# Patient Record
Sex: MALE | Race: WHITE | NOT HISPANIC OR LATINO | Employment: FULL TIME | URBAN - METROPOLITAN AREA
[De-identification: names, ages, dates, MRNs, and addresses within clinical notes are randomized per-mention and may not be internally consistent; named-entity substitution may affect disease eponyms.]

---

## 2017-01-10 ENCOUNTER — OFFICE VISIT (OUTPATIENT)
Dept: PSYCHOLOGY | Facility: CLINIC | Age: 47
End: 2017-01-10
Payer: MEDICAID

## 2017-01-10 DIAGNOSIS — F41.1 GENERALIZED ANXIETY DISORDER: Primary | ICD-10-CM

## 2017-01-10 PROCEDURE — 90834 PSYTX W PT 45 MINUTES: CPT | Performed by: MARRIAGE & FAMILY THERAPIST

## 2017-01-10 ASSESSMENT — ANXIETY QUESTIONNAIRES
6. BECOMING EASILY ANNOYED OR IRRITABLE: NOT AT ALL
5. BEING SO RESTLESS THAT IT IS HARD TO SIT STILL: NOT AT ALL
7. FEELING AFRAID AS IF SOMETHING AWFUL MIGHT HAPPEN: NOT AT ALL
GAD7 TOTAL SCORE: 3
1. FEELING NERVOUS, ANXIOUS, OR ON EDGE: SEVERAL DAYS
2. NOT BEING ABLE TO STOP OR CONTROL WORRYING: SEVERAL DAYS
IF YOU CHECKED OFF ANY PROBLEMS ON THIS QUESTIONNAIRE, HOW DIFFICULT HAVE THESE PROBLEMS MADE IT FOR YOU TO DO YOUR WORK, TAKE CARE OF THINGS AT HOME, OR GET ALONG WITH OTHER PEOPLE: SOMEWHAT DIFFICULT
3. WORRYING TOO MUCH ABOUT DIFFERENT THINGS: SEVERAL DAYS

## 2017-01-10 ASSESSMENT — PATIENT HEALTH QUESTIONNAIRE - PHQ9: 5. POOR APPETITE OR OVEREATING: NOT AT ALL

## 2017-01-10 NOTE — PROGRESS NOTES
Progress Note    Client Name: Antonio Ford  Date: 1/10/2017       Service Type: Individual      Session Start Time: 1pm Session End Time: 1:45pm   Session Length: 45 - 50     Session #: 68     Attendees: Client attended alone      PHQ-9 / FRANCISCO JAVIER-7 Due Date: completed today     DATA    Treatment Objective(s) Addressed in This Session:  Will identify and challenge negative self talk contributing to anxiety sx  ID current stressors contributing to anxiety      Progress on / Status of Treatment Objective(s) / Homework:  Satisfactory progress - ACTION (Actively working towards change); Intervened by reinforcing change plan / affirming steps taken continues to work on refuting negative thoughts,considering other employment.   Intervention:  Solution focused, CBT   Client reports he has been questioning whether he would like to remain at present job, as finances have not increased and he is unsure if he likes the work he presently does. Discussed other areas of work he might be qualified for or interested and encouraged him to start job seeking as well as looking into HR certifications he may need to pursue jobs in potential interest area. Client reports continued frustrations with girlfriend having monthly mood shifts, and disagreements around intimacy. Encouraged client to refute anxious thoughts when they occur and distract, allowing girlfriend some space when she is feeling irritable. Discussed girlfriend's growing up years with a father who was distant, and explosive at times, as well as hx of her dating relationships and how this effects their dating relationship- needing to build trust, working to resolve conflict.    90-day Treatment Plan review completed: yes    Current Stressors / Issues:  family, financial, dating/social, medical, occupational    Medication Review:  No changes to current psychiatric medication(s)   Current Outpatient Prescriptions   Medication      DULoxetine (CYMBALTA) 30 MG EC capsule     benzonatate (TESSALON) 200 MG capsule     amoxicillin-clavulanate (AUGMENTIN) 875-125 MG per tablet     albuterol (PROAIR HFA, PROVENTIL HFA, VENTOLIN HFA) 108 (90 BASE) MCG/ACT inhaler     fexofenadine (ALLEGRA) 180 MG tablet     guaiFENesin-codeine (ROBITUSSIN AC) 100-10 MG/5ML SOLN     sildenafil (VIAGRA) 100 MG tablet     gabapentin (NEURONTIN) 300 MG capsule     amitriptyline (ELAVIL) 50 MG tablet     No current facility-administered medications for this visit.         Medication Compliance:  Yes    Changes in Health Issues:   None reported    Chemical Use Review:   Substance Use: Chemical use reviewed, no active concerns identified      Tobacco Use: No current tobacco use.        ASSESSMENT: Current Emotional / Mental Status (status of significant symptoms):    Risk status (Self / Other harm or suicidal ideation)  Client denies current fears or concerns for personal safety.  Client denies current or recent suicidal ideation or behaviors.  Client denies current or recent homicidal ideation or behaviors.  Client denies current or recent self injurious behavior or ideation.  Client denies other safety concerns.  A safety and risk management plan has not been developed at this time, however client was given the after-hours number should there be a change in any of these risk factors.    Appearance:   Appropriate   Eye Contact:   Good   Psychomotor Behavior: Normal   Attitude:   Cooperative   Orientation:   All  Speech   Rate / Production: Normal    Volume:  Normal   Mood:    Anxious   Affect:    Appropriate   Thought Content:  Clear   Thought Form:  Coherent  Logical   Insight:    Fair     Collateral Reports Completed:  Not Applicable    PLAN: (Homework, other)  Encouraged client to continue to use thought refuting and confinement, and challenge negative thoughts.  Use cool down periods and allow girlfriend some space when in disagreements or irritable. Continue to  encourage client to utilize positive self affirmations and prayer when feeling anxious or doubting to improve the moment.  Client will continue to accept anxiety as a normal part of the process when considering changes and marriage.            Debbie Schreiber                                                     ________________________________________________________________________    Treatment Plan    Client's Name: Antonio Ford  YOB: 1970    Date: 8/20/2014    DSM-IV Diagnoses    AXIS I: 300.02 FRANCISCO JAVIER  Referral / Collaboration:  Collaboration was initiated with PCP MD.    Anticipated number of session or this episode of care: 45-50      MeasurableTreatment Goal(s) related to diagnosis / functional impairment(s)  Goal 1:Depression and anxiety: Client will decrease depressed mood and anxiety as evidenced by PHQ9 and GAD7 scores 4 and Under in the next 6 months. scoring 9/1/2015:  GAD7: 14. 1/10/2017: PHQ9:4, GAD7:3     I will know I've met my goal when I'm feeling less depressed and anxious, and happier in my relationship/my marriage is improving.    Objective #A (Client Action)  Status: Continued - Date(s):1/10/2017  Client will identify 1-2 initial signs or symptoms of anxiety and utilize anxiety reduction techniques to decrease anxiety. Client will ID triggers for depression and anxiety and work towards decreasing reactivity to or eliminating stressor if possible. client will develop more effective coping skills to manage depression and anxiety symptoms, will develop healthy cognitive patterns and beliefs, will increase ability to function adaptively and will continue to take medications as prescribed / participate in supportive activities. Client will improve communication and assertiveness skills and learn to set boundaries with others.    Intervention(s)   Therapist will teach client how to ID body cues for anxiety, anxiety reduction techniques, how to ID triggers for depression and anxiety-  decrease reactivity/eliminate, lifestyle changes to reduce depression and anxiety, communication skills, explore cognitive beliefs and help client to develop healthy cognitive patterns and beliefs. Grief counseling.       Client has reviewed and agreed to the above plan.      Debbie Schreiber

## 2017-01-11 ASSESSMENT — ANXIETY QUESTIONNAIRES: GAD7 TOTAL SCORE: 3

## 2017-01-11 ASSESSMENT — PATIENT HEALTH QUESTIONNAIRE - PHQ9: SUM OF ALL RESPONSES TO PHQ QUESTIONS 1-9: 4

## 2017-01-24 ENCOUNTER — OFFICE VISIT (OUTPATIENT)
Dept: PSYCHOLOGY | Facility: CLINIC | Age: 47
End: 2017-01-24
Payer: MEDICAID

## 2017-01-24 DIAGNOSIS — F41.1 GENERALIZED ANXIETY DISORDER: Primary | ICD-10-CM

## 2017-01-24 PROCEDURE — 90834 PSYTX W PT 45 MINUTES: CPT | Performed by: MARRIAGE & FAMILY THERAPIST

## 2017-01-24 NOTE — PROGRESS NOTES
Progress Note    Client Name: Antonio Ford  Date: 1/24/2017       Service Type: Individual      Session Start Time: 2pm Session End Time: 2:45pm   Session Length: 45 - 50     Session #: 69     Attendees: Client attended alone      PHQ-9 / FRANCISCO JAVIER-7 Due Date: completed last session   DATA    Treatment Objective(s) Addressed in This Session:  Will identify and challenge negative self talk contributing to anxiety sx  ID current stressors contributing to anxiety      Progress on / Status of Treatment Objective(s) / Homework:  Satisfactory progress - ACTION (Actively working towards change); Intervened by reinforcing change plan / affirming steps taken continues to work on reducing anxiety,considering other employment/spoke with boss.   Intervention:   CBT   Client reports this morning was rather emotional and tearful. He had a converstation with his friend whom he works for about his job, feeling as thought he is not making enough progress, complaints he has had about lack of training. Friend talked with him about training and that he has had enough he struggles to believe in himself and put the training into action. Encouraged client to notice black and white thinking and refute negative thoughts when they occur. Discussed praying against negative thoughts that have taken hold and encouraged use of positive self talk and self affirmation statements. Encouraged client by discuss progress client has made in counseling to date. Client reports relationship with girlfriend and relationships with children continue to go well.     90-day Treatment Plan review completed: yes    Current Stressors / Issues:  family, financial, dating/social, medical, occupational    Medication Review:  No changes to current psychiatric medication(s)   Current Outpatient Prescriptions   Medication     DULoxetine (CYMBALTA) 30 MG EC capsule     benzonatate (TESSALON) 200 MG capsule      amoxicillin-clavulanate (AUGMENTIN) 875-125 MG per tablet     albuterol (PROAIR HFA, PROVENTIL HFA, VENTOLIN HFA) 108 (90 BASE) MCG/ACT inhaler     fexofenadine (ALLEGRA) 180 MG tablet     guaiFENesin-codeine (ROBITUSSIN AC) 100-10 MG/5ML SOLN     sildenafil (VIAGRA) 100 MG tablet     gabapentin (NEURONTIN) 300 MG capsule     amitriptyline (ELAVIL) 50 MG tablet     No current facility-administered medications for this visit.         Medication Compliance:  Yes    Changes in Health Issues:   None reported    Chemical Use Review:   Substance Use: Chemical use reviewed, no active concerns identified      Tobacco Use: No current tobacco use.        ASSESSMENT: Current Emotional / Mental Status (status of significant symptoms):    Risk status (Self / Other harm or suicidal ideation)  Client denies current fears or concerns for personal safety.  Client denies current or recent suicidal ideation or behaviors.  Client denies current or recent homicidal ideation or behaviors.  Client denies current or recent self injurious behavior or ideation.  Client denies other safety concerns.  A safety and risk management plan has not been developed at this time, however client was given the after-hours number should there be a change in any of these risk factors.    Appearance:   Appropriate   Eye Contact:   Good   Psychomotor Behavior: Normal   Attitude:   Cooperative   Orientation:   All  Speech   Rate / Production: Normal    Volume:  Normal   Mood:    Anxious   Affect:    Appropriate   Thought Content:  Clear   Thought Form:  Coherent  Logical   Insight:    Fair     Collateral Reports Completed:  Not Applicable    PLAN: (Homework, other)  Encouraged client to continue to use thought refuting and confinement, and challenge negative thoughts.  Continue to encourage client to utilize positive self affirmations and prayer when feeling anxious or doubting to improve the moment.           Debbie Schreiber                                                      ________________________________________________________________________    Treatment Plan    Client's Name: Antonio Ford  YOB: 1970    Date: 8/20/2014    DSM-IV Diagnoses    AXIS I: 300.02 FRANCISCO JAVIER  Referral / Collaboration:  Collaboration was initiated with PCP MD.    Anticipated number of session or this episode of care: 45-50      MeasurableTreatment Goal(s) related to diagnosis / functional impairment(s)  Goal 1:Depression and anxiety: Client will decrease depressed mood and anxiety as evidenced by PHQ9 and GAD7 scores 4 and Under in the next 6 months. scoring 9/1/2015:  GAD7: 14. 1/10/2017: PHQ9:4, GAD7:3     I will know I've met my goal when I'm feeling less depressed and anxious, and happier in my relationship/my marriage is improving.    Objective #A (Client Action)  Status: Continued - Date(s):1/24/2017  Client will identify 1-2 initial signs or symptoms of anxiety and utilize anxiety reduction techniques to decrease anxiety. Client will ID triggers for depression and anxiety and work towards decreasing reactivity to or eliminating stressor if possible. client will develop more effective coping skills to manage depression and anxiety symptoms, will develop healthy cognitive patterns and beliefs, will increase ability to function adaptively and will continue to take medications as prescribed / participate in supportive activities. Client will improve communication and assertiveness skills and learn to set boundaries with others.    Intervention(s)   Therapist will teach client how to ID body cues for anxiety, anxiety reduction techniques, how to ID triggers for depression and anxiety- decrease reactivity/eliminate, lifestyle changes to reduce depression and anxiety, communication skills, explore cognitive beliefs and help client to develop healthy cognitive patterns and beliefs. Grief counseling.       Client has reviewed and agreed to the above plan.      Debbie WILCOX  Schreiber

## 2017-02-07 ENCOUNTER — OFFICE VISIT (OUTPATIENT)
Dept: PSYCHOLOGY | Facility: CLINIC | Age: 47
End: 2017-02-07
Payer: COMMERCIAL

## 2017-02-07 DIAGNOSIS — F41.1 GENERALIZED ANXIETY DISORDER: Primary | ICD-10-CM

## 2017-02-07 DIAGNOSIS — F40.10 SOCIAL ANXIETY DISORDER: ICD-10-CM

## 2017-02-07 PROCEDURE — 90834 PSYTX W PT 45 MINUTES: CPT | Performed by: MARRIAGE & FAMILY THERAPIST

## 2017-02-07 NOTE — PROGRESS NOTES
Progress Note    Client Name: Antonio Ford  Date: 2/7/2017         Service Type: Individual      Session Start Time: 3pm Session End Time: 3:45pm   Session Length: 45 - 50     Session #: 70     Attendees: Client attended alone      PHQ-9 / FRANCISCO JAVIER-7 Due Date: completed 1/4/17, declined  DATA    Treatment Objective(s) Addressed in This Session:  Will identify and challenge negative self talk contributing to anxiety sx  ID current stressors contributing to anxiety      Progress on / Status of Treatment Objective(s) / Homework:  Satisfactory progress - ACTION (Actively working towards change); Intervened by reinforcing change plan / affirming steps taken continues to work on reducing anxiety,considering other employment/spoke with boss.   Intervention:   CBT   Client reports last week he had a disagreement with girlfriend during an emotional week for her and she broke up with client. Client reports since this time feeling very panicky and anxious as he thought she was the one he would . Client reports they have not had contact since this time, however he would like to have contact with her, and is hoping she will change her mind. Discussed anxiety related to being alone and not partnered. It is his desire to remarry rather than be alone. Discussed that client needs work through anxiety about being alone, and learn to love and accept himself for who he is, and build up his self esteem. Encouraged client to focus his time and energy on building himself up, structuring day with hobbies and interests, continuing to read [employer] and remember the positive aspects of his present life. Client reports ex wife is very upset with him about the amount of child support he is paying and sent him an email about her thoughts. 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 Encouraged him to delay  contacting ex girlfriend and ex wife until after the interview has past, and instead focus time and energy om preparing for the interview, self care, and children. Discussed possible use of a worry time/thought confinement, thought refuting, and distraction if needed.       90-day Treatment Plan review completed: yes    Current Stressors / Issues:  family, financial, dating/social, medical, occupational    Medication Review:  No changes to current psychiatric medication(s)   Current Outpatient Prescriptions   Medication     DULoxetine (CYMBALTA) 30 MG EC capsule     benzonatate (TESSALON) 200 MG capsule     amoxicillin-clavulanate (AUGMENTIN) 875-125 MG per tablet     albuterol (PROAIR HFA, PROVENTIL HFA, VENTOLIN HFA) 108 (90 BASE) MCG/ACT inhaler     fexofenadine (ALLEGRA) 180 MG tablet     guaiFENesin-codeine (ROBITUSSIN AC) 100-10 MG/5ML SOLN     sildenafil (VIAGRA) 100 MG tablet     gabapentin (NEURONTIN) 300 MG capsule     amitriptyline (ELAVIL) 50 MG tablet     No current facility-administered medications for this visit.         Medication Compliance:  Yes    Changes in Health Issues:   None reported    Chemical Use Review:   Substance Use: Chemical use reviewed, no active concerns identified      Tobacco Use: No current tobacco use.        ASSESSMENT: Current Emotional / Mental Status (status of significant symptoms):    Risk status (Self / Other harm or suicidal ideation)  Client denies current fears or concerns for personal safety.  Client denies current or recent suicidal ideation or behaviors.  Client denies current or recent homicidal ideation or behaviors.  Client denies current or recent self injurious behavior or ideation.  Client denies other safety concerns.  A safety and risk management plan has not been developed at this time, however client was given the after-hours number should there be a change in any of these risk factors.    Appearance:   Appropriate   Eye Contact:   Good   Psychomotor  Behavior: Normal   Attitude:   Cooperative   Orientation:   All  Speech   Rate / Production: Normal    Volume:  Normal   Mood:    Anxious  Sad   Affect:    Appropriate   Thought Content:  Clear   Thought Form:  Coherent  Logical   Insight:    Fair     Collateral Reports Completed:  Not Applicable    PLAN: (Homework, other)  Encouraged client to continue to use thought refuting and confinement/worry time, distraction, and challenge negative thoughts.  Continue to encourage client to utilize positive self affirmations and prayer when feeling anxious or doubting to improve the moment. Client will focus on self care, and preparing for interview.           Debbie Schreiber                                                     ________________________________________________________________________    Treatment Plan    Client's Name: Antonio Ford  YOB: 1970    Date: 8/20/2014    DSM-IV Diagnoses    AXIS I: 300.02 FRANCISCO JAVIER, 300.23 Social Anxiety Disorder  Referral / Collaboration:  Collaboration was initiated with PCP MD.    Anticipated number of session or this episode of care: 75-80      MeasurableTreatment Goal(s) related to diagnosis / functional impairment(s)  Goal 1:Depression and anxiety: Client will decrease depressed mood and anxiety as evidenced by PHQ9 and GAD7 scores 4 and Under in the next 6 months. scoring 9/1/2015:  GAD7: 14. 1/10/2017: PHQ9:4, GAD7:3     I will know I've met my goal when I'm feeling less depressed and anxious, and happier in my relationship/my marriage is improving.    Objective #A (Client Action)  Status: Continued - Date(s):2/7/2017  Client will identify 1-2 initial signs or symptoms of anxiety and utilize anxiety reduction techniques to decrease anxiety. Client will ID triggers for depression and anxiety and work towards decreasing reactivity to or eliminating stressor if possible. client will develop more effective coping skills to manage depression and anxiety symptoms, will  develop healthy cognitive patterns and beliefs, will increase ability to function adaptively and will continue to take medications as prescribed / participate in supportive activities. Client will improve communication and assertiveness skills and learn to set boundaries with others.    Intervention(s)   Therapist will teach client how to ID body cues for anxiety, anxiety reduction techniques, how to ID triggers for depression and anxiety- decrease reactivity/eliminate, lifestyle changes to reduce depression and anxiety, communication skills, explore cognitive beliefs and help client to develop healthy cognitive patterns and beliefs. Grief counseling.       Client has reviewed and agreed to the above plan.      Debbie Schreiber

## 2017-02-14 ENCOUNTER — OFFICE VISIT (OUTPATIENT)
Dept: PSYCHOLOGY | Facility: CLINIC | Age: 47
End: 2017-02-14
Payer: COMMERCIAL

## 2017-02-14 DIAGNOSIS — F41.1 GENERALIZED ANXIETY DISORDER: Primary | ICD-10-CM

## 2017-02-14 DIAGNOSIS — F40.10 SOCIAL ANXIETY DISORDER: ICD-10-CM

## 2017-02-14 PROCEDURE — 90834 PSYTX W PT 45 MINUTES: CPT | Performed by: MARRIAGE & FAMILY THERAPIST

## 2017-02-14 NOTE — MR AVS SNAPSHOT
MRN:[id number]                      After Visit Summary   2/14/2017    Antonio Ford    MRN: [id number]           Visit Information        Provider Department      2/14/2017 3:00 PM Debbie Bro [facility]      Your next 10 appointments already scheduled     Feb 21, 2017  8:00 AM CST   Return Visit with Debbie Hubbardlure   [facility] ([facility]    [address]  [facility] [address]   [phone]            Mar 01, 2017  9:00 AM CST   Return Visit with Debbie Bro   [facility] ([facility])    [address]  [facility] [address]   [phone]              MyChart Information     [facility] gives you secure access to your electronic health record. If you see a primary care provider, you can also send messages to your care team and make appointments. If you have questions, please call your primary care clinic.  If you do not have a primary care provider, please call [phone] and they will assist you.        Care EveryWhere ID     This is your Care EveryWhere ID. This could be used by other organizations to access your [place] [facility] medical records  [id number]

## 2017-02-14 NOTE — PROGRESS NOTES
Progress Note    Client Name: Antonio Ford  Date: 2/14/2017       Service Type: Individual      Session Start Time: 3pm Session End Time: 3:45pm   Session Length: 45 - 50     Session #: 71     Attendees: Client attended alone      PHQ-9 / FRANCISCO JAVIER-7 Due Date:  declined  DATA    Treatment Objective(s) Addressed in This Session:  Will identify and challenge negative self talk contributing to anxiety sx  ID current stressors contributing to anxiety      Progress on / Status of Treatment Objective(s) / Homework:  Satisfactory progress - ACTION (Actively working towards change); Intervened by reinforcing change plan / affirming steps taken continues to work on reducing anxiety,considering other employment/spoke with boss.   Intervention:   CBT   Client reports remains increasingly anxious, as he and ex girlfriend remain broken up.Reports he was able to refrain from contacting ex girlfriend for about 2 days after last met, and then did initiated contact with her. Reports they have had brief texts which have gone ok. Reports Sunday morning he had a dream in which he felt compelled to email ex girlfriend an emailed he compiled shortly after she broke off their relationship. Client reports ex girlfriend acknowledged she received the email but has asked for several days to compile her thoughts before she responds, and states she is concerned he is anxiously awaiting a response. Client feels that ex girlfriend is the woman for him. Discussed provider concerns that ex girlfriend has some similar traits: anxiety, control, wanting client to make significant changes in his life, but not working on her own issues that are share features with ex wife. Discussed whether relationship moving forward would be healthy for client and what changes might need to be made by ex girlfriend in the future for relationship to be more healthy and functional. Continue to encourage use of a worry time/thought  confinement, thought refuting, building up hobbies/interests, increased socialization, use of exercise and distractions.  Client had a job interview last week with [employer] which he states was very positive, and has a second interview this week. He is very excited about this potential position, and if offered will greatly improve his financial picture and reduce tension with ex wife.         90-day Treatment Plan review completed: yes    Current Stressors / Issues:  family, financial, dating/social, medical, occupational    Medication Review:  No changes to current psychiatric medication(s)   Current Outpatient Prescriptions   Medication     DULoxetine (CYMBALTA) 30 MG EC capsule     benzonatate (TESSALON) 200 MG capsule     amoxicillin-clavulanate (AUGMENTIN) 875-125 MG per tablet     albuterol (PROAIR HFA, PROVENTIL HFA, VENTOLIN HFA) 108 (90 BASE) MCG/ACT inhaler     fexofenadine (ALLEGRA) 180 MG tablet     guaiFENesin-codeine (ROBITUSSIN AC) 100-10 MG/5ML SOLN     sildenafil (VIAGRA) 100 MG tablet     gabapentin (NEURONTIN) 300 MG capsule     amitriptyline (ELAVIL) 50 MG tablet     No current facility-administered medications for this visit.          Medication Compliance:  Yes    Changes in Health Issues:   None reported    Chemical Use Review:   Substance Use: Chemical use reviewed, no active concerns identified      Tobacco Use: No current tobacco use.        ASSESSMENT: Current Emotional / Mental Status (status of significant symptoms):    Risk status (Self / Other harm or suicidal ideation)  Client denies current fears or concerns for personal safety.  Client denies current or recent suicidal ideation or behaviors.  Client denies current or recent homicidal ideation or behaviors.  Client denies current or recent self injurious behavior or ideation.  Client denies other safety concerns.  A safety and risk management plan has not been developed at this time, however client was given the after-hours number  should there be a change in any of these risk factors.    Appearance:   Appropriate   Eye Contact:   Good   Psychomotor Behavior: Normal   Attitude:   Cooperative   Orientation:   All  Speech   Rate / Production: Normal    Volume:  Normal   Mood:    Anxious  Sad   Affect:    Appropriate   Thought Content:  Clear   Thought Form:  Coherent  Logical   Insight:    Fair     Collateral Reports Completed:  Not Applicable    PLAN: (Homework, other)  Continue to encourage use of a worry time/thought confinement, thought refuting, building up hobbies/interests, increased socialization, use of exercise and distractions.  Continue to encourage client to utilize positive self affirmations and prayer when feeling anxious or doubting to improve the moment. Client will focus on self care, and preparing for interview. Client will consider whether pursuing relationship with ex girlfriend is healthy for him.           Debbie Schreiber                                                     ________________________________________________________________________    Treatment Plan    Client's Name: Antonio Ford  YOB: 1970    Date: 8/20/2014    DSM-IV Diagnoses    AXIS I: 300.02 FRANCISCO JAVIER, 300.23 Social Anxiety Disorder  Referral / Collaboration:  Collaboration was initiated with PCP MD.    Anticipated number of session or this episode of care: 75-80      MeasurableTreatment Goal(s) related to diagnosis / functional impairment(s)  Goal 1:Depression and anxiety: Client will decrease depressed mood and anxiety as evidenced by PHQ9 and GAD7 scores 4 and Under in the next 6 months. scoring 9/1/2015:  GAD7: 14. 1/10/2017: PHQ9:4, GAD7:3     I will know I've met my goal when I'm feeling less depressed and anxious, and happier in my relationship/my marriage is improving.    Objective #A (Client Action)  Status: Continued - Date(s):2/14/2017  Client will identify 1-2 initial signs or symptoms of anxiety and utilize anxiety reduction  techniques to decrease anxiety. Client will ID triggers for depression and anxiety and work towards decreasing reactivity to or eliminating stressor if possible. client will develop more effective coping skills to manage depression and anxiety symptoms, will develop healthy cognitive patterns and beliefs, will increase ability to function adaptively and will continue to take medications as prescribed / participate in supportive activities. Client will improve communication and assertiveness skills and learn to set boundaries with others.    Intervention(s)   Therapist will teach client how to ID body cues for anxiety, anxiety reduction techniques, how to ID triggers for depression and anxiety- decrease reactivity/eliminate, lifestyle changes to reduce depression and anxiety, communication skills, explore cognitive beliefs and help client to develop healthy cognitive patterns and beliefs. Grief counseling.       Client has reviewed and agreed to the above plan.      Debbie Schreiber

## 2017-02-21 ENCOUNTER — OFFICE VISIT (OUTPATIENT)
Dept: PSYCHOLOGY | Facility: CLINIC | Age: 47
End: 2017-02-21
Payer: COMMERCIAL

## 2017-02-21 DIAGNOSIS — F40.10 SOCIAL ANXIETY DISORDER: ICD-10-CM

## 2017-02-21 DIAGNOSIS — F41.1 GENERALIZED ANXIETY DISORDER: Primary | ICD-10-CM

## 2017-02-21 PROCEDURE — 90834 PSYTX W PT 45 MINUTES: CPT | Performed by: MARRIAGE & FAMILY THERAPIST

## 2017-02-21 NOTE — PROGRESS NOTES
Progress Note    Client Name: Antonio Ford  Date: 2/21/2017       Service Type: Individual      Session Start Time: 8am Session End Time: 8:45am   Session Length: 45 - 50     Session #: 72     Attendees: Client attended alone      PHQ-9 / FRANCISCO JAVIER-7 Due Date:  declined  DATA    Treatment Objective(s) Addressed in This Session:  Will identify and challenge negative self talk contributing to anxiety sx  ID current stressors contributing to anxiety      Progress on / Status of Treatment Objective(s) / Homework:  Satisfactory progress - ACTION (Actively working towards change); Intervened by reinforcing change plan / affirming steps taken continues to work on reducing anxiety,considering other employment/spoke with boss.   Intervention:   CBT   Client reports he and girlfriend were able to reconcile their relationship and are back together. Reports they have been talking about the importance of putting intimacy on hold until marriage and discussed some changes client may make to improve ability to do so. Encouraged client and girlfriend to consider reading the resolution for men and women books together, and have also discussed working to talk about conflict when it happens rather than girlfriend stuffing it and getting upset. Discussed some ongoing issues with ex wife wanting him to pay child support and threatening to take him to court despite current calculation stating she owes him money. Encouraged him to stand his ground about not giving money at the detriment of his own household. Client reports good friend has offered to pay client's legal fees should they end up in court. Reports son is doing poorly in school, so they have grounded him to the house to complete homework, and can only go to school and work. Client reports son is working hard to get caught up, but likely will not graduate this year. Continue to encourage client to use natural and logical consequences for  son, and allow him to take ownership of his problems. Reports job interview went well, and should hear back from [employer] next week. Encouraged client to brainstorm some questions that might be asked at next interview and practice answering the questions to reduce overall anxiety.     90-day Treatment Plan review completed: yes    Current Stressors / Issues:  family, financial, dating/social, medical, occupational    Medication Review:  No changes to current psychiatric medication(s)   Current Outpatient Prescriptions   Medication     DULoxetine (CYMBALTA) 30 MG EC capsule     benzonatate (TESSALON) 200 MG capsule     amoxicillin-clavulanate (AUGMENTIN) 875-125 MG per tablet     albuterol (PROAIR HFA, PROVENTIL HFA, VENTOLIN HFA) 108 (90 BASE) MCG/ACT inhaler     fexofenadine (ALLEGRA) 180 MG tablet     guaiFENesin-codeine (ROBITUSSIN AC) 100-10 MG/5ML SOLN     sildenafil (VIAGRA) 100 MG tablet     gabapentin (NEURONTIN) 300 MG capsule     amitriptyline (ELAVIL) 50 MG tablet     No current facility-administered medications for this visit.          Medication Compliance:  Yes    Changes in Health Issues:   None reported    Chemical Use Review:   Substance Use: Chemical use reviewed, no active concerns identified      Tobacco Use: No current tobacco use.        ASSESSMENT: Current Emotional / Mental Status (status of significant symptoms):    Risk status (Self / Other harm or suicidal ideation)  Client denies current fears or concerns for personal safety.  Client denies current or recent suicidal ideation or behaviors.  Client denies current or recent homicidal ideation or behaviors.  Client denies current or recent self injurious behavior or ideation.  Client denies other safety concerns.  A safety and risk management plan has not been developed at this time, however client was given the after-hours number should there be a change in any of these risk factors.    Appearance:   Appropriate   Eye Contact:   Good    Psychomotor Behavior: Normal   Attitude:   Cooperative   Orientation:   All  Speech   Rate / Production: Normal    Volume:  Normal   Mood:    Anxious   Affect:    Appropriate   Thought Content:  Clear   Thought Form:  Coherent  Logical   Insight:    Fair     Collateral Reports Completed:  Not Applicable    PLAN: (Homework, other)  Continue to encourage use of a worry time/thought confinement, thought refuting, building up hobbies/interests, increased socialization, use of exercise and distractions.  Continue to encourage client to utilize positive self affirmations and prayer when feeling anxious or doubting to improve the moment. Client will focus on self care, and preparing for next interview.         Debbie Schreiber                                                     ________________________________________________________________________    Treatment Plan    Client's Name: Antonio Ford  YOB: 1970    Date: 8/20/2014    DSM-IV Diagnoses    AXIS I: 300.02 FRANCISCO JAVIER, 300.23 Social Anxiety Disorder  Referral / Collaboration:  Collaboration was initiated with PCP MD.    Anticipated number of session or this episode of care: 75-80      MeasurableTreatment Goal(s) related to diagnosis / functional impairment(s)  Goal 1:Depression and anxiety: Client will decrease depressed mood and anxiety as evidenced by PHQ9 and GAD7 scores 4 and Under in the next 6 months. scoring 9/1/2015:  GAD7: 14. 1/10/2017: PHQ9:4, GAD7:3     I will know I've met my goal when I'm feeling less depressed and anxious, and happier in my relationship/my marriage is improving.    Objective #A (Client Action)  Status: Continued - Date(s):2/21/2017  Client will identify 1-2 initial signs or symptoms of anxiety and utilize anxiety reduction techniques to decrease anxiety. Client will ID triggers for depression and anxiety and work towards decreasing reactivity to or eliminating stressor if possible. client will develop more effective  coping skills to manage depression and anxiety symptoms, will develop healthy cognitive patterns and beliefs, will increase ability to function adaptively and will continue to take medications as prescribed / participate in supportive activities. Client will improve communication and assertiveness skills and learn to set boundaries with others.    Intervention(s)   Therapist will teach client how to ID body cues for anxiety, anxiety reduction techniques, how to ID triggers for depression and anxiety- decrease reactivity/eliminate, lifestyle changes to reduce depression and anxiety, communication skills, explore cognitive beliefs and help client to develop healthy cognitive patterns and beliefs. Grief counseling.       Client has reviewed and agreed to the above plan.      Debbie Schreiber

## 2017-02-21 NOTE — MR AVS SNAPSHOT
MRN:[id number]                      After Visit Summary   2/21/2017    Antonio Ford    MRN: [id number]           Visit Information        Provider Department      2/21/2017 8:00 AM Debbie Bro [facility]      Your next 10 appointments already scheduled     Mar 01, 2017  9:00 AM CST   Return Visit with Debbie Hubbardlure   [facility] ([facility])    [address]  [facility] [address] [phone]            Mar 14, 2017  3:00 PM CDT   Return Visit with Debbie Bro   [facility] ([facility])    [address]  [facility] [address]   [phone]              MyChart Information     [facility] gives you secure access to your electronic health record. If you see a primary care provider, you can also send messages to your care team and make appointments. If you have questions, please call your primary care clinic.  If you do not have a primary care provider, please call [phone] and they will assist you.        Care EveryWhere ID     This is your Care EveryWhere ID. This could be used by other organizations to access your [place] medical records  [id number]

## 2017-03-01 ENCOUNTER — OFFICE VISIT (OUTPATIENT)
Dept: PSYCHOLOGY | Facility: CLINIC | Age: 47
End: 2017-03-01
Payer: COMMERCIAL

## 2017-03-01 DIAGNOSIS — F41.1 GENERALIZED ANXIETY DISORDER: Primary | ICD-10-CM

## 2017-03-01 DIAGNOSIS — F40.10 SOCIAL ANXIETY DISORDER: ICD-10-CM

## 2017-03-01 PROCEDURE — 90834 PSYTX W PT 45 MINUTES: CPT | Performed by: MARRIAGE & FAMILY THERAPIST

## 2017-03-01 NOTE — PROGRESS NOTES
Progress Note    Client Name: Antonio Ford  Date: 3/1/2017       Service Type: Individual      Session Start Time: 9am Session End Time: 9:45am   Session Length: 45 - 50     Session #: 73     Attendees: Client attended alone      PHQ-9 / FRANCISCO JAVIER-7 Due Date:  declined  DATA    Treatment Objective(s) Addressed in This Session:  ID current stressors contributing to anxiety  Client will share thoughts, feelings, and wants with girlfriend      Progress on / Status of Treatment Objective(s) / Homework:  Satisfactory progress - ACTION (Actively working towards change); Intervened by reinforcing change plan / affirming steps taken continues to work on reducing anxiety, working on improving relationship with girlfriend    Intervention:   CBT, solution focused   Client reports this last week has been stressful as girlfriend fell while playing with her son and fractured her ankle. As it was her right foot she is unable to drive, and has required a lot of care and help around her home. Reports that when he spends time with girlfriend he likes to looks at her. Girlfriend typically does not like this. Gave client an awareness wheel handout and encouraged client to talk with girlfriend about each of their thoughts, feelings, and wants in this situation. Discussed with client that girlfriend is most likely feeling insecure with herself in these moments. Encouraged client to talk to girlfriend about ways he could make her feel more comfortable and supported in their relationship. Reports relationships with children continue to go well, and has not talked with ex about child support. Client is waiting to hear back from [employer] about the job opportunity.     90-day Treatment Plan review completed: yes    Current Stressors / Issues:  family, financial, dating/social, medical, occupational    Medication Review:  No changes to current psychiatric medication(s)   Current Outpatient Prescriptions    Medication     DULoxetine (CYMBALTA) 30 MG EC capsule     benzonatate (TESSALON) 200 MG capsule     amoxicillin-clavulanate (AUGMENTIN) 875-125 MG per tablet     albuterol (PROAIR HFA, PROVENTIL HFA, VENTOLIN HFA) 108 (90 BASE) MCG/ACT inhaler     fexofenadine (ALLEGRA) 180 MG tablet     guaiFENesin-codeine (ROBITUSSIN AC) 100-10 MG/5ML SOLN     sildenafil (VIAGRA) 100 MG tablet     gabapentin (NEURONTIN) 300 MG capsule     amitriptyline (ELAVIL) 50 MG tablet     No current facility-administered medications for this visit.          Medication Compliance:  Yes    Changes in Health Issues:   None reported    Chemical Use Review:   Substance Use: Chemical use reviewed, no active concerns identified      Tobacco Use: No current tobacco use.        ASSESSMENT: Current Emotional / Mental Status (status of significant symptoms):    Risk status (Self / Other harm or suicidal ideation)  Client denies current fears or concerns for personal safety.  Client denies current or recent suicidal ideation or behaviors.  Client denies current or recent homicidal ideation or behaviors.  Client denies current or recent self injurious behavior or ideation.  Client denies other safety concerns.  A safety and risk management plan has not been developed at this time, however client was given the after-hours number should there be a change in any of these risk factors.    Appearance:   Appropriate   Eye Contact:   Good   Psychomotor Behavior: Normal   Attitude:   Cooperative   Orientation:   All  Speech   Rate / Production: Normal    Volume:  Normal   Mood:    Anxious   Affect:    Appropriate   Thought Content:  Clear   Thought Form:  Coherent  Logical   Insight:    Fair     Collateral Reports Completed:  Not Applicable    PLAN: (Homework, other)  Continue to encourage use of a worry time/thought confinement, thought refuting, building up hobbies/interests, increased socialization, use of exercise and distractions.  Continue to encourage  client to utilize positive self affirmations and prayer when feeling anxious or doubting to improve the moment. Client will talk with girlfriend about thoughts, feelings, and wants      Debbie Schreiber                                                     ________________________________________________________________________    Treatment Plan    Client's Name: Antonio Ford  YOB: 1970    Date: 8/20/2014    DSM-IV Diagnoses    AXIS I: 300.02 FRANCISCO JAVIER, 300.23 Social Anxiety Disorder  Referral / Collaboration:  Collaboration was initiated with PCP MD.    Anticipated number of session or this episode of care: 75-80      MeasurableTreatment Goal(s) related to diagnosis / functional impairment(s)  Goal 1:Depression and anxiety: Client will decrease depressed mood and anxiety as evidenced by PHQ9 and GAD7 scores 4 and Under in the next 6 months. scoring 9/1/2015:  GAD7: 14. 1/10/2017: PHQ9:4, GAD7:3     I will know I've met my goal when I'm feeling less depressed and anxious, and happier in my relationship/my marriage is improving.    Objective #A (Client Action)  Status: Continued - Date(s):3/1/2017  Client will identify 1-2 initial signs or symptoms of anxiety and utilize anxiety reduction techniques to decrease anxiety. Client will ID triggers for depression and anxiety and work towards decreasing reactivity to or eliminating stressor if possible. client will develop more effective coping skills to manage depression and anxiety symptoms, will develop healthy cognitive patterns and beliefs, will increase ability to function adaptively and will continue to take medications as prescribed / participate in supportive activities. Client will improve communication and assertiveness skills and learn to set boundaries with others.    Intervention(s)   Therapist will teach client how to ID body cues for anxiety, anxiety reduction techniques, how to ID triggers for depression and anxiety- decrease  reactivity/eliminate, lifestyle changes to reduce depression and anxiety, communication skills, explore cognitive beliefs and help client to develop healthy cognitive patterns and beliefs. Grief counseling.       Client has reviewed and agreed to the above plan.      Debbie Schreiber

## 2017-03-01 NOTE — MR AVS SNAPSHOT
MRN:[id number]                      After Visit Summary   3/1/2017    Antonio Ford    MRN: [id number]           Visit Information        Provider Department      3/1/2017 9:00 AM Debbie Bro [facility] Generic      Your next 10 appointments already scheduled     Mar 14, 2017  3:00 PM CDT   Return Visit with Debbie Bro   [facility] ([facility])    [address]  [facility] [address] [phone]            Mar 28, 2017  8:00 AM CDT   Return Visit with Debbie Bro   [facility] ([facility])    [address]  [facility] [address]   [phone]              MyChart Information     [facility] gives you secure access to your electronic health record. If you see a primary care provider, you can also send messages to your care team and make appointments. If you have questions, please call your primary care clinic.  If you do not have a primary care provider, please call [phone] and they will assist you.        Care EveryWhere ID     This is your Care EveryWhere ID. This could be used by other organizations to access your [place] medical records  [id number]

## 2017-03-07 ENCOUNTER — OFFICE VISIT (OUTPATIENT)
Dept: URGENT CARE | Facility: RETAIL CLINIC | Age: 47
End: 2017-03-07
Payer: COMMERCIAL

## 2017-03-07 VITALS — TEMPERATURE: 99.7 F | SYSTOLIC BLOOD PRESSURE: 138 MMHG | HEART RATE: 96 BPM | DIASTOLIC BLOOD PRESSURE: 85 MMHG

## 2017-03-07 DIAGNOSIS — J30.2 SEASONAL ALLERGIC RHINITIS, UNSPECIFIED ALLERGIC RHINITIS TRIGGER: ICD-10-CM

## 2017-03-07 DIAGNOSIS — J01.90 ACUTE SINUSITIS WITH COEXISTING CONDITION, NEED PROPHYLACTIC TREATMENT: Primary | ICD-10-CM

## 2017-03-07 PROCEDURE — 99213 OFFICE O/P EST LOW 20 MIN: CPT | Performed by: PHYSICIAN ASSISTANT

## 2017-03-07 NOTE — MR AVS SNAPSHOT
After Visit Summary   3/7/2017    Antonio Ford    MRN: [id number]           Patient Information     Date Of Birth          1970        Visit Information        Provider Department      3/7/2017 1:20 PM Sharlene Madden PA-C [facility]        Today's Diagnoses     Acute sinusitis with coexisting condition, need prophylactic treatment    -  1    Seasonal allergic rhinitis, unspecified allergic rhinitis trigger          Care Instructions    Most sinus infections are viral and will go away on their own with out an antibiotic.  Fill you prescription if symptoms worsen after 7 days or do not improve after 10 days.  Prescription will be available to fill if needed on March 9.  Augmentin (amoxicillin-clavulanate) 875mg twice daily for 10 days as directed.  Take Advil cold and sinus behind the pharmacist counter.  Take Tylenol as needed for pain.  Afrin (oxymetazoline) nasal spray twice daily for 3 days. Stop after 3 days.  Flonase 2 sprays in each nostril daily until symptoms resolve, then continue 1 spray in each nostril for at least 5 more days.  May use netti pot with bottled or distilled water and saline packets to flush sinuses.  Mucinex (guiafenesin) thins mucus and may help it to loosen more quickly  Saline drops or nasal sprays may loosen mucus.  Sit in the bathroom with the door closed and hot shower running to loosen mucus.  Contact primary care clinic if you do not have any relief from your symptoms after 10 days.  Present to emergency room for significantly increasing pain, persistent high fever >102F, swelling/redness around your eyes, changes in your vision or ability to move your eyes, altered mental status or a severe headache.        Follow-ups after your visit        Your next 10 appointments already scheduled     Mar 14, 2017  3:00 PM CDT   Return Visit with Debbie Bro   [facility] ([facility])    [address]  [place] [facility] [id number] [phone]            Mar 28, 2017  8:00 AM CDT   Return Visit with Debbie Bro   [facility] ([facility])    [address]  [place] [id number] [phone]              Who to contact     You can reach your care team any time of the day by calling [phone].  Notification of test results:  If you have an abnormal lab result, we will notify you by phone as soon as possible.         Additional Information About Your Visit        MyChart Information     [employer] gives you secure access to your electronic health record. If you see a primary care provider, you can also send messages to your care team and make appointments. If you have questions, please call your primary care clinic.  If you do not have a primary care provider, please call [phone] and they will assist you.        Care EveryWhere ID     This is your Care EveryWhere ID. This could be used by other organizations to access your [facility] medical records  [id number]        Your Vitals Were     Pulse Temperature                96 99.7  F (37.6  C) (Temporal)           Blood Pressure from Last 3 Encounters:   03/07/17 138/85   05/11/16 122/76   04/20/16 124/86    Weight from Last 3 Encounters:   05/11/16 217 lb (98.4 kg)   07/07/15 205 lb (93 kg)   12/15/14 198 lb (89.8 kg)              Today, you had the following     No orders found for display         Where to get your medicines      These medications were sent to [facility] [address] [address] [address]  [address] [facility] [address]     Phone:  [phone]     amoxicillin-clavulanate 875-125 MG per tablet          Primary Care Provider Office Phone # Fax #    Josh Morales PA-C [phone]       [facility] [address]  [facility] [address]        Thank you!     Thank you for choosing [facility]  for your care. Our  goal is always to provide you with excellent care. Hearing back from our patients is one way we can continue to improve our services. Please take a few minutes to complete the written survey that you may receive in the mail after your visit with us. Thank you!             Your Updated Medication List - Protect others around you: Learn how to safely use, store and throw away your medicines at www.disposemymeds.org.          This list is accurate as of: 3/7/17  2:08 PM.  Always use your most recent med list.                   Brand Name Dispense Instructions for use    albuterol 108 (90 BASE) MCG/ACT Inhaler    PROAIR HFA/PROVENTIL HFA/VENTOLIN HFA    1 Inhaler    Inhale 2 puffs into the lungs every 6 hours as needed for shortness of breath / dyspnea or wheezing       amitriptyline 50 MG tablet    ELAVIL    30 tablet    Take 1 tablet (50 mg) by mouth At Bedtime       amoxicillin-clavulanate 875-125 MG per tablet   Start taking on:  3/10/2017    AUGMENTIN    20 tablet    Take 1 tablet by mouth 2 times daily for 10 days       DULoxetine 30 MG EC capsule    CYMBALTA    60 capsule    TAKE 1 CAPSULE (30 MG) BY MOUTH 2 TIMES DAILY       fexofenadine 180 MG tablet    ALLEGRA    90 tablet    Take 1 tablet (180 mg) by mouth daily       gabapentin 300 MG capsule    NEURONTIN    270 capsule    Take 3 capsules (900 mg) by mouth 3 times daily       sildenafil 100 MG cap/tab    REVATIO/VIAGRA    12 tablet    Take 0.5-1 tablets ( mg) by mouth daily as needed for erectile dysfunction Take 30 min to 4 hours before intercourse.  Never use with nitroglycerin, terazosin or doxazosin.

## 2017-03-07 NOTE — PROGRESS NOTES
Chief Complaint   Patient presents with     Sinus Problem     x 4 days, sinus congestion and pain, no fevers but pt has the chills     SUBJECTIVE:  Antonio Ford is a 46 year old male here with concerns about sinus infection.  He states onset of symptoms was 5 days ago.    Course of illness is worsening.   Severity moderate  He has had maxillary pressure as well as sneezing, nasal congestion, rhinorrhea, sore throat, facial pain/pressure, tooth pain and headache  Predisposing factors include seasonal allergies.   Recent treatment has included: Antihistamine, Decongestants and OTC meds    Past Medical History   Diagnosis Date     Allergic rhinitis, cause unspecified      Rhinitis, Allergic     Depressive disorder      Unspecified sinusitis (chronic)      Chronic sinusitis     Current Outpatient Prescriptions   Medication Sig Dispense Refill     [START ON 3/10/2017] amoxicillin-clavulanate (AUGMENTIN) 875-125 MG per tablet Take 1 tablet by mouth 2 times daily for 10 days 20 tablet 0     DULoxetine (CYMBALTA) 30 MG EC capsule TAKE 1 CAPSULE (30 MG) BY MOUTH 2 TIMES DAILY 60 capsule 4     fexofenadine (ALLEGRA) 180 MG tablet Take 1 tablet (180 mg) by mouth daily 90 tablet 1     gabapentin (NEURONTIN) 300 MG capsule Take 3 capsules (900 mg) by mouth 3 times daily 270 capsule 11     amitriptyline (ELAVIL) 50 MG tablet Take 1 tablet (50 mg) by mouth At Bedtime 30 tablet 11     albuterol (PROAIR HFA, PROVENTIL HFA, VENTOLIN HFA) 108 (90 BASE) MCG/ACT inhaler Inhale 2 puffs into the lungs every 6 hours as needed for shortness of breath / dyspnea or wheezing (Patient not taking: Reported on 3/7/2017) 1 Inhaler 1     sildenafil (VIAGRA) 100 MG tablet Take 0.5-1 tablets ( mg) by mouth daily as needed for erectile dysfunction Take 30 min to 4 hours before intercourse.  Never use with nitroglycerin, terazosin or doxazosin. (Patient not taking: Reported on 3/7/2017) 12 tablet 11     Social History   Substance Use Topics      Smoking status: Never Smoker     Smokeless tobacco: Never Used     Alcohol use No     Allergies   Allergen Reactions     Birch Trees      Cats      Dogs      Nuts      Seasonal Allergies      ROS:  Review of systems negative except as stated above.    OBJECTIVE:  /85 (BP Location: Left arm)  Pulse 96  Temp 99.7  F (37.6  C) (Temporal)  GENERAL APPEARANCE: healthy, alert and no distress  EYES: PERRL, conjunctiva clear  HENT: Pain with palpation over frontal and maxillary sinuses. Ear canals normal TMs with mild serous effusions bilaterally. Nasal turbinates edematous and boggy with a blue hue bilaterally. Posterior pharynx is not erythematous.  NECK: supple, nontender, no lymphadenopathy  RESP: lungs clear to auscultation - no rales, rhonchi or wheezes  CV: regular rates and rhythm, normal S1 S2, no murmur noted    ASSESSMENT:    ICD-10-CM    1. Acute sinusitis with coexisting condition, need prophylactic treatment J01.90 amoxicillin-clavulanate (AUGMENTIN) 875-125 MG per tablet   2. Seasonal allergic rhinitis, unspecified allergic rhinitis trigger J30.2      PLAN:   Patient Instructions   Most sinus infections are viral and will go away on their own with out an antibiotic.  Fill you prescription if symptoms worsen after 7 days or do not improve after 10 days.  Prescription will be available to fill if needed on March 9.  Augmentin (amoxicillin-clavulanate) 875mg twice daily for 10 days as directed.  Take Advil cold and sinus behind the pharmacist counter.  Take Tylenol as needed for pain.  Afrin (oxymetazoline) nasal spray twice daily for 3 days. Stop after 3 days.  Flonase 2 sprays in each nostril daily until symptoms resolve, then continue 1 spray in each nostril for at least 5 more days.  May use netti pot with bottled or distilled water and saline packets to flush sinuses.  Mucinex (guiafenesin) thins mucus and may help it to loosen more quickly  Saline drops or nasal sprays may loosen mucus.  Sit in the  bathroom with the door closed and hot shower running to loosen mucus.  Contact primary care clinic if you do not have any relief from your symptoms after 10 days.  Present to emergency room for significantly increasing pain, persistent high fever >102F, swelling/redness around your eyes, changes in your vision or ability to move your eyes, altered mental status or a severe headache.    Follow up with primary care provider with any problems, questions or concerns or if symptoms worsen or fail to improve. Patient agreed to plan and verbalized understanding.    Lachelle Madden PA-C  [facility]

## 2017-03-07 NOTE — NURSING NOTE
"Chief Complaint   Patient presents with     Sinus Problem     x 4 days, sinus congestion and pain, no fevers but pt has the chills       Initial /85 (BP Location: Left arm)  Pulse 96  Temp 99.7  F (37.6  C) (Temporal) Estimated body mass index is 30.27 kg/(m^2) as calculated from the following:    Height as of 5/11/16: 5' 11\" (1.803 m).    Weight as of 5/11/16: 217 lb (98.4 kg).  Medication Reconciliation: complete    "

## 2017-03-07 NOTE — PATIENT INSTRUCTIONS
Most sinus infections are viral and will go away on their own with out an antibiotic.  Fill you prescription if symptoms worsen after 7 days or do not improve after 10 days.  Prescription will be available to fill if needed on March 9.  Augmentin (amoxicillin-clavulanate) 875mg twice daily for 10 days as directed.  Take Advil cold and sinus behind the pharmacist counter.  Take Tylenol as needed for pain.  Afrin (oxymetazoline) nasal spray twice daily for 3 days. Stop after 3 days.  Flonase 2 sprays in each nostril daily until symptoms resolve, then continue 1 spray in each nostril for at least 5 more days.  May use netti pot with bottled or distilled water and saline packets to flush sinuses.  Mucinex (guiafenesin) thins mucus and may help it to loosen more quickly  Saline drops or nasal sprays may loosen mucus.  Sit in the bathroom with the door closed and hot shower running to loosen mucus.  Contact primary care clinic if you do not have any relief from your symptoms after 10 days.  Present to emergency room for significantly increasing pain, persistent high fever >102F, swelling/redness around your eyes, changes in your vision or ability to move your eyes, altered mental status or a severe headache.

## 2017-03-16 ENCOUNTER — OFFICE VISIT (OUTPATIENT)
Dept: PSYCHOLOGY | Facility: CLINIC | Age: 47
End: 2017-03-16
Payer: COMMERCIAL

## 2017-03-16 DIAGNOSIS — F40.10 SOCIAL ANXIETY DISORDER: ICD-10-CM

## 2017-03-16 DIAGNOSIS — F41.1 GENERALIZED ANXIETY DISORDER: Primary | ICD-10-CM

## 2017-03-16 PROCEDURE — 90834 PSYTX W PT 45 MINUTES: CPT | Performed by: MARRIAGE & FAMILY THERAPIST

## 2017-03-16 NOTE — PROGRESS NOTES
Progress Note    Client Name: Antonio Ford  Date: 3/16/2017       Service Type: Individual      Session Start Time: 9:30am Session End Time: 10:15am   Session Length: 45 - 50     Session #: 74     Attendees: Client attended alone      PHQ-9 / FRANCISCO JAVIER-7 Due Date:  declined  DATA    Treatment Objective(s) Addressed in This Session:  ID current stressors contributing to anxiety      Progress on / Status of Treatment Objective(s) / Homework:  Satisfactory progress - ACTION (Actively working towards change); Intervened by reinforcing change plan / affirming steps taken continues to work on reducing anxiety, working on improving relationship with girlfriend, actively job seeking.     Intervention:   CBT, solution focused   Client reports he was laid off from his job this past week due to financial issues his company was having. Reports his girlfriend is anxious about client without job. Client is planning to connect with several networking groups, has already networked with several friends about open positions, applied for jobs, and spoken to several recruiters. Client has already applied for unemployment. Discussed some of girlfriends hx with son's father, raising herself due to mother being unavailable and insecure attachment to her parents. Encouraged client to consider reading a it about insecure attachment, and discussed how this can impact their relationship. Continue to encouraged client to be a steady support for girlfriend, and re assure her. He hasn't shared the awareness wheel with girlfriend but plans to do so. Reports son continues to struggle in school and he continues to maintain boundaries with son about school work. Report overall mood remains well, does report increased anxiety related to stressors.     90-day Treatment Plan review completed: yes    Current Stressors / Issues:  family, financial, dating/social, medical, occupational    Medication Review:  No  changes to current psychiatric medication(s)   Current Outpatient Prescriptions   Medication     amoxicillin-clavulanate (AUGMENTIN) 875-125 MG per tablet     DULoxetine (CYMBALTA) 30 MG EC capsule     albuterol (PROAIR HFA, PROVENTIL HFA, VENTOLIN HFA) 108 (90 BASE) MCG/ACT inhaler     fexofenadine (ALLEGRA) 180 MG tablet     sildenafil (VIAGRA) 100 MG tablet     gabapentin (NEURONTIN) 300 MG capsule     amitriptyline (ELAVIL) 50 MG tablet     No current facility-administered medications for this visit.          Medication Compliance:  Yes    Changes in Health Issues:   None reported    Chemical Use Review:   Substance Use: Chemical use reviewed, no active concerns identified      Tobacco Use: No current tobacco use.        ASSESSMENT: Current Emotional / Mental Status (status of significant symptoms):    Risk status (Self / Other harm or suicidal ideation)  Client denies current fears or concerns for personal safety.  Client denies current or recent suicidal ideation or behaviors.  Client denies current or recent homicidal ideation or behaviors.  Client denies current or recent self injurious behavior or ideation.  Client denies other safety concerns.  A safety and risk management plan has not been developed at this time, however client was given the after-hours number should there be a change in any of these risk factors.    Appearance:   Appropriate   Eye Contact:   Good   Psychomotor Behavior: Normal   Attitude:   Cooperative   Orientation:   All  Speech   Rate / Production: Normal    Volume:  Normal   Mood:    Anxious   Affect:    Appropriate   Thought Content:  Clear   Thought Form:  Coherent  Logical   Insight:    Fair     Collateral Reports Completed:  Not Applicable    PLAN: (Homework, other)  Continue to encourage use of a worry time/thought confinement, thought refuting, building up hobbies/interests, increased socialization, use of exercise and distractions.  Continue to encourage client to utilize  positive self affirmations and prayer when feeling anxious or doubting to improve the moment. Client will talk with girlfriend about thoughts, feelings, and wants. Considering reading on insecure attachment. Join networking groups, and connect with recruiters.       Debbie Schreiber                                                     ________________________________________________________________________    Treatment Plan    Client's Name: Antonio Ford  YOB: 1970    Date: 8/20/2014    DSM-IV Diagnoses    AXIS I: 300.02 FRANCISCO JAVIER, 300.23 Social Anxiety Disorder  Referral / Collaboration:  Collaboration was initiated with PCP MD.    Anticipated number of session or this episode of care: 75-80      MeasurableTreatment Goal(s) related to diagnosis / functional impairment(s)  Goal 1:Depression and anxiety: Client will decrease depressed mood and anxiety as evidenced by PHQ9 and GAD7 scores 4 and Under in the next 6 months. scoring 9/1/2015:  GAD7: 14. 1/10/2017: PHQ9:4, GAD7:3     I will know I've met my goal when I'm feeling less depressed and anxious, and happier in my relationship/my marriage is improving.    Objective #A (Client Action)  Status: Continued - Date(s):3/16/2017  Client will identify 1-2 initial signs or symptoms of anxiety and utilize anxiety reduction techniques to decrease anxiety. Client will ID triggers for depression and anxiety and work towards decreasing reactivity to or eliminating stressor if possible. client will develop more effective coping skills to manage depression and anxiety symptoms, will develop healthy cognitive patterns and beliefs, will increase ability to function adaptively and will continue to take medications as prescribed / participate in supportive activities. Client will improve communication and assertiveness skills and learn to set boundaries with others.    Intervention(s)   Therapist will teach client how to ID body cues for anxiety, anxiety reduction  techniques, how to ID triggers for depression and anxiety- decrease reactivity/eliminate, lifestyle changes to reduce depression and anxiety, communication skills, explore cognitive beliefs and help client to develop healthy cognitive patterns and beliefs. Grief counseling.       Client has reviewed and agreed to the above plan.      Debbie Schreiber

## 2017-03-16 NOTE — MR AVS SNAPSHOT
MRN:[id number]                      After Visit Summary   3/16/2017    Antonio Ford    MRN: [id number]           Visit Information        Provider Department      3/16/2017 9:30 AM Debbie Bro [facility]      Your next 10 appointments already scheduled     Mar 21, 2017 10:00 AM CDT   Return Visit with Debbie Bro   [facility] ([facility])    [address]  [facility] [id number] [phone]            Mar 28, 2017  8:00 AM CDT   Return Visit with Debbie Bro   [facility] ([facility])    [address]  [facility] [address]   [phone]              MyChart Information     [employer] gives you secure access to your electronic health record. If you see a primary care provider, you can also send messages to your care team and make appointments. If you have questions, please call your primary care clinic.  If you do not have a primary care provider, please call [phone] and they will assist you.        Care EveryWhere ID     This is your Care EveryWhere ID. This could be used by other organizations to access your [facility] medical records  [id number]

## 2017-03-29 ENCOUNTER — OFFICE VISIT (OUTPATIENT)
Dept: PSYCHOLOGY | Facility: CLINIC | Age: 47
End: 2017-03-29
Payer: COMMERCIAL

## 2017-03-29 DIAGNOSIS — F41.1 GENERALIZED ANXIETY DISORDER: Primary | ICD-10-CM

## 2017-03-29 PROCEDURE — 90834 PSYTX W PT 45 MINUTES: CPT | Performed by: MARRIAGE & FAMILY THERAPIST

## 2017-03-29 NOTE — PROGRESS NOTES
Progress Note    Client Name: Antonio Ford  Date: 3/29/2017       Service Type: Individual      Session Start Time: 1:05pm  Session End Time: 1:50pm   Session Length: 45 - 50     Session #: 75     Attendees: Client attended alone      PHQ-9 / FRANCISCO JAVIER-7 Due Date:  declined  DATA    Treatment Objective(s) Addressed in This Session:  Client will challenge negative thoughts when they occur and utilize positive self talk      Progress on / Status of Treatment Objective(s) / Homework:  Satisfactory progress - ACTION (Actively working towards change); Intervened by reinforcing change plan / affirming steps taken continues to work on reducing anxiety, working on improving relationship with girlfriend, actively job seeking.     Intervention:   CBT, solution focused   Discussed automatic negative thoughts today. Discussed seeing the fact vs anxious thought, challenging negative thought, and focusing on positive thoughts and self affirmations. Encouraged use of prayer to reduce anxiety as this has been a good coping skill for him in the past. Briefly discussed several stressors: unemployment and job seeking, and ex wife claiming 2 kids so client's tax return is smaller. Encouraged client to consider connecting with several area networking groups for job seekers.     90-day Treatment Plan review completed: yes    Current Stressors / Issues:  family, financial, dating/social, medical, occupational    Medication Review:  No changes to current psychiatric medication(s)   Current Outpatient Prescriptions   Medication     DULoxetine (CYMBALTA) 30 MG EC capsule     albuterol (PROAIR HFA, PROVENTIL HFA, VENTOLIN HFA) 108 (90 BASE) MCG/ACT inhaler     fexofenadine (ALLEGRA) 180 MG tablet     sildenafil (VIAGRA) 100 MG tablet     gabapentin (NEURONTIN) 300 MG capsule     amitriptyline (ELAVIL) 50 MG tablet     No current facility-administered medications for this visit.          Medication  Compliance:  Yes    Changes in Health Issues:   None reported    Chemical Use Review:   Substance Use: Chemical use reviewed, no active concerns identified      Tobacco Use: No current tobacco use.        ASSESSMENT: Current Emotional / Mental Status (status of significant symptoms):    Risk status (Self / Other harm or suicidal ideation)  Client denies current fears or concerns for personal safety.  Client denies current or recent suicidal ideation or behaviors.  Client denies current or recent homicidal ideation or behaviors.  Client denies current or recent self injurious behavior or ideation.  Client denies other safety concerns.  A safety and risk management plan has not been developed at this time, however client was given the after-hours number should there be a change in any of these risk factors.    Appearance:   Appropriate   Eye Contact:   Good   Psychomotor Behavior: Normal   Attitude:   Cooperative   Orientation:   All  Speech   Rate / Production: Normal    Volume:  Normal   Mood:    Anxious   Affect:    Appropriate   Thought Content:  Clear   Thought Form:  Coherent  Logical   Insight:    Fair     Collateral Reports Completed:  Not Applicable    PLAN: (Homework, other)  Continue to encourage use of a worry time/thought confinement, use of exercise and distractions. Client will challenge negative thoughts when they occur and utilize positive self talk  Continue to encourage client to utilize positive self affirmations and prayer when feeling anxious or doubting to improve the moment. Join networking groups, and connect with recruiters.       Debbie Schreiber                                                     ________________________________________________________________________    Treatment Plan    Client's Name: Antonio Ford  YOB: 1970    Date: 8/20/2014    DSM-IV Diagnoses    AXIS I: 300.02 FRANCISCO JAVIER, 300.23 Social Anxiety Disorder  Referral / Collaboration:  Collaboration was initiated  with PCP MD.    Anticipated number of session or this episode of care: 75-80      MeasurableTreatment Goal(s) related to diagnosis / functional impairment(s)  Goal 1:Depression and anxiety: Client will decrease depressed mood and anxiety as evidenced by PHQ9 and GAD7 scores 4 and Under in the next 6 months. scoring 9/1/2015:  GAD7: 14. 1/10/2017: PHQ9:4, GAD7:3     I will know I've met my goal when I'm feeling less depressed and anxious, and happier in my relationship/my marriage is improving.    Objective #A (Client Action)  Status: Continued - Date(s):3/29/2017  Client will identify 1-2 initial signs or symptoms of anxiety and utilize anxiety reduction techniques to decrease anxiety. Client will ID triggers for depression and anxiety and work towards decreasing reactivity to or eliminating stressor if possible. client will develop more effective coping skills to manage depression and anxiety symptoms, will develop healthy cognitive patterns and beliefs, will increase ability to function adaptively and will continue to take medications as prescribed / participate in supportive activities. Client will improve communication and assertiveness skills and learn to set boundaries with others.    Intervention(s)   Therapist will teach client how to ID body cues for anxiety, anxiety reduction techniques, how to ID triggers for depression and anxiety- decrease reactivity/eliminate, lifestyle changes to reduce depression and anxiety, communication skills, explore cognitive beliefs and help client to develop healthy cognitive patterns and beliefs. Grief counseling.       Client has reviewed and agreed to the above plan.      Debbie Schreiber

## 2017-03-29 NOTE — MR AVS SNAPSHOT
MRN:[id number]                      After Visit Summary   3/29/2017    Antonio Ford    MRN: [id number]           Visit Information        Provider Department      3/29/2017 1:00 PM Debbie Bro [facility] Generic      Your next 10 appointments already scheduled     Apr 18, 2017  8:00 AM CDT   Return Visit with Debbie Bro   [facility] ([facility])    [address]  [facility] [address] [phone]            May 02, 2017  8:00 AM CDT   Return Visit with Debbie Bro   [facility] ([facility])    [address]  [facility] [address] [phone]              MyChart Information     [employer] gives you secure access to your electronic health record. If you see a primary care provider, you can also send messages to your care team and make appointments. If you have questions, please call your primary care clinic.  If you do not have a primary care provider, please call [phone] and they will assist you.        Care EveryWhere ID     This is your Care EveryWhere ID. This could be used by other organizations to access your [facility] medical records  [id number]

## 2017-04-06 ENCOUNTER — OFFICE VISIT (OUTPATIENT)
Dept: PSYCHOLOGY | Facility: CLINIC | Age: 47
End: 2017-04-06
Payer: COMMERCIAL

## 2017-04-06 DIAGNOSIS — F41.1 GENERALIZED ANXIETY DISORDER: Primary | ICD-10-CM

## 2017-04-06 PROCEDURE — 90834 PSYTX W PT 45 MINUTES: CPT | Performed by: MARRIAGE & FAMILY THERAPIST

## 2017-04-06 NOTE — PROGRESS NOTES
Progress Note    Client Name: Antonio Ford  Date: 4/6/2017       Service Type: Individual      Session Start Time: 11:30am  Session End Time: 12:15pm   Session Length: 45 - 50     Session #: 76     Attendees: Client attended alone      PHQ-9 / FRANCISCO JAVIER-7 Due Date:  declined  DATA    Treatment Objective(s) Addressed in This Session:  Client will challenge negative thoughts when they occur and utilize positive self talk      Progress on / Status of Treatment Objective(s) / Homework:  Satisfactory progress - ACTION (Actively working towards change); Intervened by reinforcing change plan / affirming steps taken continues to work on reducing anxiety, working on improving relationship with girlfriend, actively job seeking.     Intervention:   CBT, solution focused   Client has been connecting with several area networking groups for job seekers which has been positive for him. Client reports he took a spiritual gifts test with his [employer] and shared results with provider. Discussed use of these spiritual gifts in job search process. Client has considered looking into jobs in the past he is passionate about but never followed through and applied. 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 Processed feeling conflicted as his one year anniversary with girlfriend is in a week and he was wanting to propose, but doesn't have the finances or feeling the timing is right. Continue to encourage client to challenge negative thought, and focus positive thoughts and self affirmations.  90-day Treatment Plan review completed: yes    Current Stressors / Issues:  family, financial, dating/social, medical, occupational    Medication Review:  No changes to current psychiatric medication(s)   Current Outpatient Prescriptions   Medication     DULoxetine (CYMBALTA) 30 MG EC capsule     albuterol (PROAIR HFA,  PROVENTIL HFA, VENTOLIN HFA) 108 (90 BASE) MCG/ACT inhaler     fexofenadine (ALLEGRA) 180 MG tablet     sildenafil (VIAGRA) 100 MG tablet     gabapentin (NEURONTIN) 300 MG capsule     amitriptyline (ELAVIL) 50 MG tablet     No current facility-administered medications for this visit.          Medication Compliance:  Yes    Changes in Health Issues:   None reported    Chemical Use Review:   Substance Use: Chemical use reviewed, no active concerns identified      Tobacco Use: No current tobacco use.        ASSESSMENT: Current Emotional / Mental Status (status of significant symptoms):    Risk status (Self / Other harm or suicidal ideation)  Client denies current fears or concerns for personal safety.  Client denies current or recent suicidal ideation or behaviors.  Client denies current or recent homicidal ideation or behaviors.  Client denies current or recent self injurious behavior or ideation.  Client denies other safety concerns.  A safety and risk management plan has not been developed at this time, however client was given the after-hours number should there be a change in any of these risk factors.    Appearance:   Appropriate   Eye Contact:   Good   Psychomotor Behavior: Normal   Attitude:   Cooperative   Orientation:   All  Speech   Rate / Production: Normal    Volume:  Normal   Mood:    Anxious   Affect:    Appropriate   Thought Content:  Clear   Thought Form:  Coherent  Logical   Insight:    Fair     Collateral Reports Completed:  Not Applicable    PLAN: (Homework, other)  Continue to encourage use of a worry time/thought confinement, use of exercise and distractions. Client will challenge negative thoughts when they occur and utilize positive self talk  Continue to encourage client to utilize positive self affirmations and prayer when feeling anxious or doubting to improve the moment. Continue with networking groups, and connect with recruiters. Consider job seeking in areas of passion for him.        Debbie Schreiber                                                     ________________________________________________________________________    Treatment Plan    Client's Name: Antonio Ford  YOB: 1970    Date: 8/20/2014    DSM-IV Diagnoses    AXIS I: 300.02 FRANCISCO JAVIER, 300.23 Social Anxiety Disorder  Referral / Collaboration:  Collaboration was initiated with PCP MD.    Anticipated number of session or this episode of care: 75-80      MeasurableTreatment Goal(s) related to diagnosis / functional impairment(s)  Goal 1:Depression and anxiety: Client will decrease depressed mood and anxiety as evidenced by PHQ9 and GAD7 scores 4 and Under in the next 6 months. scoring 9/1/2015:  GAD7: 14. 1/10/2017: PHQ9:4, GAD7:3     I will know I've met my goal when I'm feeling less depressed and anxious, and happier in my relationship/my marriage is improving.    Objective #A (Client Action)  Status: Continued - Date(s):4/6/2017  Client will identify 1-2 initial signs or symptoms of anxiety and utilize anxiety reduction techniques to decrease anxiety. Client will ID triggers for depression and anxiety and work towards decreasing reactivity to or eliminating stressor if possible. client will develop more effective coping skills to manage depression and anxiety symptoms, will develop healthy cognitive patterns and beliefs, will increase ability to function adaptively and will continue to take medications as prescribed / participate in supportive activities. Client will improve communication and assertiveness skills and learn to set boundaries with others.    Intervention(s)   Therapist will teach client how to ID body cues for anxiety, anxiety reduction techniques, how to ID triggers for depression and anxiety- decrease reactivity/eliminate, lifestyle changes to reduce depression and anxiety, communication skills, explore cognitive beliefs and help client to develop healthy cognitive patterns and beliefs. Grief counseling.        Client has reviewed and agreed to the above plan.      Debbie Schreiber

## 2017-04-06 NOTE — MR AVS SNAPSHOT
MRN:[id number]                      After Visit Summary   4/6/2017    Antonio Ford    MRN: [id number]           Visit Information        Provider Department      4/6/2017 11:30 AM Debbie Bro [facility] Generic      Your next 10 appointments already scheduled     Apr 18, 2017  8:00 AM CDT   Return Visit with Debbie Bro   [facility] ([facility])    [address]  [facility] [address] [phone]            May 02, 2017  8:00 AM CDT   Return Visit with Debbie Bro   [facility] ([facility])    [address]  [facility] [address] [phone]              MyChart Information     [employer] gives you secure access to your electronic health record. If you see a primary care provider, you can also send messages to your care team and make appointments. If you have questions, please call your primary care clinic.  If you do not have a primary care provider, please call [phone] and they will assist you.        Care EveryWhere ID     This is your Care EveryWhere ID. This could be used by other organizations to access your [facility] medical records  [id number]

## 2017-04-18 ENCOUNTER — OFFICE VISIT (OUTPATIENT)
Dept: PSYCHOLOGY | Facility: CLINIC | Age: 47
End: 2017-04-18
Payer: COMMERCIAL

## 2017-04-18 DIAGNOSIS — F41.1 GENERALIZED ANXIETY DISORDER: Primary | ICD-10-CM

## 2017-04-18 PROCEDURE — 90834 PSYTX W PT 45 MINUTES: CPT | Performed by: MARRIAGE & FAMILY THERAPIST

## 2017-04-18 NOTE — MR AVS SNAPSHOT
MRN:[id number]                      After Visit Summary   4/18/2017    Antonio Ford    MRN: [id number]           Visit Information        Provider Department      4/18/2017 8:00 AM Debbie Bro [facility] Generic      Your next 10 appointments already scheduled     May 02, 2017  8:00 AM CDT   Return Visit with Debbie Bro   [facility] ([facility])    [address]  [facility] [id number] [phone]            May 16, 2017  8:00 AM CDT   Return Visit with Debbie Bro   [facility] ([facility])    [address]  [facility] [address] [phone]              MyChart Information     [employer] gives you secure access to your electronic health record. If you see a primary care provider, you can also send messages to your care team and make appointments. If you have questions, please call your primary care clinic.  If you do not have a primary care provider, please call [phone] and they will assist you.        Care EveryWhere ID     This is your Care EveryWhere ID. This could be used by other organizations to access your [facility] medical records  [id number]

## 2017-04-18 NOTE — PROGRESS NOTES
Progress Note    Client Name: Antonio Ford  Date: 4/18/2017       Service Type: Individual      Session Start Time: 8am  Session End Time: 8:50am   Session Length: 45 - 50     Session #: 77     Attendees: Client attended alone      PHQ-9 / FRANCISCO JAVIER-7 Due Date:  declined  DATA    Treatment Objective(s) Addressed in This Session:  will address relationship difficulties in a more adaptive manner  Client will share thoughts, feelings and wants with girlfriend    Progress on / Status of Treatment Objective(s) / Homework:  Satisfactory progress - ACTION (Actively working towards change); Intervened by reinforcing change plan / affirming steps taken continues to work on reducing anxiety, working on improving relationship with girlfriend, actively job seeking.     Intervention:   CBT, solution focused   Client reports job seeking this past 2 weeks has felt increasingly positive. 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 Reports on Monday's he still is attending the job seeking group at [facility] and then in the evening will attend the small group. Reports one year anniversary with girlfriend went ok, but girlfriend was irritable the majority of the day due to the timing around menstrual cycle. Client was able to talk with girlfriend about his concerns around her moodiness, and girlfriend is responding by having IUD removed to hopefully decrease irritability, and see a counselor again. Encouraged client to consider pros/cons of staying in relationship. Processed frustrations related to girlfriend's son being oppositional and at times not listening to adults. Encouraged client to talk with girlfriend about these concerns.      90-day Treatment Plan review completed: yes    Current Stressors / Issues:  family,  financial, dating/social, medical, occupational    Medication Review:  No changes to current psychiatric medication(s)   Current Outpatient Prescriptions   Medication     DULoxetine (CYMBALTA) 30 MG EC capsule     albuterol (PROAIR HFA, PROVENTIL HFA, VENTOLIN HFA) 108 (90 BASE) MCG/ACT inhaler     fexofenadine (ALLEGRA) 180 MG tablet     sildenafil (VIAGRA) 100 MG tablet     gabapentin (NEURONTIN) 300 MG capsule     amitriptyline (ELAVIL) 50 MG tablet     No current facility-administered medications for this visit.          Medication Compliance:  Yes    Changes in Health Issues:   None reported    Chemical Use Review:   Substance Use: Chemical use reviewed, no active concerns identified      Tobacco Use: No current tobacco use.        ASSESSMENT: Current Emotional / Mental Status (status of significant symptoms):    Risk status (Self / Other harm or suicidal ideation)  Client denies current fears or concerns for personal safety.  Client denies current or recent suicidal ideation or behaviors.  Client denies current or recent homicidal ideation or behaviors.  Client denies current or recent self injurious behavior or ideation.  Client denies other safety concerns.  A safety and risk management plan has not been developed at this time, however client was given the after-hours number should there be a change in any of these risk factors.    Appearance:   Appropriate   Eye Contact:   Good   Psychomotor Behavior: Normal   Attitude:   Cooperative   Orientation:   All  Speech   Rate / Production: Normal    Volume:  Normal   Mood:    Anxious   Affect:    Appropriate   Thought Content:  Clear   Thought Form:  Coherent  Logical   Insight:    Fair     Collateral Reports Completed:  Not Applicable    PLAN: (Homework, other)  Client will challenge negative thoughts when they occur and utilize positive self talk. Continue with networking groups, and connect with recruiters. Consider job seeking in areas of passion for him.  Cliernt will consider whether present relationship is a good choice for him, and talk to girlfriend about on going concerns.       Debbie Schreiber                                                     ________________________________________________________________________    Treatment Plan    Client's Name: Antonio Ford  YOB: 1970    Date: 8/20/2014    DSM-IV Diagnoses    AXIS I: 300.02 FRANCISCO JAVIER, 300.23 Social Anxiety Disorder  Referral / Collaboration:  Collaboration was initiated with PCP MD.    Anticipated number of session or this episode of care: 75-80      MeasurableTreatment Goal(s) related to diagnosis / functional impairment(s)  Goal 1:Depression and anxiety: Client will decrease depressed mood and anxiety as evidenced by PHQ9 and GAD7 scores 4 and Under in the next 6 months. scoring 9/1/2015:  GAD7: 14. 1/10/2017: PHQ9:4, GAD7:3     I will know I've met my goal when I'm feeling less depressed and anxious, and happier in my relationship/my marriage is improving.    Objective #A (Client Action)  Status: Continued - Date(s):4/18/2017  Client will identify 1-2 initial signs or symptoms of anxiety and utilize anxiety reduction techniques to decrease anxiety. Client will ID triggers for depression and anxiety and work towards decreasing reactivity to or eliminating stressor if possible. client will develop more effective coping skills to manage depression and anxiety symptoms, will develop healthy cognitive patterns and beliefs, will increase ability to function adaptively and will continue to take medications as prescribed / participate in supportive activities. Client will improve communication and assertiveness skills and learn to set boundaries with others.    Intervention(s)   Therapist will teach client how to ID body cues for anxiety, anxiety reduction techniques, how to ID triggers for depression and anxiety- decrease reactivity/eliminate, lifestyle changes to reduce depression and anxiety,  communication skills, explore cognitive beliefs and help client to develop healthy cognitive patterns and beliefs. Grief counseling.       Client has reviewed and agreed to the above plan.      Debbie Schreiber

## 2017-04-20 ENCOUNTER — OFFICE VISIT (OUTPATIENT)
Dept: PSYCHOLOGY | Facility: CLINIC | Age: 47
End: 2017-04-20
Payer: COMMERCIAL

## 2017-04-20 DIAGNOSIS — F41.1 GENERALIZED ANXIETY DISORDER: Primary | ICD-10-CM

## 2017-04-20 PROCEDURE — 90834 PSYTX W PT 45 MINUTES: CPT | Performed by: MARRIAGE & FAMILY THERAPIST

## 2017-04-20 NOTE — MR AVS SNAPSHOT
MRN:[id number]                      After Visit Summary   4/20/2017    Antonio Ford    MRN: [id number]           Visit Information        Provider Department      4/20/2017 10:30 AM Debbie Bro [facility] Generic      Your next 10 appointments already scheduled     Apr 25, 2017 12:00 PM CDT   Return Visit with Debbie Hubbardlure   [facility] ([facility])    [address]  [facility] [address]   [phone]            May 02, 2017  8:00 AM CDT   Return Visit with Debbie Bro   [facility] ([facility])    [address]  [facility] [address]   [phone]            May 16, 2017  8:00 AM CDT   Return Visit with Debbie Bro   [facility] ([facility])    [address]  [facility] [address]   [phone]              MyChart Information     [employer] gives you secure access to your electronic health record. If you see a primary care provider, you can also send messages to your care team and make appointments. If you have questions, please call your primary care clinic.  If you do not have a primary care provider, please call [phone] and they will assist you.        Care EveryWhere ID     This is your Care EveryWhere ID. This could be used by other organizations to access your [place] medical records  [id number]

## 2017-04-20 NOTE — PROGRESS NOTES
Progress Note    Client Name: Antonio Ford  Date: 4/20/2017       Service Type: Individual      Session Start Time: 10:30am  Session End Time: 11:15am   Session Length: 45 - 50     Session #: 78     Attendees: Client attended alone      PHQ-9 / FRANCISCO JAVIER-7 Due Date:  declined  DATA    Treatment Objective(s) Addressed in This Session:  will experience a reduction in depressed mood and will develop more effective coping skills to manage depressive symptoms  will develop more effective coping skills to manage anxiety symptoms  Client will share thoughts, feelings and wants with girlfriend    Progress on / Status of Treatment Objective(s) / Homework:  Satisfactory progress - ACTION (Actively working towards change); Intervened by reinforcing change plan / affirming steps taken continues to work on reducing anxiety, working on improving relationship with girlfriend, actively job seeking.     Intervention:   CBT, solution focused   Client reports yesterday after girlfriend had her IUD taken out, got her boot off the foot that had been broken, and saw her new therapist she broke up with client. Girlfriend stated that she couldn't get past client's lack of motivation, lack of job, and she didn't think it would be fair to give him false hope things were going to work out. She set the limit that they are not going to work things out, but she is willing to be friends. Client is very upset about this and feeling he wants to change to get her back. Discussed parallels in past relationships, and discussed in all relationships he has dated women who are controllers and often point out client's flaws but do not take responsibility for their own flaws. Encouraged client to focus on healing himself and building up his self confidence. Encouraged client to spend some time in prayer and reading the bible and asking God to guide his path and help him to build himself back up as this is an area he  does not feel confident in. Encouraged client to set structure to his daily routine: connect with the [facility] about a possible scholarship while unemployed, continue running, spend increased time connecting with family and friends, and spend time job seeking in areas of passion for him. Client is planning to see a chiropractor today that may have a plan related to his health issues.       90-day Treatment Plan review completed: yes    Current Stressors / Issues:  family, financial, dating/social, medical, occupational    Medication Review:  No changes to current psychiatric medication(s)   Current Outpatient Prescriptions   Medication     DULoxetine (CYMBALTA) 30 MG EC capsule     albuterol (PROAIR HFA, PROVENTIL HFA, VENTOLIN HFA) 108 (90 BASE) MCG/ACT inhaler     fexofenadine (ALLEGRA) 180 MG tablet     sildenafil (VIAGRA) 100 MG tablet     gabapentin (NEURONTIN) 300 MG capsule     amitriptyline (ELAVIL) 50 MG tablet     No current facility-administered medications for this visit.          Medication Compliance:  Yes    Changes in Health Issues:   None reported    Chemical Use Review:   Substance Use: Chemical use reviewed, no active concerns identified      Tobacco Use: No current tobacco use.        ASSESSMENT: Current Emotional / Mental Status (status of significant symptoms):    Risk status (Self / Other harm or suicidal ideation)  Client denies current fears or concerns for personal safety.  Client denies current or recent suicidal ideation or behaviors.  Client denies current or recent homicidal ideation or behaviors.  Client denies current or recent self injurious behavior or ideation.  Client denies other safety concerns.  A safety and risk management plan has not been developed at this time, however client was given the after-hours number should there be a change in any of these risk factors.    Appearance:   Appropriate   Eye Contact:   Good   Psychomotor Behavior: Normal   Attitude:   Cooperative    Orientation:   All  Speech   Rate / Production: Normal    Volume:  Normal   Mood:    Anxious  Depressed  Sad   Affect:    Appropriate   Thought Content:  Clear   Thought Form:  Coherent  Logical   Insight:    Fair     Collateral Reports Completed:  Not Applicable    PLAN: (Homework, other)  Client will challenge negative thoughts when they occur and utilize positive self talk. Continue with networking groups, and connect with recruiters Encouraged client to focus on healing himself and building up his self confidence. Encouraged client to spend some time in prayer and reading the bible and asking God to guide his path and help him to build himself back up. Encouraged client to set structure to his daily routine: connect with the [facility] about a possible scholarship while unemployed, continue running, spend increased time connecting with family and friends, and spend time job seeking in areas of passion for him.         Debbie Schreiber                                                     ________________________________________________________________________    Treatment Plan    Client's Name: Antonio Ford  YOB: 1970    Date: 8/20/2014    DSM-IV Diagnoses    AXIS I: 300.02 FRANCISCO JAVIER, 300.23 Social Anxiety Disorder  Referral / Collaboration:  Collaboration was initiated with PCP MD.    Anticipated number of session or this episode of care: 75-80      MeasurableTreatment Goal(s) related to diagnosis / functional impairment(s)  Goal 1:Depression and anxiety: Client will decrease depressed mood and anxiety as evidenced by PHQ9 and GAD7 scores 4 and Under in the next 6 months. scoring 9/1/2015:  GAD7: 14. 1/10/2017: PHQ9:4, GAD7:3     I will know I've met my goal when I'm feeling less depressed and anxious, and happier in my relationship/my marriage is improving.    Objective #A (Client Action)  Status: Continued - Date(s):4/20/2017  Client will identify 1-2 initial signs or symptoms of anxiety and utilize  anxiety reduction techniques to decrease anxiety. Client will ID triggers for depression and anxiety and work towards decreasing reactivity to or eliminating stressor if possible. client will develop more effective coping skills to manage depression and anxiety symptoms, will develop healthy cognitive patterns and beliefs, will increase ability to function adaptively and will continue to take medications as prescribed / participate in supportive activities. Client will improve communication and assertiveness skills and learn to set boundaries with others.    Intervention(s)   Therapist will teach client how to ID body cues for anxiety, anxiety reduction techniques, how to ID triggers for depression and anxiety- decrease reactivity/eliminate, lifestyle changes to reduce depression and anxiety, communication skills, explore cognitive beliefs and help client to develop healthy cognitive patterns and beliefs. Grief counseling.       Client has reviewed and agreed to the above plan.      Debbie Schreiber

## 2017-04-25 ENCOUNTER — OFFICE VISIT (OUTPATIENT)
Dept: PSYCHOLOGY | Facility: CLINIC | Age: 47
End: 2017-04-25
Payer: COMMERCIAL

## 2017-04-25 DIAGNOSIS — F41.1 GENERALIZED ANXIETY DISORDER: Primary | ICD-10-CM

## 2017-04-25 DIAGNOSIS — F40.10 SOCIAL ANXIETY DISORDER: ICD-10-CM

## 2017-04-25 PROCEDURE — 90834 PSYTX W PT 45 MINUTES: CPT | Performed by: MARRIAGE & FAMILY THERAPIST

## 2017-04-25 NOTE — MR AVS SNAPSHOT
MRN:[id number]                      After Visit Summary   4/25/2017    Antonio Ford    MRN: [id number]           Visit Information        Provider Department      4/25/2017 12:00 PM Debbie Bro [facility]      Your next 10 appointments already scheduled     May 02, 2017  8:00 AM CDT   Return Visit with Debbie Hubbardlure   [facility]    [address]  [facility] [address]   [phone]            May 16, 2017  8:00 AM CDT   Return Visit with Debbie Hubbardlure   [facility]    [address]  [facility] [address]   [phone]            May 31, 2017  3:00 PM CDT   Return Visit with Debbie Bro   [facility]    [address]  [facility] [address]   [phone]              MyChart Information     [employer] gives you secure access to your electronic health record. If you see a primary care provider, you can also send messages to your care team and make appointments. If you have questions, please call your primary care clinic.  If you do not have a primary care provider, please call [phone] and they will assist you.        Care EveryWhere ID     This is your Care EveryWhere ID. This could be used by other organizations to access your [place] medical records  [id number]

## 2017-04-25 NOTE — PROGRESS NOTES
Progress Note    Client Name: Antonio Ford  Date: 4/25/2017       Service Type: Individual      Session Start Time: 12pm  Session End Time: 12:45pm   Session Length: 45 - 50     Session #: 79     Attendees: Client attended alone      PHQ-9 / FRANCISCO JAVIER-7 Due Date:  declined  DATA    Treatment Objective(s) Addressed in This Session:  will experience a reduction in depressed mood and will develop more effective coping skills to manage depressive symptoms  will develop more effective coping skills to manage anxiety symptoms      Progress on / Status of Treatment Objective(s) / Homework:  Satisfactory progress - ACTION (Actively working towards change); Intervened by reinforcing change plan / affirming steps taken continues to work on reducing anxiety, working on improving relationship with girlfriend, actively job seeking.     Intervention:   CBT, solution focused   Client reports he remains single and has had some contact via email and texting with ex. Encouraged client to allow space between them so they can work on themselves. Discussed defriending her on Facebook so he didn't have to see what she is doing as he feels he is borderline obsessing over what she is up to now that they are broken up. E ncouraged client to set structure to his daily routine: connect with the [facility] about a possible scholarship while unemployed, continue exercising, spend increased time connecting with family and friends, and spend time job seeking in areas of passion for him.Client reports he had an interview last week with [employer] which he feels positive an at peace with. Continues to job seek. Reports he spoke with friend who had been his emplyer at [employer] on Sunday and they talked out their issues. Client reports he has been spending increased time to connect with friends and family which has been positive. Discussed starting to look at early recollections next session to discuss deeper rooted issues  related to self esteem and anxiety.   90-day Treatment Plan review completed: yes    Current Stressors / Issues:  family, financial, dating/social, medical, occupational    Medication Review:  No changes to current psychiatric medication(s)   Current Outpatient Prescriptions   Medication     DULoxetine (CYMBALTA) 30 MG EC capsule     albuterol (PROAIR HFA, PROVENTIL HFA, VENTOLIN HFA) 108 (90 BASE) MCG/ACT inhaler     fexofenadine (ALLEGRA) 180 MG tablet     sildenafil (VIAGRA) 100 MG tablet     gabapentin (NEURONTIN) 300 MG capsule     amitriptyline (ELAVIL) 50 MG tablet     No current facility-administered medications for this visit.          Medication Compliance:  Yes    Changes in Health Issues:   None reported    Chemical Use Review:   Substance Use: Chemical use reviewed, no active concerns identified      Tobacco Use: No current tobacco use.        ASSESSMENT: Current Emotional / Mental Status (status of significant symptoms):    Risk status (Self / Other harm or suicidal ideation)  Client denies current fears or concerns for personal safety.  Client denies current or recent suicidal ideation or behaviors.  Client denies current or recent homicidal ideation or behaviors.  Client denies current or recent self injurious behavior or ideation.  Client denies other safety concerns.  A safety and risk management plan has not been developed at this time, however client was given the after-hours number should there be a change in any of these risk factors.    Appearance:   Appropriate   Eye Contact:   Good   Psychomotor Behavior: Normal   Attitude:   Cooperative   Orientation:   All  Speech   Rate / Production: Normal    Volume:  Normal   Mood:    Anxious  Depressed  Sad   Affect:    Appropriate   Thought Content:  Clear   Thought Form:  Coherent  Logical   Insight:    Fair     Collateral Reports Completed:  Not Applicable    PLAN: (Homework, other)  Client will challenge negative thoughts when they occur and  utilize positive self talk. Continue with networking groups, and connect with recruiters Encouraged client to focus on healing himself and building up his self confidence. Encouraged client to spend some time in prayer and reading the bible and asking God to guide his path and help him to build himself back up. Encouraged client to set structure to his daily routine: connect with the [facility] about a possible scholarship while unemployed, continue running, spend increased time connecting with family and friends, and spend time job seeking in areas of passion for him. Plan to start Early Recollections next session.         Debbie Schreiber                                                     ________________________________________________________________________    Treatment Plan    Client's Name: Antonio Ford  YOB: 1970    Date: 8/20/2014    DSM-IV Diagnoses    AXIS I: 300.02 FRANCISCO JAVIER, 300.23 Social Anxiety Disorder  Referral / Collaboration:  Collaboration was initiated with PCP MD.    Anticipated number of session or this episode of care: 75-80      MeasurableTreatment Goal(s) related to diagnosis / functional impairment(s)  Goal 1:Depression and anxiety: Client will decrease depressed mood and anxiety as evidenced by PHQ9 and GAD7 scores 4 and Under in the next 6 months. scoring 9/1/2015:  GAD7: 14. 1/10/2017: PHQ9:4, GAD7:3     I will know I've met my goal when I'm feeling less depressed and anxious, and happier in my relationship/my marriage is improving.    Objective #A (Client Action)  Status: Continued - Date(s):4/25/2017  Client will identify 1-2 initial signs or symptoms of anxiety and utilize anxiety reduction techniques to decrease anxiety. Client will ID triggers for depression and anxiety and work towards decreasing reactivity to or eliminating stressor if possible. client will develop more effective coping skills to manage depression and anxiety symptoms, will develop healthy cognitive  patterns and beliefs, will increase ability to function adaptively and will continue to take medications as prescribed / participate in supportive activities. Client will improve communication and assertiveness skills and learn to set boundaries with others.    Intervention(s)   Therapist will teach client how to ID body cues for anxiety, anxiety reduction techniques, how to ID triggers for depression and anxiety- decrease reactivity/eliminate, lifestyle changes to reduce depression and anxiety, communication skills, explore cognitive beliefs and help client to develop healthy cognitive patterns and beliefs. Grief counseling.       Client has reviewed and agreed to the above plan.      Debbie Schreiber

## 2017-05-02 ENCOUNTER — OFFICE VISIT (OUTPATIENT)
Dept: PSYCHOLOGY | Facility: CLINIC | Age: 47
End: 2017-05-02
Payer: COMMERCIAL

## 2017-05-02 DIAGNOSIS — F41.1 GENERALIZED ANXIETY DISORDER: Primary | ICD-10-CM

## 2017-05-02 DIAGNOSIS — F40.10 SOCIAL ANXIETY DISORDER: ICD-10-CM

## 2017-05-02 PROCEDURE — 90834 PSYTX W PT 45 MINUTES: CPT | Performed by: MARRIAGE & FAMILY THERAPIST

## 2017-05-02 NOTE — PROGRESS NOTES
"                                             Progress Note    Client Name: Antonio Ford  Date: 5/2/2017       Service Type: Individual      Session Start Time: 8am  Session End Time: 8:45am   Session Length: 45 - 50     Session #: 80     Attendees: Client attended alone      PHQ-9 / FRANCISCO JAVIER-7 Due Date:  declined  DATA    Treatment Objective(s) Addressed in This Session:  will experience a reduction in depressed mood and will develop more effective coping skills to manage depressive symptoms  will develop more effective coping skills to manage anxiety symptoms      Progress on / Status of Treatment Objective(s) / Homework:  Satisfactory progress - ACTION (Actively working towards change); Intervened by reinforcing change plan / affirming steps taken continues to work on reducing anxiety, working on improving relationship with girlfriend, actively job seeking.     Intervention:   CBT, solution focused   Client reports continued perseveration about ex girlfriend and feeling as though \"She is the one.\" Encouraged client to break contact with ex girlfriend and focus on getting well for himself: building up self confidence, reducing anxiety, and trusting that good things could come from this change rather than focusing on the negative aspects. Client has reconnected with several past friends who are providing support. Encouraged client to set structure to his daily routine: connect with the [facility] about a possible scholarship while unemployed, continue exercising, spend increased time connecting with family and friends, and spend time job seeking in areas of passion for him.Client reports he should hear back from [employer] about position he interviewed for in the next week. Discussed starting to look at early recollections next session to discuss deeper rooted issues related to self esteem and anxiety.     90-day Treatment Plan review completed: yes    Current Stressors / Issues:  family, financial, dating/social, medical, " occupational    Medication Review:  No changes to current psychiatric medication(s)   Current Outpatient Prescriptions   Medication     DULoxetine (CYMBALTA) 30 MG EC capsule     albuterol (PROAIR HFA, PROVENTIL HFA, VENTOLIN HFA) 108 (90 BASE) MCG/ACT inhaler     fexofenadine (ALLEGRA) 180 MG tablet     sildenafil (VIAGRA) 100 MG tablet     gabapentin (NEURONTIN) 300 MG capsule     amitriptyline (ELAVIL) 50 MG tablet     No current facility-administered medications for this visit.          Medication Compliance:  Yes    Changes in Health Issues:   None reported    Chemical Use Review:   Substance Use: Chemical use reviewed, no active concerns identified      Tobacco Use: No current tobacco use.        ASSESSMENT: Current Emotional / Mental Status (status of significant symptoms):    Risk status (Self / Other harm or suicidal ideation)  Client denies current fears or concerns for personal safety.  Client denies current or recent suicidal ideation or behaviors.  Client denies current or recent homicidal ideation or behaviors.  Client denies current or recent self injurious behavior or ideation.  Client denies other safety concerns.  A safety and risk management plan has not been developed at this time, however client was given the after-hours number should there be a change in any of these risk factors.    Appearance:   Appropriate   Eye Contact:   Good   Psychomotor Behavior: Normal   Attitude:   Cooperative   Orientation:   All  Speech   Rate / Production: Normal    Volume:  Normal   Mood:    Anxious  Depressed  Sad   Affect:    Appropriate   Thought Content:  Clear   Thought Form:  Coherent  Logical   Insight:    Fair     Collateral Reports Completed:  Not Applicable    PLAN: (Homework, other)  Client will challenge negative thoughts when they occur and utilize positive self talk. Continue with networking groups, and connect with recruiters Encouraged client to focus on healing himself and building up his self  confidence. Encouraged client to spend some time in prayer and reading the bible and asking God to guide his path and help him to build himself back up. Encouraged client to set structure to his daily routine: connect with the [facility] about a possible scholarship while unemployed, continue running, spend increased time connecting with family and friends, and spend time job seeking in areas of passion for him. Consider breaking contact off with ex. Plan to start Early Recollections next session.         Debbie Schreiber                                                     ________________________________________________________________________    Treatment Plan    Client's Name: Antonio Ford  YOB: 1970    Date: 8/20/2014    DSM-IV Diagnoses    AXIS I: 300.02 FRANCISCO JAVIER, 300.23 Social Anxiety Disorder  Referral / Collaboration:  Collaboration was initiated with PCP MD.    Anticipated number of session or this episode of care: 75-80      MeasurableTreatment Goal(s) related to diagnosis / functional impairment(s)  Goal 1:Depression and anxiety: Client will decrease depressed mood and anxiety as evidenced by PHQ9 and GAD7 scores 4 and Under in the next 6 months. scoring 9/1/2015:  GAD7: 14. 1/10/2017: PHQ9:4, GAD7:3     I will know I've met my goal when I'm feeling less depressed and anxious, and happier in my relationship/my marriage is improving.    Objective #A (Client Action)  Status: Continued - Date(s):5/2/2017  Client will identify 1-2 initial signs or symptoms of anxiety and utilize anxiety reduction techniques to decrease anxiety. Client will ID triggers for depression and anxiety and work towards decreasing reactivity to or eliminating stressor if possible. client will develop more effective coping skills to manage depression and anxiety symptoms, will develop healthy cognitive patterns and beliefs, will increase ability to function adaptively and will continue to take medications as prescribed /  participate in supportive activities. Client will improve communication and assertiveness skills and learn to set boundaries with others.    Intervention(s)   Therapist will teach client how to ID body cues for anxiety, anxiety reduction techniques, how to ID triggers for depression and anxiety- decrease reactivity/eliminate, lifestyle changes to reduce depression and anxiety, communication skills, explore cognitive beliefs and help client to develop healthy cognitive patterns and beliefs. Grief counseling.       Client has reviewed and agreed to the above plan.      Debbie Schreiber

## 2017-05-02 NOTE — MR AVS SNAPSHOT
After Visit Summary   5/2/2017    Antonio Ford    MRN: [id number]           Patient Information     Date Of Birth          1970        Visit Information        Provider Department      5/2/2017 8:00 AM Debbie Bro [facility] Generic      Today's Diagnoses     Generalized anxiety disorder    -  1    Social anxiety disorder           Follow-ups after your visit        Your next 10 appointments already scheduled     May 09, 2017  9:00 AM CDT   Return Visit with Debbie Bro   [facility] ([facility])    [address]  [facility] [address]   [phone]            May 16, 2017  8:00 AM CDT   Return Visit with Debbie Bro   [facility] ([facility])    [address]  [facility] [address]   [phone]            May 31, 2017  3:00 PM CDT   Return Visit with Debbie Bro   [facility] ([facility])    [address]  [facility] [address]   [phone]              Who to contact     If you have questions or need follow up information about today's clinic visit or your schedule please contact [facility] directly at [phone].  Normal or non-critical lab and imaging results will be communicated to you by MyChart, letter or phone within 4 business days after the clinic has received the results. If you do not hear from us within 7 days, please contact the clinic through MyChart or phone. If you have a critical or abnormal lab result, we will notify you by phone as soon as possible.  Submit refill requests through [employer] or call your pharmacy and they will forward the refill request to us. Please allow 3 business days for your refill to be completed.          Additional Information About Your Visit        [facility] Information     [employer] gives you secure access to your electronic health  record. If you see a primary care provider, you can also send messages to your care team and make appointments. If you have questions, please call your primary care clinic.  If you do not have a primary care provider, please call [phone] and they will assist you.        Care EveryWhere ID     This is your Care EveryWhere ID. This could be used by other organizations to access your [place] medical records  [id number]         Blood Pressure from Last 3 Encounters:   03/07/17 138/85   05/11/16 122/76   04/20/16 124/86    Weight from Last 3 Encounters:   05/11/16 217 lb (98.4 kg)   07/07/15 205 lb (93 kg)   12/15/14 198 lb (89.8 kg)              Today, you had the following     No orders found for display       Primary Care Provider Office Phone # Fax #    Josh Morales PA-C [phone]       [facility] [address]  [facility] [address]        Thank you!     Thank you for choosing [facility]  for your care. Our goal is always to provide you with excellent care. Hearing back from our patients is one way we can continue to improve our services. Please take a few minutes to complete the written survey that you may receive in the mail after your visit with us. Thank you!             Your Updated Medication List - Protect others around you: Learn how to safely use, store and throw away your medicines at www.disposemymeds.org.          This list is accurate as of: 5/2/17 11:59 PM.  Always use your most recent med list.                   Brand Name Dispense Instructions for use    albuterol 108 (90 BASE) MCG/ACT Inhaler    PROAIR HFA/PROVENTIL HFA/VENTOLIN HFA    1 Inhaler    Inhale 2 puffs into the lungs every 6 hours as needed for shortness of breath / dyspnea or wheezing       amitriptyline 50 MG tablet    ELAVIL    30 tablet    Take 1 tablet (50 mg) by mouth At Bedtime       DULoxetine 30 MG EC capsule    CYMBALTA    60 capsule    TAKE 1  CAPSULE (30 MG) BY MOUTH 2 TIMES DAILY       fexofenadine 180 MG tablet    ALLEGRA    90 tablet    Take 1 tablet (180 mg) by mouth daily       gabapentin 300 MG capsule    NEURONTIN    270 capsule    Take 3 capsules (900 mg) by mouth 3 times daily       sildenafil 100 MG cap/tab    REVATIO/VIAGRA    12 tablet    Take 0.5-1 tablets ( mg) by mouth daily as needed for erectile dysfunction Take 30 min to 4 hours before intercourse.  Never use with nitroglycerin, terazosin or doxazosin.

## 2017-05-09 ENCOUNTER — OFFICE VISIT (OUTPATIENT)
Dept: PSYCHOLOGY | Facility: CLINIC | Age: 47
End: 2017-05-09
Payer: COMMERCIAL

## 2017-05-09 DIAGNOSIS — F41.1 GENERALIZED ANXIETY DISORDER: Primary | ICD-10-CM

## 2017-05-09 DIAGNOSIS — F40.10 SOCIAL ANXIETY DISORDER: ICD-10-CM

## 2017-05-09 PROCEDURE — 90834 PSYTX W PT 45 MINUTES: CPT | Performed by: MARRIAGE & FAMILY THERAPIST

## 2017-05-09 NOTE — PROGRESS NOTES
"                                             Progress Note    Client Name: Antonio Ford  Date: 5/2/2017       Service Type: Individual      Session Start Time: 9am  Session End Time: 9:50am   Session Length: 45 - 50     Session #: 81     Attendees: Client attended alone      PHQ-9 / FRANCISCO JAVIER-7 Due Date:  declined  DATA    Treatment Objective(s) Addressed in This Session:  will experience a reduction in depressed mood and will develop more effective coping skills to manage depressive symptoms  will develop more effective coping skills to manage anxiety symptoms and will challenge negative thoughts      Progress on / Status of Treatment Objective(s) / Homework:  Satisfactory progress - ACTION (Actively working towards change); Intervened by reinforcing change plan / affirming steps taken continues to work on reducing anxiety, working on improving relationship with girlfriend, actively job seeking.     Intervention:   CBT, solution focused   Client reports continued perseveration about ex girlfriend and feeling as though \"She is the one.\" Client reports last week he was able to block ex for several days and was feeling better. He reports he messaged ex girlfriend's mother and told her that he missed his ex and felt she was the one. Ex's mother was supportive to client but also shared the information with ex whom continues to question whether client can maintain boundaries and just be friends.  Encouraged client to break contact with ex girlfriend and focus on getting well for himself. Encouraged client to create a self affirmation phrase and utilize 30 times daily. Encouraged client to focus on positive self talk, and working on self improvement rather than perseveration on past relationship. Encouraged client to consider reading the book One Thousand Gifts and focusing on the good things in his life. Planning to starting to look at early recollections next session to discuss deeper rooted issues related to self esteem and " anxiety. Plan to also talk with client about persistent need to be in a relationship and not be alone.     90-day Treatment Plan review completed: yes    Current Stressors / Issues:  family, financial, dating/social, medical, occupational    Medication Review:  No changes to current psychiatric medication(s)   Current Outpatient Prescriptions   Medication     DULoxetine (CYMBALTA) 30 MG EC capsule     albuterol (PROAIR HFA, PROVENTIL HFA, VENTOLIN HFA) 108 (90 BASE) MCG/ACT inhaler     fexofenadine (ALLEGRA) 180 MG tablet     sildenafil (VIAGRA) 100 MG tablet     gabapentin (NEURONTIN) 300 MG capsule     amitriptyline (ELAVIL) 50 MG tablet     No current facility-administered medications for this visit.          Medication Compliance:  Yes    Changes in Health Issues:   None reported    Chemical Use Review:   Substance Use: Chemical use reviewed, no active concerns identified      Tobacco Use: No current tobacco use.        ASSESSMENT: Current Emotional / Mental Status (status of significant symptoms):    Risk status (Self / Other harm or suicidal ideation)  Client denies current fears or concerns for personal safety.  Client denies current or recent suicidal ideation or behaviors.  Client denies current or recent homicidal ideation or behaviors.  Client denies current or recent self injurious behavior or ideation.  Client denies other safety concerns.  A safety and risk management plan has not been developed at this time, however client was given the after-hours number should there be a change in any of these risk factors.    Appearance:   Appropriate   Eye Contact:   Good   Psychomotor Behavior: Normal   Attitude:   Cooperative   Orientation:   All  Speech   Rate / Production: Normal    Volume:  Normal   Mood:    Anxious  Depressed   Affect:    Appropriate   Thought Content:  Clear   Thought Form:  Coherent  Logical   Insight:    Fair     Collateral Reports Completed:  Not Applicable    PLAN: (Homework,  other)  Client will challenge negative thoughts when they occur and utilize positive self talk. Continue with networking groups, and connect with recruiters. Encouraged client to focus on healing himself and building up his self confidence. Encouraged client to spend some time in prayer and reading the bible and asking God to guide his path and help him to build himself back up. Encouraged client to set structure to his daily routine: connect with the [facility] about a possible scholarship while unemployed, continue running, spend increased time connecting with family and friends, and spend time job seeking in areas of passion for him. Consider breaking contact off with ex. Plan to start Early Recollections next session.  Plan to also talk with client about persistent need to be in a relationship and not be alone.  Consider reading One Thousand Gifts.             Debbie Schreiber                                                     ________________________________________________________________________    Treatment Plan    Client's Name: Antonio Ford  YOB: 1970    Date: 8/20/2014    DSM-IV Diagnoses    AXIS I: 300.02 FRANCISCO JAVIER, 300.23 Social Anxiety Disorder  Referral / Collaboration:  Collaboration was initiated with PCP MD.    Anticipated number of session or this episode of care: 75-80      MeasurableTreatment Goal(s) related to diagnosis / functional impairment(s)  Goal 1:Depression and anxiety: Client will decrease depressed mood and anxiety as evidenced by PHQ9 and GAD7 scores 4 and Under in the next 6 months. scoring 9/1/2015:  GAD7: 14. 1/10/2017: PHQ9:4, GAD7:3     I will know I've met my goal when I'm feeling less depressed and anxious, and happier in my relationship/my marriage is improving.    Objective #A (Client Action)  Status: Continued - Date(s):5/9/2017  Client will identify 1-2 initial signs or symptoms of anxiety and utilize anxiety reduction techniques to decrease anxiety. Client will ID  triggers for depression and anxiety and work towards decreasing reactivity to or eliminating stressor if possible. client will develop more effective coping skills to manage depression and anxiety symptoms, will develop healthy cognitive patterns and beliefs, will increase ability to function adaptively and will continue to take medications as prescribed / participate in supportive activities. Client will improve communication and assertiveness skills and learn to set boundaries with others.    Intervention(s)   Therapist will teach client how to ID body cues for anxiety, anxiety reduction techniques, how to ID triggers for depression and anxiety- decrease reactivity/eliminate, lifestyle changes to reduce depression and anxiety, communication skills, explore cognitive beliefs and help client to develop healthy cognitive patterns and beliefs. Grief counseling.       Client has reviewed and agreed to the above plan.      Debbie Schreiber

## 2017-05-09 NOTE — MR AVS SNAPSHOT
MRN:[id number]                      After Visit Summary   5/9/2017    Antonio Ford    MRN: [id number]           Visit Information        Provider Department      5/9/2017 9:00 AM Debbie Bro [facility] Generic      Your next 10 appointments already scheduled     May 16, 2017  8:00 AM CDT   Return Visit with Debbie Bro   [facility] ([facility])    [address]  [facility] [address] [phone]            May 31, 2017  3:00 PM CDT   Return Visit with Debbie Bro   [facility] ([facility])    [address]  [facility] [address] [phone]              MyChart Information     [employer] gives you secure access to your electronic health record. If you see a primary care provider, you can also send messages to your care team and make appointments. If you have questions, please call your primary care clinic.  If you do not have a primary care provider, please call [phone] and they will assist you.        Care EveryWhere ID     This is your Care EveryWhere ID. This could be used by other organizations to access your [place] medical records  [id number]

## 2017-05-16 ENCOUNTER — OFFICE VISIT (OUTPATIENT)
Dept: PSYCHOLOGY | Facility: CLINIC | Age: 47
End: 2017-05-16
Payer: COMMERCIAL

## 2017-05-16 DIAGNOSIS — F40.10 SOCIAL ANXIETY DISORDER: ICD-10-CM

## 2017-05-16 DIAGNOSIS — F41.1 GENERALIZED ANXIETY DISORDER: Primary | ICD-10-CM

## 2017-05-16 PROCEDURE — 90834 PSYTX W PT 45 MINUTES: CPT | Performed by: MARRIAGE & FAMILY THERAPIST

## 2017-05-16 NOTE — MR AVS SNAPSHOT
MRN:[id number]                      After Visit Summary   5/16/2017    Antonio Ford    MRN: [id number]           Visit Information        Provider Department      5/16/2017 8:00 AM Debbie Bro [facility] Generic      Your next 10 appointments already scheduled     May 31, 2017  3:00 PM CDT   Return Visit with Debbie Hubbardlure   [facility] ([facility])    [address]  [facility] [address]   [phone]            Jun 22, 2017 12:00 PM CDT   Return Visit with Debbie Hubbardlure   [facility] ([facility])    [address]  [facility] [address]   [phone]            Jun 28, 2017  8:00 AM CDT   Return Visit with Debbie Bro   [facility] ([facility])    [address]  [facility] [address]   [phone]              MyChart Information     [employer] gives you secure access to your electronic health record. If you see a primary care provider, you can also send messages to your care team and make appointments. If you have questions, please call your primary care clinic.  If you do not have a primary care provider, please call [phone] and they will assist you.        Care EveryWhere ID     This is your Care EveryWhere ID. This could be used by other organizations to access your [place] medical records  [id number]

## 2017-05-16 NOTE — PROGRESS NOTES
Progress Note    Client Name: Antonio Ford  Date: 5/16/2017       Service Type: Individual      Session Start Time: 8am  Session End Time: 8:50am   Session Length: 45 - 50     Session #: 82     Attendees: Client attended alone      PHQ-9 / FRANCISCO JAVIER-7 Due Date:  Declined- plan to update next session  DATA    Treatment Objective(s) Addressed in This Session:  will develop more effective coping skills to manage anxiety symptoms and will challenge negative thoughts  Client will maintain boundaries with son      Progress on / Status of Treatment Objective(s) / Homework:  Satisfactory progress - ACTION (Actively working towards change); Intervened by reinforcing change plan / affirming steps taken continues to work on reducing anxiety, actively job seeking.     Intervention:   CBT, solution focused   Client reports he has been coping with break up better since ex girlfriend unfriended him on Facebook. He has been working to set boundaries for himself related to contact with ex, and focusing on self inprovement. He heard back from [employer] and was told he was overqualified for the position he applied for. Client has been taking steps to connect with job seekers networking groups. Client reports son has been very difficult to handle this past week. Son wants to engage client in a debate and argue his point despite client stepping away and saying he is not wanting to engage in the discussion. Client reports son is tuning 18 on Thursday and is concerned that he maybe push the rules in the household. Encouraged client to set rules and boundaries with son about what is acceptable in his house. Son had wanted to allow friends to vape in their home during the party on Friday. Client told son that if this were to occur the party would be over. Client has been working on using self affirmations daily, and focusing on positive self talk, and working on self improvement rather than perseveration  on past relationship. Encouraged client to consider reading the book One Thousand Gifts and focusing on the good things in his life. Planning to starting to look at early recollections next session to discuss deeper rooted issues related to self esteem and anxiety.   90-day Treatment Plan review completed: yes    Current Stressors / Issues:  family, financial, social,relationship break up, medical, occupational    Medication Review:  No changes to current psychiatric medication(s)   Current Outpatient Prescriptions   Medication     DULoxetine (CYMBALTA) 30 MG EC capsule     albuterol (PROAIR HFA, PROVENTIL HFA, VENTOLIN HFA) 108 (90 BASE) MCG/ACT inhaler     fexofenadine (ALLEGRA) 180 MG tablet     sildenafil (VIAGRA) 100 MG tablet     gabapentin (NEURONTIN) 300 MG capsule     amitriptyline (ELAVIL) 50 MG tablet     No current facility-administered medications for this visit.          Medication Compliance:  Yes    Changes in Health Issues:   None reported    Chemical Use Review:   Substance Use: Chemical use reviewed, no active concerns identified      Tobacco Use: No current tobacco use.        ASSESSMENT: Current Emotional / Mental Status (status of significant symptoms):    Risk status (Self / Other harm or suicidal ideation)  Client denies current fears or concerns for personal safety.  Client denies current or recent suicidal ideation or behaviors.  Client denies current or recent homicidal ideation or behaviors.  Client denies current or recent self injurious behavior or ideation.  Client denies other safety concerns.  A safety and risk management plan has not been developed at this time, however client was given the after-hours number should there be a change in any of these risk factors.    Appearance:   Appropriate   Eye Contact:   Good   Psychomotor Behavior: Normal   Attitude:   Cooperative   Orientation:   All  Speech   Rate / Production: Normal    Volume:  Normal   Mood:    Anxious  Depressed    Affect:    Appropriate   Thought Content:  Clear   Thought Form:  Coherent  Logical   Insight:    Fair     Collateral Reports Completed:  Not Applicable    PLAN: (Homework, other)  Client will challenge negative thoughts when they occur and utilize positive self talk. Continue with networking groups, and connect with recruiters. Encouraged client to focus on healing himself and building up his self confidence. Encouraged client to spend some time in prayer and reading the bible and asking God to guide his path and help him to build himself back up. Encouraged client to set structure to his daily routine: connect with the [facility] about a possible scholarship while unemployed, continue running, spend increased time connecting with family and friends, and spend time job seeking in areas of passion for him.  Plan to start Early Recollections next session.  Plan to also talk with client about persistent need to be in a relationship and not be alone.  Consider reading One Thousand Gifts.             Debbie Schreiber                                                     ________________________________________________________________________    Treatment Plan    Client's Name: Antonio Ford  YOB: 1970    Date: 8/20/2014    DSM-IV Diagnoses    AXIS I: 300.02 FRANCISCO JAVIER, 300.23 Social Anxiety Disorder  Referral / Collaboration:  Collaboration was initiated with PCP MD.    Anticipated number of session or this episode of care: 75-80      MeasurableTreatment Goal(s) related to diagnosis / functional impairment(s)  Goal 1:Depression and anxiety: Client will decrease depressed mood and anxiety as evidenced by PHQ9 and GAD7 scores 4 and Under in the next 6 months. scoring 9/1/2015:  GAD7: 14. 1/10/2017: PHQ9:4, GAD7:3     I will know I've met my goal when I'm feeling less depressed and anxious, and happier in my relationship/my marriage is improving.    Objective #A (Client Action)  Status: Continued -  Date(s):5/16/2017  Client will identify 1-2 initial signs or symptoms of anxiety and utilize anxiety reduction techniques to decrease anxiety. Client will ID triggers for depression and anxiety and work towards decreasing reactivity to or eliminating stressor if possible. client will develop more effective coping skills to manage depression and anxiety symptoms, will develop healthy cognitive patterns and beliefs, will increase ability to function adaptively and will continue to take medications as prescribed / participate in supportive activities. Client will improve communication and assertiveness skills and learn to set boundaries with others.    Intervention(s)   Therapist will teach client how to ID body cues for anxiety, anxiety reduction techniques, how to ID triggers for depression and anxiety- decrease reactivity/eliminate, lifestyle changes to reduce depression and anxiety, communication skills, explore cognitive beliefs and help client to develop healthy cognitive patterns and beliefs. Grief counseling.       Client has reviewed and agreed to the above plan.      Debbie Schreiber

## 2017-05-31 ENCOUNTER — OFFICE VISIT (OUTPATIENT)
Dept: PSYCHOLOGY | Facility: CLINIC | Age: 47
End: 2017-05-31
Payer: COMMERCIAL

## 2017-05-31 DIAGNOSIS — F40.10 SOCIAL ANXIETY DISORDER: ICD-10-CM

## 2017-05-31 DIAGNOSIS — F41.1 GENERALIZED ANXIETY DISORDER: Primary | ICD-10-CM

## 2017-05-31 PROCEDURE — 90834 PSYTX W PT 45 MINUTES: CPT | Performed by: MARRIAGE & FAMILY THERAPIST

## 2017-05-31 ASSESSMENT — ANXIETY QUESTIONNAIRES
6. BECOMING EASILY ANNOYED OR IRRITABLE: MORE THAN HALF THE DAYS
5. BEING SO RESTLESS THAT IT IS HARD TO SIT STILL: NOT AT ALL
7. FEELING AFRAID AS IF SOMETHING AWFUL MIGHT HAPPEN: NOT AT ALL
GAD7 TOTAL SCORE: 8
IF YOU CHECKED OFF ANY PROBLEMS ON THIS QUESTIONNAIRE, HOW DIFFICULT HAVE THESE PROBLEMS MADE IT FOR YOU TO DO YOUR WORK, TAKE CARE OF THINGS AT HOME, OR GET ALONG WITH OTHER PEOPLE: SOMEWHAT DIFFICULT
1. FEELING NERVOUS, ANXIOUS, OR ON EDGE: MORE THAN HALF THE DAYS
3. WORRYING TOO MUCH ABOUT DIFFERENT THINGS: SEVERAL DAYS
2. NOT BEING ABLE TO STOP OR CONTROL WORRYING: MORE THAN HALF THE DAYS

## 2017-05-31 ASSESSMENT — PATIENT HEALTH QUESTIONNAIRE - PHQ9: 5. POOR APPETITE OR OVEREATING: SEVERAL DAYS

## 2017-05-31 NOTE — PROGRESS NOTES
Progress Note    Client Name: Antonoi Ford  Date: 5/31/2017       Service Type: Individual      Session Start Time: 3pm  Session End Time:3:45pm   Session Length: 45 - 50     Session #: 83     Attendees: Client attended alone      PHQ-9 / FRANCISCO JAVIER-7 Due Date:  Completed today  DATA    Treatment Objective(s) Addressed in This Session:  increase socialization  will develop more effective coping skills to manage anxiety symptoms and will challenge negative thoughts  Client will maintain boundaries with son      Progress on / Status of Treatment Objective(s) / Homework:  Satisfactory progress - ACTION (Actively working towards change); Intervened by reinforcing change plan / affirming steps taken continues to work on reducing anxiety, actively job seeking, working to set boundaries with son.     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 Discussed frustration with ex wife unwilling to adjust days if wanting to teach. He has interviewed for a workforce development position in [facility] and feels interviews were positive. Continues to date and has had 3 dates since last visit. Encouraged client to increase socialization besides dating, as well as encouraging client to join a [employer]ast[employer] singles group at his [employer]. Client reports his son's party went well. Encouraged client to discuss setting house rules and rent or son who is now an adult.  90-day Treatment Plan review completed: yes    Current Stressors / Issues:  family, financial, social,relationship break up, medical, occupational    Medication Review:  No changes to current psychiatric medication(s)   Current Outpatient Prescriptions   Medication     DULoxetine (CYMBALTA) 30 MG EC capsule     albuterol (PROAIR HFA, PROVENTIL HFA, VENTOLIN HFA) 108 (90 BASE) MCG/ACT inhaler     fexofenadine (ALLEGRA) 180 MG tablet     sildenafil (VIAGRA) 100 MG tablet     gabapentin  (NEURONTIN) 300 MG capsule     amitriptyline (ELAVIL) 50 MG tablet     No current facility-administered medications for this visit.          Medication Compliance:  Yes    Changes in Health Issues:   None reported    Chemical Use Review:   Substance Use: Chemical use reviewed, no active concerns identified      Tobacco Use: No current tobacco use.        ASSESSMENT: Current Emotional / Mental Status (status of significant symptoms):    Risk status (Self / Other harm or suicidal ideation)  Client denies current fears or concerns for personal safety.  Client denies current or recent suicidal ideation or behaviors.  Client denies current or recent homicidal ideation or behaviors.  Client denies current or recent self injurious behavior or ideation.  Client denies other safety concerns.  A safety and risk management plan has not been developed at this time, however client was given the after-hours number should there be a change in any of these risk factors.    Appearance:   Appropriate   Eye Contact:   Good   Psychomotor Behavior: Normal   Attitude:   Cooperative   Orientation:   All  Speech   Rate / Production: Normal    Volume:  Normal   Mood:    Anxious  Depressed   Affect:    Appropriate   Thought Content:  Clear   Thought Form:  Coherent  Logical   Insight:    Fair     Collateral Reports Completed:  Not Applicable    PLAN: (Homework, other)  Client will challenge negative thoughts when they occur and utilize positive self talk. Continue with networking groups, and connect with recruiters. Encouraged client to focus on healing himself and building up his self confidence. Encouraged client to spend some time in prayer and reading the bible and asking God to guide his path and help him to build himself back up. Encouraged client to set structure to his daily routine: connect with the [facility] about a possible scholarship while unemployed, continue running, spend increased time connecting with family and friends, and  spend time job seeking in areas of passion for him.            Debbie Schreiber                                                     ________________________________________________________________________    Treatment Plan    Client's Name: Antonio Ford  YOB: 1970    Date: 8/20/2014    DSM-IV Diagnoses    AXIS I: 300.02 FRANCISCO JAVIER, 300.23 Social Anxiety Disorder  Referral / Collaboration:  Collaboration was initiated with PCP MD.    Anticipated number of session or this episode of care: 75-80      MeasurableTreatment Goal(s) related to diagnosis / functional impairment(s)  Goal 1:Depression and anxiety: Client will decrease depressed mood and anxiety as evidenced by PHQ9 and GAD7 scores 4 and Under in the next 6 months. scoring 9/1/2015:  GAD7: 14. 1/10/2017: PHQ9:4, GAD7:3     I will know I've met my goal when I'm feeling less depressed and anxious, and happier in my relationship/my marriage is improving.    Objective #A (Client Action)  Status: Continued - Date(s):5/31/2017  Client will identify 1-2 initial signs or symptoms of anxiety and utilize anxiety reduction techniques to decrease anxiety. Client will ID triggers for depression and anxiety and work towards decreasing reactivity to or eliminating stressor if possible. client will develop more effective coping skills to manage depression and anxiety symptoms, will develop healthy cognitive patterns and beliefs, will increase ability to function adaptively and will continue to take medications as prescribed / participate in supportive activities. Client will improve communication and assertiveness skills and learn to set boundaries with others.    Intervention(s)   Therapist will teach client how to ID body cues for anxiety, anxiety reduction techniques, how to ID triggers for depression and anxiety- decrease reactivity/eliminate, lifestyle changes to reduce depression and anxiety, communication skills, explore cognitive beliefs and help client to  develop healthy cognitive patterns and beliefs. Grief counseling.       Client has reviewed and agreed to the above plan.      Debbie Schreiber

## 2017-05-31 NOTE — MR AVS SNAPSHOT
MRN:[id number]                      After Visit Summary   5/31/2017    Antonio Ford    MRN: [id number]           Visit Information        Provider Department      5/31/2017 3:00 PM Debbie Bro [facility] Generic      Your next 10 appointments already scheduled     Jun 22, 2017 12:00 PM CDT   Return Visit with eDbbie Hubbardlure   [facility]    [address]  [facility] [address]   [phone]            Jun 28, 2017  8:00 AM CDT   Return Visit with Debbie Hubbardlure   [facility]    [address]  [facility] [address]   [phone]            Jul 12, 2017  8:00 AM CDT   Return Visit with Debbie Bro   [facility]    [address]  [facility] [address]   [phone]              MyChart Information     [employer] gives you secure access to your electronic health record. If you see a primary care provider, you can also send messages to your care team and make appointments. If you have questions, please call your primary care clinic.  If you do not have a primary care provider, please call [phone] and they will assist you.        Care EveryWhere ID     This is your Care EveryWhere ID. This could be used by other organizations to access your [place] medical records  [id number]

## 2017-06-01 ASSESSMENT — PATIENT HEALTH QUESTIONNAIRE - PHQ9: SUM OF ALL RESPONSES TO PHQ QUESTIONS 1-9: 7

## 2017-06-01 ASSESSMENT — ANXIETY QUESTIONNAIRES: GAD7 TOTAL SCORE: 8

## 2017-06-12 ENCOUNTER — OFFICE VISIT (OUTPATIENT)
Dept: PSYCHOLOGY | Facility: CLINIC | Age: 47
End: 2017-06-12
Payer: COMMERCIAL

## 2017-06-12 DIAGNOSIS — F41.1 GENERALIZED ANXIETY DISORDER: Primary | ICD-10-CM

## 2017-06-12 PROCEDURE — 90834 PSYTX W PT 45 MINUTES: CPT | Performed by: MARRIAGE & FAMILY THERAPIST

## 2017-06-12 NOTE — MR AVS SNAPSHOT
MRN:[id number]                      After Visit Summary   6/12/2017    Antonio Ford    MRN: [id number]           Visit Information        Provider Department      6/12/2017 5:00 PM Debbie Bro [facility] Generic      Your next 10 appointments already scheduled     Jun 28, 2017  8:00 AM CDT   Return Visit with Debbie Hubbardlure   [facility] ([facility]    [address]  [facility] [address]   [phone]            Jul 12, 2017  8:00 AM CDT   Return Visit with Debbie Hubbardlure   [facility]    [address]  [facility] [address]   [phone]            Jul 26, 2017  9:00 AM CDT   Return Visit with Debbie Bro   [facility] ([facility])    [address]  [facility] [address]   [phone]              MyChart Information     [employer] gives you secure access to your electronic health record. If you see a primary care provider, you can also send messages to your care team and make appointments. If you have questions, please call your primary care clinic.  If you do not have a primary care provider, please call [phone] and they will assist you.        Care EveryWhere ID     This is your Care EveryWhere ID. This could be used by other organizations to access your [place] medical records  [id number]

## 2017-06-12 NOTE — PROGRESS NOTES
"                                             Progress Note    Client Name: Antonio Ford  Date: 6/12/2017       Service Type: Individual      Session Start Time: 5pm  Session End Time: 5:45pm   Session Length: 45 - 50     Session #: 84     Attendees: Client attended alone      PHQ-9 / FRANCISCO JAVIER-7 Due Date:  Completed last session    DATA    Treatment Objective(s) Addressed in This Session:  will develop more effective coping skills to manage anxiety symptoms and will increase ability to function adaptively  Client will maintain boundaries with son. Will seek financial support from [facility] when needed      Progress on / Status of Treatment Objective(s) / Homework:  Satisfactory progress - ACTION (Actively working towards change); Intervened by reinforcing change plan / affirming steps taken continues to work on reducing anxiety, actively job seeking, working to set boundaries with son.     Intervention:   CBT, solution focused   Client continues to look for employment and attend a job networking group when able. He has had several interviews this week. Client reports he has paired down dating sites he is involved with to 1, and has been talking with 2 women. Discussed that the one woman is rather secretive, while the other woman he is not attracted to physically but would \"Make the perfect wife.\" Encouraged client to look beyond the physical attributes. Encouraged client to consider why the other woman is being rather secretive, and whether talking with this woman is something he should be doing at this time. Discussed ongoing issues with now adult son wanting freedom to do what he wants but still live in client's home. Encouraged client to discuss setting house rules and rent for son, or ask that he find his own place to live. Encouraged client to see if he qualifies for financial assistance from the [facility] as he is not presently eligible for unemployment and finances are tight. Discussed possible use of [employer] or " [employer] as well.        90-day Treatment Plan review completed: yes    Current Stressors / Issues:  family, financial, social,medical, occupational    Medication Review:  No changes to current psychiatric medication(s)   Current Outpatient Prescriptions   Medication     DULoxetine (CYMBALTA) 30 MG EC capsule     albuterol (PROAIR HFA, PROVENTIL HFA, VENTOLIN HFA) 108 (90 BASE) MCG/ACT inhaler     fexofenadine (ALLEGRA) 180 MG tablet     sildenafil (VIAGRA) 100 MG tablet     gabapentin (NEURONTIN) 300 MG capsule     amitriptyline (ELAVIL) 50 MG tablet     No current facility-administered medications for this visit.          Medication Compliance:  Yes    Changes in Health Issues:   None reported    Chemical Use Review:   Substance Use: Chemical use reviewed, no active concerns identified      Tobacco Use: No current tobacco use.        ASSESSMENT: Current Emotional / Mental Status (status of significant symptoms):    Risk status (Self / Other harm or suicidal ideation)  Client denies current fears or concerns for personal safety.  Client denies current or recent suicidal ideation or behaviors.  Client denies current or recent homicidal ideation or behaviors.  Client denies current or recent self injurious behavior or ideation.  Client denies other safety concerns.  A safety and risk management plan has not been developed at this time, however client was given the after-hours number should there be a change in any of these risk factors.    Appearance:   Appropriate   Eye Contact:   Good   Psychomotor Behavior: Normal   Attitude:   Cooperative   Orientation:   All  Speech   Rate / Production: Normal    Volume:  Normal   Mood:    Anxious   Affect:    Appropriate   Thought Content:  Clear   Thought Form:  Coherent  Logical   Insight:    Fair     Collateral Reports Completed:  Not Applicable    PLAN: (Homework, other)  Client will challenge negative thoughts when they occur and utilize positive self talk. Continue with  networking groups, and connect with recruiters. Encouraged client to connect with the [facility] about possible financial help. Use discernment in dating.        Debbie Schreiber                                                     ________________________________________________________________________    Treatment Plan    Client's Name: Antonio Ford  YOB: 1970    Date: 8/20/2014    DSM-IV Diagnoses    AXIS I: 300.02 FRANCISCO JAVIER, 300.23 Social Anxiety Disorder  Referral / Collaboration:  Collaboration was initiated with PCP MD.    Anticipated number of session or this episode of care: 75-80      MeasurableTreatment Goal(s) related to diagnosis / functional impairment(s)  Goal 1:Depression and anxiety: Client will decrease depressed mood and anxiety as evidenced by PHQ9 and GAD7 scores 4 and Under in the next 6 months. scoring 9/1/2015:  GAD7: 14. 1/10/2017: PHQ9:4, GAD7:3     I will know I've met my goal when I'm feeling less depressed and anxious, and happier in my relationship/my marriage is improving.    Objective #A (Client Action)  Status: Continued - Date(s):6/12/2017  Client will identify 1-2 initial signs or symptoms of anxiety and utilize anxiety reduction techniques to decrease anxiety. Client will ID triggers for depression and anxiety and work towards decreasing reactivity to or eliminating stressor if possible. client will develop more effective coping skills to manage depression and anxiety symptoms, will develop healthy cognitive patterns and beliefs, will increase ability to function adaptively and will continue to take medications as prescribed / participate in supportive activities. Client will improve communication and assertiveness skills and learn to set boundaries with others.    Intervention(s)   Therapist will teach client how to ID body cues for anxiety, anxiety reduction techniques, how to ID triggers for depression and anxiety- decrease reactivity/eliminate, lifestyle changes to  reduce depression and anxiety, communication skills, explore cognitive beliefs and help client to develop healthy cognitive patterns and beliefs. Grief counseling.       Client has reviewed and agreed to the above plan.      Debbie Schreiber

## 2017-06-24 DIAGNOSIS — F41.1 GAD (GENERALIZED ANXIETY DISORDER): ICD-10-CM

## 2017-06-26 NOTE — TELEPHONE ENCOUNTER
cymbalta    Last Written Prescription Date: 12/26/16  Last Fill Quantity: 60, # refills: 4  Last Office Visit with [facility], [facility] or  Health prescribing provider: 05/11/16   Next 5 appointments (look out 90 days)     Jun 28, 2017  8:00 AM CDT   Return Visit with Debbie REYES [facility]    [address]  [facility] [address]   [phone]            Jul 12, 2017  8:00 AM CDT   Return Visit with Debbie REYES [facility] ([facility])    [address]  [facility] [address]   [phone]            Jul 26, 2017  9:00 AM CDT   Return Visit with Debbie REYES [facility] ([facility])    [address]  [facility] [address]   [phone]                   BP Readings from Last 3 Encounters:   03/07/17 138/85   05/11/16 122/76   04/20/16 124/86     Pulse: (for [facility])  Creatinine   Date Value Ref Range Status   12/27/2012 0.71 0.66 - 1.25 mg/dL Final   ]    Last PHQ-9 score on record=   PHQ-9 SCORE 5/31/2017   Total Score 7

## 2017-06-27 RX ORDER — DULOXETIN HYDROCHLORIDE 30 MG/1
CAPSULE, DELAYED RELEASE ORAL
Qty: 60 CAPSULE | Refills: 0 | Status: SHIPPED | OUTPATIENT
Start: 2017-06-27 | End: 2017-06-30

## 2017-06-27 NOTE — TELEPHONE ENCOUNTER
Medication is being filled for 1 time refill only due to:  Patient needs to be seen because it has been more than one year since last visit.     Please call and help schedule.  Pablo Reis RN, BSN

## 2017-06-28 ENCOUNTER — OFFICE VISIT (OUTPATIENT)
Dept: PSYCHOLOGY | Facility: CLINIC | Age: 47
End: 2017-06-28
Payer: COMMERCIAL

## 2017-06-28 DIAGNOSIS — F41.1 GENERALIZED ANXIETY DISORDER: Primary | ICD-10-CM

## 2017-06-28 DIAGNOSIS — F40.10 SOCIAL ANXIETY DISORDER: ICD-10-CM

## 2017-06-28 PROCEDURE — 90834 PSYTX W PT 45 MINUTES: CPT | Performed by: MARRIAGE & FAMILY THERAPIST

## 2017-06-28 NOTE — PROGRESS NOTES
"  SUBJECTIVE:                                                    Antonio Ford is a 47 year old male who presents to clinic today for the following health issues:  {Provider please address medication reconciliation discrepancies--rooming staff please delete if no med/rec issues}    HPI    Depression and Anxiety Follow-Up    Status since last visit: { :822121::\"No change\"}    Other associated symptoms:{ :906899::\"None\"}    Complicating factors:     Significant life event: { :542492::\"No\"}     Current substance abuse: { :553720::\"None\"}    PHQ-9 SCORE 1/10/2017 5/31/2017 6/30/2017   Total Score - - -   Total Score MyChart - - 3 (Minimal depression)   Total Score 4 7 3     FRANCISCO JAVIER-7 SCORE 8/26/2016 1/10/2017 5/31/2017   Total Score - - -   Total Score 3 3 8       PHQ-9  English  PHQ-9   Any Language  GAD7    Answers for HPI/ROS submitted by the patient on 6/30/2017   If you checked off any problems, how difficult have these problems made it for you to do your work, take care of things at home, or get along with other people?: Somewhat difficult  PHQ9 TOTAL SCORE: 3    Problem list and histories reviewed & adjusted, as indicated.  Additional history: {NONE - AS DOCUMENTED:920241::\"as documented\"}    {ACUTE Problem SUPERLIST - brief histories:036221}    {HIST REVIEW/ LINKS 2:102701}    {PROVIDER CHARTING PREFERENCE:383387}  "

## 2017-06-28 NOTE — PROGRESS NOTES
Progress Note    Client Name: Antonio Ford  Date: 6/28/2017       Service Type: Individual      Session Start Time: 8am  Session End Time: 8:45am   Session Length: 45 - 50     Session #: 85     Attendees: Client attended alone      PHQ-9 / FRANCISCO JAVIER-7 Due Date:  declined    DATA    Treatment Objective(s) Addressed in This Session:  will develop more effective coping skills to manage anxiety symptoms and will increase ability to function adaptively  Client will maintain boundaries with ex. Will seek financial support from [facility] when needed      Progress on / Status of Treatment Objective(s) / Homework:  Satisfactory progress - ACTION (Actively working towards change); Intervened by reinforcing change plan / affirming steps taken continues to work on reducing anxiety, actively job seeking, moving forward from old relationship  Intervention:   CBT, solution focused   Processed frustrations related to dating and unemployment. Has been dating several women off his dating site, but has not found one he is wanting to date exclusively. Is planning to met up with ex girlfriend to give back belongings. Discussed maintaining boundaries and considering dropping off belongings rather than spending time together as this may lead to perseveration over lost relationship. Client continues to apply for jobs and have interviews, as well as attending job networking group, however no jobs have panned out. Unemployment will restart on 7/1/17 which will improve his financial outlook.           90-day Treatment Plan review completed: yes    Current Stressors / Issues:  family, financial, social,medical, occupational    Medication Review:  No changes to current psychiatric medication(s)   Current Outpatient Prescriptions   Medication     DULoxetine (CYMBALTA) 30 MG EC capsule     albuterol (PROAIR HFA, PROVENTIL HFA, VENTOLIN HFA) 108 (90 BASE) MCG/ACT inhaler     fexofenadine (ALLEGRA) 180 MG tablet      sildenafil (VIAGRA) 100 MG tablet     gabapentin (NEURONTIN) 300 MG capsule     amitriptyline (ELAVIL) 50 MG tablet     No current facility-administered medications for this visit.          Medication Compliance:  Yes    Changes in Health Issues:   None reported    Chemical Use Review:   Substance Use: Chemical use reviewed, no active concerns identified      Tobacco Use: No current tobacco use.        ASSESSMENT: Current Emotional / Mental Status (status of significant symptoms):    Risk status (Self / Other harm or suicidal ideation)  Client denies current fears or concerns for personal safety.  Client denies current or recent suicidal ideation or behaviors.  Client denies current or recent homicidal ideation or behaviors.  Client denies current or recent self injurious behavior or ideation.  Client denies other safety concerns.  A safety and risk management plan has not been developed at this time, however client was given the after-hours number should there be a change in any of these risk factors.    Appearance:   Appropriate   Eye Contact:   Good   Psychomotor Behavior: Normal   Attitude:   Cooperative   Orientation:   All  Speech   Rate / Production: Normal    Volume:  Normal   Mood:    Anxious   Affect:    Appropriate   Thought Content:  Clear   Thought Form:  Coherent  Logical   Insight:    Fair     Collateral Reports Completed:  Not Applicable    PLAN: (Homework, other)  Client will challenge negative thoughts when they occur and utilize positive self talk. Continue with networking groups, and connect with recruiters. Encouraged [facility]. Use discernment in dating. Maintain boundaries with tanner.        Debbie Schreiber                                                     ________________________________________________________________________    Treatment Plan    Client's Name: Antonio Ford  YOB: 1970    Date: 8/20/2014    DSM-IV  Diagnoses    AXIS I: 300.02 FRANCISCO JAVIER, 300.23 Social Anxiety Disorder  Referral / Collaboration:  Collaboration was initiated with PCP MD.    Anticipated number of session or this episode of care: 75-80      MeasurableTreatment Goal(s) related to diagnosis / functional impairment(s)  Goal 1:Depression and anxiety: Client will decrease depressed mood and anxiety as evidenced by PHQ9 and GAD7 scores 4 and Under in the next 6 months. scoring 9/1/2015:  GAD7: 14. 1/10/2017: PHQ9:4, GAD7:3     I will know I've met my goal when I'm feeling less depressed and anxious, and happier in my relationship/my marriage is improving.    Objective #A (Client Action)  Status: Continued - Date(s):6/28/2017  Client will identify 1-2 initial signs or symptoms of anxiety and utilize anxiety reduction techniques to decrease anxiety. Client will ID triggers for depression and anxiety and work towards decreasing reactivity to or eliminating stressor if possible. client will develop more effective coping skills to manage depression and anxiety symptoms, will develop healthy cognitive patterns and beliefs, will increase ability to function adaptively and will continue to take medications as prescribed / participate in supportive activities. Client will improve communication and assertiveness skills and learn to set boundaries with others.    Intervention(s)   Therapist will teach client how to ID body cues for anxiety, anxiety reduction techniques, how to ID triggers for depression and anxiety- decrease reactivity/eliminate, lifestyle changes to reduce depression and anxiety, communication skills, explore cognitive beliefs and help client to develop healthy cognitive patterns and beliefs. Grief counseling.       Client has reviewed and agreed to the above plan.      Debbie Schreiber

## 2017-06-28 NOTE — MR AVS SNAPSHOT
MRN:[id number]                      After Visit Summary   6/28/2017    Antonio Ford    MRN: [id number]           Visit Information        Provider Department      6/28/2017 8:00 AM Debbie Bro [facility] Generic      Your next 10 appointments already scheduled     Jun 30, 2017  9:30 AM CDT   Office Visit with Josh Morales PA-C   [facility]    [address]  [facility] [address]   [phone]           Bring a current list of meds and any records pertaining to this visit.  For Physicals, please bring immunization records and any forms needing to be filled out.  Please arrive 10 minutes early to complete paperwork.            Jul 12, 2017  8:00 AM CDT   Return Visit with Debbie Bro   [facility] ([facility])    [address]  [facility] [address] [phone]            Jul 26, 2017  9:00 AM CDT   Return Visit with Debbie Bro   [facility] ([facility])    [address]  [facility] [address] [phone]              MyChart Information     [employer] gives you secure access to your electronic health record. If you see a primary care provider, you can also send messages to your care team and make appointments. If you have questions, please call your primary care clinic.  If you do not have a primary care provider, please call [phone] and they will assist you.        Care EveryWhere ID     This is your Care EveryWhere ID. This could be used by other organizations to access your [place] medical records  [id number]        Equal Access to Services     SPEEDY ESQUEDA : Tonio Moralez, ian solis, qastephanie medrano. So [facility] [phone].    ATENCIÓN: Si habla español, tiene a villasenor disposición servicios gratuitos de asistencia  lingüística. Gail al [phone].    We comply with applicable federal civil rights laws and [place] laws. We do not discriminate on the basis of race, color, national origin, age, disability sex, sexual orientation or gender identity.

## 2017-06-30 ENCOUNTER — OFFICE VISIT (OUTPATIENT)
Dept: FAMILY MEDICINE | Facility: OTHER | Age: 47
End: 2017-06-30
Payer: COMMERCIAL

## 2017-06-30 VITALS
RESPIRATION RATE: 16 BRPM | TEMPERATURE: 96.7 F | HEART RATE: 80 BPM | DIASTOLIC BLOOD PRESSURE: 80 MMHG | BODY MASS INDEX: 30.48 KG/M2 | HEIGHT: 72 IN | WEIGHT: 225 LBS | OXYGEN SATURATION: 96 % | SYSTOLIC BLOOD PRESSURE: 122 MMHG

## 2017-06-30 DIAGNOSIS — D63.8 ANEMIA OF CHRONIC DISORDER: ICD-10-CM

## 2017-06-30 DIAGNOSIS — E78.5 HYPERLIPIDEMIA LDL GOAL <160: Primary | ICD-10-CM

## 2017-06-30 DIAGNOSIS — G89.29 CHRONIC PELVIC PAIN IN MALE: ICD-10-CM

## 2017-06-30 DIAGNOSIS — Z13.6 CARDIOVASCULAR SCREENING; LDL GOAL LESS THAN 160: ICD-10-CM

## 2017-06-30 DIAGNOSIS — F41.1 GENERALIZED ANXIETY DISORDER: Primary | ICD-10-CM

## 2017-06-30 DIAGNOSIS — F40.10 SOCIAL ANXIETY DISORDER: ICD-10-CM

## 2017-06-30 DIAGNOSIS — R10.2 CHRONIC PELVIC PAIN IN MALE: ICD-10-CM

## 2017-06-30 DIAGNOSIS — Z13.1 SCREENING FOR DIABETES MELLITUS: ICD-10-CM

## 2017-06-30 LAB
ALBUMIN SERPL-MCNC: 4.1 G/DL (ref 3.4–5)
ALP SERPL-CCNC: 87 U/L (ref 40–150)
ALT SERPL W P-5'-P-CCNC: 25 U/L (ref 0–70)
ANION GAP SERPL CALCULATED.3IONS-SCNC: 8 MMOL/L (ref 3–14)
AST SERPL W P-5'-P-CCNC: 16 U/L (ref 0–45)
BILIRUB SERPL-MCNC: 0.5 MG/DL (ref 0.2–1.3)
BUN SERPL-MCNC: 20 MG/DL (ref 7–30)
CALCIUM SERPL-MCNC: 9.2 MG/DL (ref 8.5–10.1)
CHLORIDE SERPL-SCNC: 108 MMOL/L (ref 94–109)
CHOLEST SERPL-MCNC: 286 MG/DL
CO2 SERPL-SCNC: 25 MMOL/L (ref 20–32)
CREAT SERPL-MCNC: 0.94 MG/DL (ref 0.66–1.25)
ERYTHROCYTE [DISTWIDTH] IN BLOOD BY AUTOMATED COUNT: 12.9 % (ref 10–15)
GFR SERPL CREATININE-BSD FRML MDRD: 86 ML/MIN/1.7M2
GLUCOSE SERPL-MCNC: 101 MG/DL (ref 70–99)
HCT VFR BLD AUTO: 44.5 % (ref 40–53)
HDLC SERPL-MCNC: 46 MG/DL
HGB BLD-MCNC: 15 G/DL (ref 13.3–17.7)
LDLC SERPL CALC-MCNC: 201 MG/DL
MCH RBC QN AUTO: 32.5 PG (ref 26.5–33)
MCHC RBC AUTO-ENTMCNC: 33.7 G/DL (ref 31.5–36.5)
MCV RBC AUTO: 96 FL (ref 78–100)
NONHDLC SERPL-MCNC: 240 MG/DL
PLATELET # BLD AUTO: 326 10E9/L (ref 150–450)
POTASSIUM SERPL-SCNC: 4.4 MMOL/L (ref 3.4–5.3)
PROT SERPL-MCNC: 7.6 G/DL (ref 6.8–8.8)
RBC # BLD AUTO: 4.62 10E12/L (ref 4.4–5.9)
SODIUM SERPL-SCNC: 141 MMOL/L (ref 133–144)
TRIGL SERPL-MCNC: 195 MG/DL
WBC # BLD AUTO: 6.6 10E9/L (ref 4–11)

## 2017-06-30 PROCEDURE — 85027 COMPLETE CBC AUTOMATED: CPT | Performed by: PHYSICIAN ASSISTANT

## 2017-06-30 PROCEDURE — 80061 LIPID PANEL: CPT | Performed by: PHYSICIAN ASSISTANT

## 2017-06-30 PROCEDURE — 99214 OFFICE O/P EST MOD 30 MIN: CPT | Performed by: PHYSICIAN ASSISTANT

## 2017-06-30 PROCEDURE — 80053 COMPREHEN METABOLIC PANEL: CPT | Performed by: PHYSICIAN ASSISTANT

## 2017-06-30 PROCEDURE — 36415 COLL VENOUS BLD VENIPUNCTURE: CPT | Performed by: PHYSICIAN ASSISTANT

## 2017-06-30 RX ORDER — DULOXETIN HYDROCHLORIDE 30 MG/1
30 CAPSULE, DELAYED RELEASE ORAL 2 TIMES DAILY
Qty: 60 CAPSULE | Refills: 5 | Status: SHIPPED | OUTPATIENT
Start: 2017-06-30 | End: 2018-02-03

## 2017-06-30 ASSESSMENT — PAIN SCALES - GENERAL: PAINLEVEL: NO PAIN (0)

## 2017-06-30 ASSESSMENT — PATIENT HEALTH QUESTIONNAIRE - PHQ9
SUM OF ALL RESPONSES TO PHQ QUESTIONS 1-9: 3
10. IF YOU CHECKED OFF ANY PROBLEMS, HOW DIFFICULT HAVE THESE PROBLEMS MADE IT FOR YOU TO DO YOUR WORK, TAKE CARE OF THINGS AT HOME, OR GET ALONG WITH OTHER PEOPLE: SOMEWHAT DIFFICULT
SUM OF ALL RESPONSES TO PHQ QUESTIONS 1-9: 3

## 2017-06-30 NOTE — MR AVS SNAPSHOT
After Visit Summary   6/30/2017    Antonio Ford    MRN: [id number]           Patient Information     Date Of Birth          1970        Visit Information        Provider Department      6/30/2017 9:30 AM Josh Morales PA-C [facility]        Today's Diagnoses     Generalized anxiety disorder    -  1    Social anxiety disorder        Chronic pelvic pain in male        Anemia of chronic disease        Screening for diabetes mellitus        CARDIOVASCULAR SCREENING; LDL GOAL LESS THAN 160          Care Instructions    Will refill the Cymbalta for 6 months and then I would like to see you again for a recheck in 6 months.    Will order updated labs today and notify you of the results.    When you need a refill of the gabapentin and amitriptyline, let me know.           Follow-ups after your visit        Your next 10 appointments already scheduled     Jul 12, 2017  8:00 AM CDT   Return Visit with Debbie N [facility] ([facility])    [address]  [facility] [address] [phone]            Jul 26, 2017  9:00 AM CDT   Return Visit with Debbie REYES [facility] ([facility])    [address]  [facility] [address] [phone]              Who to contact     If you have questions or need follow up information about today's clinic visit or your schedule please contact [facility] directly at [phone].  Normal or non-critical lab and imaging results will be communicated to you by MyChart, letter or phone within 4 business days after the clinic has received the results. If you do not hear from us within 7 days, please contact the clinic through MyChart or phone. If you have a critical or abnormal lab result, we will notify you by phone as soon as possible.  Submit refill requests through [employer] or call your pharmacy and they will forward the refill  "request to us. Please allow 3 business days for your refill to be completed.          Additional Information About Your Visit        [employer] Information     [employer] gives you secure access to your electronic health record. If you see a primary care provider, you can also send messages to your care team and make appointments. If you have questions, please call your primary care clinic.  If you do not have a primary care provider, please call [phone] and they will assist you.        Care EveryWhere ID     This is your Care EveryWhere ID. This could be used by other organizations to access your [place] medical records  [id number]        Your Vitals Were     Pulse Temperature Respirations Height Pulse Oximetry BMI (Body Mass Index)    80 96.7  F (35.9  C) (Temporal) 16 5' 11.65\" (1.82 m) 96% 30.81 kg/m2       Blood Pressure from Last 3 Encounters:   06/30/17 122/80   03/07/17 138/85   05/11/16 122/76    Weight from Last 3 Encounters:   06/30/17 225 lb (102.1 kg)   05/11/16 217 lb (98.4 kg)   07/07/15 205 lb (93 kg)              We Performed the Following     CBC with platelets     Comprehensive metabolic panel (BMP + Alb, Alk Phos, ALT, AST, Total. Bili, TP)     Lipid Profile with reflex to direct LDL          Today's Medication Changes          These changes are accurate as of: 6/30/17 10:02 AM.  If you have any questions, ask your nurse or doctor.               These medicines have changed or have updated prescriptions.        Dose/Directions    DULoxetine 30 MG EC capsule   Commonly known as:  CYMBALTA   This may have changed:  See the new instructions.   Used for:  Generalized anxiety disorder, Social anxiety disorder   Changed by:  Josh Morales PA-C        Dose:  30 mg   Take 1 capsule (30 mg) by mouth 2 times daily   Quantity:  60 capsule   Refills:  5         Stop taking these medicines if you haven't already. Please contact your care team if you have questions.     albuterol 108 (90 BASE) " MCG/ACT Inhaler   Commonly known as:  PROAIR HFA/PROVENTIL HFA/VENTOLIN HFA   Stopped by:  Josh Morales PA-C           sildenafil 100 MG cap/tab   Commonly known as:  REVATIO/VIAGRA   Stopped by:  Josh Morales PA-C                Where to get your medicines      These medications were sent to [facility] [address] [place] - [address] [address]  [address] [facility] [address]     Phone:  [phone]     DULoxetine 30 MG EC capsule                Primary Care Provider Office Phone # Fax #    Josh Morales PA-C [phone]       [facility] [address]  [facility] [address]        Equal Access to Services     SPEEDY ESQUEDA : Hadii aad ku hadasho Soomaali, waaxda luqadaha, qaybta kaalmada adeegyada, waxdoni lemain fahad jones . So [facility] [phone].    ATENCIÓN: Si habla español, tiene a villasenor disposición servicios gratuitos de asistencia lingüística. [facility] [phone].    We comply with applicable federal civil rights laws and [place] laws. We do not discriminate on the basis of race, color, national origin, age, disability sex, sexual orientation or gender identity.            Thank you!     Thank you for choosing [facility]  for your care. Our goal is always to provide you with excellent care. Hearing back from our patients is one way we can continue to improve our services. Please take a few minutes to complete the written survey that you may receive in the mail after your visit with us. Thank you!             Your Updated Medication List - Protect others around you: Learn how to safely use, store and throw away your medicines at www.disposemymeds.org.          This list is accurate as of: 6/30/17 10:02 AM.  Always use your most recent med list.                   Brand Name Dispense Instructions for use Diagnosis    amitriptyline 50 MG tablet    ELAVIL    30 tablet    Take 1 tablet (50 mg) by mouth At  Bedtime    Penile pain       DULoxetine 30 MG EC capsule    CYMBALTA    60 capsule    Take 1 capsule (30 mg) by mouth 2 times daily    Generalized anxiety disorder, Social anxiety disorder       fexofenadine 180 MG tablet    ALLEGRA    90 tablet    Take 1 tablet (180 mg) by mouth daily    Seasonal allergic rhinitis       gabapentin 300 MG capsule    NEURONTIN    270 capsule    Take 3 capsules (900 mg) by mouth 3 times daily    Penile pain

## 2017-06-30 NOTE — PROGRESS NOTES
SUBJECTIVE:                                                    Antonio Ford is a 47 year old male who presents to clinic today for the following health issues:    HPI    Anxiety Follow-Up    Status since last visit: Improved     Other associated symptoms:None    Complicating factors:   Significant life event: Yes-  Breakup with girlfriend  Current substance abuse: None  Depression symptoms: No  FRANCISCO JAVIER-7 SCORE 8/26/2016 1/10/2017 5/31/2017   Total Score - - -   Total Score 3 3 8       GAD7    The Cymbalta seems to be working well to control his anxiety along with his sessions with Debbie Bro. He denies any significant depression or thoughts of self harm.    He continues to take amitriptyline and gabapentin daily as directed by [facility] for his chronic pelvic pain. He also does home exercises which keep his pain under control. He was told by [facility] that his PCP could take over his medications if they felt comfortable doing this.     Answers for HPI/ROS submitted by the patient on 6/30/2017   If you checked off any problems, how difficult have these problems made it for you to do your work, take care of things at home, or get along with other people?: Somewhat difficult  PHQ9 TOTAL SCORE: 3    Problem list and histories reviewed & adjusted, as indicated.  Additional history: none    ROS:  GENERAL: Denies fever, fatigue, weakness, weight gain, or weight loss.  CARDIOVASCULAR: Denies chest pain, shortness of breath, irregular heartbeats,  palpitations, or edema.  RESPIRATORY: Denies cough, hemoptysis, and shortness of breath.e.   MUSCULOSKELETAL: +Chronic pelvic pain. Denies muscle weakness.  NEUROLOGIC: Denies headache, fainting, dizziness, memory loss, numbness, tingling, or seizures.  PSYCHIATRIC: +Stable anxiety. Denies depression, mood swings, and thoughts of suicide.    OBJECTIVE:     /80 (BP Location: Right arm, Patient Position: Chair, Cuff Size: Adult Regular)  Pulse 80  Temp 96.7  F (35.9  C) (Temporal)  " Resp 16  Ht 5' 11.65\" (1.82 m)  Wt 225 lb (102.1 kg)  SpO2 96%  BMI 30.81 kg/m2  Body mass index is 30.81 kg/(m^2).  GENERAL: healthy, alert and no distress  RESP: lungs clear to auscultation - no rales, rhonchi or wheezes  CV: regular rate and rhythm, normal S1 S2, no S3 or S4, no murmur, click or rub  NEURO: Normal strength and tone, mentation intact and speech normal. Gait is stable.   PSYCH: mentation appears normal, affect normal/bright.       Results for orders placed or performed in visit on 06/30/17   CBC with platelets   Result Value Ref Range    WBC 6.6 4.0 - 11.0 10e9/L    RBC Count 4.62 4.4 - 5.9 10e12/L    Hemoglobin 15.0 13.3 - 17.7 g/dL    Hematocrit 44.5 40.0 - 53.0 %    MCV 96 78 - 100 fl    MCH 32.5 26.5 - 33.0 pg    MCHC 33.7 31.5 - 36.5 g/dL    RDW 12.9 10.0 - 15.0 %    Platelet Count 326 150 - 450 10e9/L       ASSESSMENT/PLAN:       ICD-10-CM    1. Generalized anxiety disorder F41.1 DULoxetine (CYMBALTA) 30 MG EC capsule   2. Social anxiety disorder F40.10 DULoxetine (CYMBALTA) 30 MG EC capsule   3. Chronic pelvic pain in male R10.2     G89.29    4. Anemia of chronic disease D63.8 Comprehensive metabolic panel (BMP + Alb, Alk Phos, ALT, AST, Total. Bili, TP)     CBC with platelets   5. Screening for diabetes mellitus Z13.1 Comprehensive metabolic panel (BMP + Alb, Alk Phos, ALT, AST, Total. Bili, TP)   6. CARDIOVASCULAR SCREENING; LDL GOAL LESS THAN 160 Z13.6 Lipid Profile with reflex to direct LDL         1-2. Symptoms are stable so will continue with current dose of Cymbalta. He will continue to follow with Debbie which has been going very well.   Follow up in 6 months for recheck.    3. I told him that I am comfortable taking over his amitriptyline and gabapentin prescriptions for his chronic pelvic pain so when he needs a refill, he will let me know.    4. History of unspecified anemia but has not had CBC drawn since 2012 so this was ordered today and is normal.     5. Fasting CMP " ordered.    6. Fasting lipid panel ordered.       Josh Morales PA-C  [facility]

## 2017-06-30 NOTE — PATIENT INSTRUCTIONS
Will refill the Cymbalta for 6 months and then I would like to see you again for a recheck in 6 months.    Will order updated labs today and notify you of the results.    When you need a refill of the gabapentin and amitriptyline, let me know.

## 2017-06-30 NOTE — NURSING NOTE
"Chief Complaint   Patient presents with     Anxiety     Panel Management     FRANCISCO JAVIER       Initial /80 (BP Location: Right arm, Patient Position: Chair, Cuff Size: Adult Regular)  Pulse 80  Temp 96.7  F (35.9  C) (Temporal)  Resp 16  Ht 5' 11.65\" (1.82 m)  Wt 225 lb (102.1 kg)  SpO2 96%  BMI 30.81 kg/m2 Estimated body mass index is 30.81 kg/(m^2) as calculated from the following:    Height as of this encounter: 5' 11.65\" (1.82 m).    Weight as of this encounter: 225 lb (102.1 kg).  Medication Reconciliation: complete     Tammy Marin CMA        "

## 2017-07-01 ASSESSMENT — PATIENT HEALTH QUESTIONNAIRE - PHQ9: SUM OF ALL RESPONSES TO PHQ QUESTIONS 1-9: 3

## 2017-07-12 ENCOUNTER — OFFICE VISIT (OUTPATIENT)
Dept: PSYCHOLOGY | Facility: CLINIC | Age: 47
End: 2017-07-12
Payer: COMMERCIAL

## 2017-07-12 DIAGNOSIS — F41.1 GENERALIZED ANXIETY DISORDER: Primary | ICD-10-CM

## 2017-07-12 DIAGNOSIS — F40.10 SOCIAL ANXIETY DISORDER: ICD-10-CM

## 2017-07-12 PROCEDURE — 90834 PSYTX W PT 45 MINUTES: CPT | Performed by: MARRIAGE & FAMILY THERAPIST

## 2017-07-12 NOTE — MR AVS SNAPSHOT
MRN:[id number]                      After Visit Summary   7/12/2017    Antonio Ford    MRN: [id number]           Visit Information        Provider Department      7/12/2017 8:00 AM Debbie Bro [facility] Generic      Your next 10 appointments already scheduled     Jul 26, 2017  9:00 AM CDT   Return Visit with Debbie ERIC HubbardBro   [facility] ([facility])    [address]  [facility] [address]   [phone]            Aug 09, 2017  8:00 AM CDT   Return Visit with Debbie Bro   [facility] ([facility])    [address]  [facility] [address]   [phone]              MyChart Information     [employer] gives you secure access to your electronic health record. If you see a primary care provider, you can also send messages to your care team and make appointments. If you have questions, please call your primary care clinic.  If you do not have a primary care provider, please call [phone] and they will assist you.        Care EveryWhere ID     This is your Care EveryWhere ID. This could be used by other organizations to access your [place] medical records  [id number]        Equal Access to Services     SPEEDY ESQUEDA : Tonio Moralez, waaxda luqadaha, qaybta kaalmada adeestebanyada, stephanie goodman. So [facility] [phone].    ATENCIÓN: Si habla español, tiene a villasenor disposición servicios gratuitos de asistencia lingüística. Llmanasa al [phone].    We comply with applicable federal civil rights laws and [place] laws. We do not discriminate on the basis of race, color, national origin, age, disability sex, sexual orientation or gender identity.

## 2017-07-12 NOTE — PROGRESS NOTES
Progress Note    Client Name: Antonio Ford  Date: 7/12/2017       Service Type: Individual      Session Start Time: 8am  Session End Time: 8:45am   Session Length: 45 - 50     Session #: 86     Attendees: Client attended alone      PHQ-9 / FRANCISCO JAVIER-7 Due Date:  Completed PHQ9 with PCP MD 6/30/17, declined GAD7    DATA    Treatment Objective(s) Addressed in This Session:  will experience a reduction in anxiety  Client will share thoughts with others      Progress on / Status of Treatment Objective(s) / Homework:  Satisfactory progress - ACTION (Actively working towards change); Intervened by reinforcing change plan / affirming steps taken continues to work on reducing anxiety, actively job seeking, moving forward from old relationship  Intervention:   CBT, solution focused   Client continues to job seek, and had an interview for a position at [employer] he feels hopeful about. He has not been back to job networking group in about a month. Encouraged client to attend weekly if possible. Unemployment restarts this upcoming week. Client reports continued positive relationships with children. Client's children will be gone with their mother for the next 1.5 wks for a family vacation and ex wife's wedding. Client is feeling positive about ex remarrying. Client continues to date on several dating sites. He has been spending increased time with a woman who he thinks is a good match for him in every way but appearance. States he is unsure if he is attracted to her or could become attracted to her over time. Encouraged client to create a pros/cons list for potential relationship and consider what he would like to do. If not wanting to date this woman, be up front about it. Consider attending a singles group with his Taoism at another site, as his site's group fell through.          90-day Treatment Plan review completed: yes    Current Stressors / Issues:  family, financial,  social,medical, occupational    Medication Review:  No changes to current psychiatric medication(s)   Current Outpatient Prescriptions   Medication     DULoxetine (CYMBALTA) 30 MG EC capsule     fexofenadine (ALLEGRA) 180 MG tablet     gabapentin (NEURONTIN) 300 MG capsule     amitriptyline (ELAVIL) 50 MG tablet     No current facility-administered medications for this visit.          Medication Compliance:  Yes    Changes in Health Issues:   None reported    Chemical Use Review:   Substance Use: Chemical use reviewed, no active concerns identified      Tobacco Use: No current tobacco use.        ASSESSMENT: Current Emotional / Mental Status (status of significant symptoms):    Risk status (Self / Other harm or suicidal ideation)  Client denies current fears or concerns for personal safety.  Client denies current or recent suicidal ideation or behaviors.  Client denies current or recent homicidal ideation or behaviors.  Client denies current or recent self injurious behavior or ideation.  Client denies other safety concerns.  A safety and risk management plan has not been developed at this time, however client was given the after-hours number should there be a change in any of these risk factors.    Appearance:   Appropriate   Eye Contact:   Good   Psychomotor Behavior: Normal   Attitude:   Cooperative   Orientation:   All  Speech   Rate / Production: Normal    Volume:  Normal   Mood:    Anxious   Affect:    Appropriate   Thought Content:  Clear   Thought Form:  Coherent  Logical   Insight:    Fair     Collateral Reports Completed:  Not Applicable    PLAN: (Homework, other)  Client will share thoughts on dating with woman he has been seeing. Continue with networking groups, and connect with recruiters. Consider attending a singles group.      Debbie Schreiber                                                     ________________________________________________________________________    Treatment Plan    Client's  Name: Antonio Ford  YOB: 1970    Date: 8/20/2014    DSM-IV Diagnoses    AXIS I: 300.02 FRANCISCO JAVIER, 300.23 Social Anxiety Disorder  Referral / Collaboration:  Collaboration was initiated with PCP MD.    Anticipated number of session or this episode of care: 75-80      MeasurableTreatment Goal(s) related to diagnosis / functional impairment(s)  Goal 1:Depression and anxiety: Client will decrease depressed mood and anxiety as evidenced by PHQ9 and GAD7 scores 4 and Under in the next 6 months. scoring 9/1/2015:  GAD7: 14. 1/10/2017: PHQ9:4, GAD7:3     I will know I've met my goal when I'm feeling less depressed and anxious, and happier in my relationship/my marriage is improving.    Objective #A (Client Action)  Status: Continued - Date(s):7/12/2017  Client will identify 1-2 initial signs or symptoms of anxiety and utilize anxiety reduction techniques to decrease anxiety. Client will ID triggers for depression and anxiety and work towards decreasing reactivity to or eliminating stressor if possible. client will develop more effective coping skills to manage depression and anxiety symptoms, will develop healthy cognitive patterns and beliefs, will increase ability to function adaptively and will continue to take medications as prescribed / participate in supportive activities. Client will improve communication and assertiveness skills and learn to set boundaries with others.    Intervention(s)   Therapist will teach client how to ID body cues for anxiety, anxiety reduction techniques, how to ID triggers for depression and anxiety- decrease reactivity/eliminate, lifestyle changes to reduce depression and anxiety, communication skills, explore cognitive beliefs and help client to develop healthy cognitive patterns and beliefs. Grief counseling.       Client has reviewed and agreed to the above plan.      Debbie WILCOX  Schreiber

## 2017-07-26 ENCOUNTER — OFFICE VISIT (OUTPATIENT)
Dept: PSYCHOLOGY | Facility: CLINIC | Age: 47
End: 2017-07-26
Payer: COMMERCIAL

## 2017-07-26 DIAGNOSIS — F40.10 SOCIAL ANXIETY DISORDER: ICD-10-CM

## 2017-07-26 DIAGNOSIS — F41.1 GENERALIZED ANXIETY DISORDER: Primary | ICD-10-CM

## 2017-07-26 PROCEDURE — 90834 PSYTX W PT 45 MINUTES: CPT | Performed by: MARRIAGE & FAMILY THERAPIST

## 2017-07-26 NOTE — MR AVS SNAPSHOT
MRN:[id number]                      After Visit Summary   7/26/2017    Antonio Ford    MRN: [id number]           Visit Information        Provider Department      7/26/2017 9:00 AM Debbie Bro [facility] Generic      Your next 10 appointments already scheduled     Aug 09, 2017  8:00 AM CDT   Return Visit with Debbie ERIC HubbardBro   [facility] ([facility])    [address]  [facility] [address] [phone]            Aug 22, 2017  9:00 AM CDT   Return Visit with Debbie Bro   [facility] ([facility])    [address]  [facility] [address] [phone]              MyChart Information     [employer] gives you secure access to your electronic health record. If you see a primary care provider, you can also send messages to your care team and make appointments. If you have questions, please call your primary care clinic.  If you do not have a primary care provider, please call [phone] and they will assist you.        Care EveryWhere ID     This is your Care EveryWhere ID. This could be used by other organizations to access your [place] medical records  [id number]        Equal Access to Services     SPEEDY ESQUEDA : Tonio Moralez, waaxda luqadaha, qaybta kaalmada adeestebanyada, stephanie goodman. So [facility] [phone].    ATENCIÓN: Si habla español, tiene a villasenor disposición servicios gratuitos de asistencia lingüística. Llmanasa al [phone].    We comply with applicable federal civil rights laws and [place] laws. We do not discriminate on the basis of race, color, national origin, age, disability sex, sexual orientation or gender identity.

## 2017-07-26 NOTE — PROGRESS NOTES
Progress Note    Client Name: Antonio Ford  Date: 7/26/2017       Service Type: Individual      Session Start Time: 9am  Session End Time: 9:45am   Session Length: 45 - 50     Session #: 87     Attendees: Client attended alone      PHQ-9 / FRANCISCO JAVIER-7 Due Date:   declined     DATA    Treatment Objective(s) Addressed in This Session:  ID stressors contributing to anxiety, and consider pros/cons of present choice      Progress on / Status of Treatment Objective(s) / Homework:  Satisfactory progress - ACTION (Actively working towards change); Intervened by reinforcing change plan / affirming steps taken continues to work on reducing anxiety, actively job seeking, moving forward from old relationship  Intervention:   CBT, solution focused   Client reports ex wife remarried this past weekend and has his children this whole week while they are on their honeymoon. Client reports difficulty balancing wanting to spend time with children and needing to look for jobs. Brainstormed some activites he could so with his children and still have times to seek employment. Client reports he has had 2 job interviews that may lead to an offer in the upcoming week or so which he is feeling positive about. Discussed at length difficulties with a woman he has been dating him confronting him about not exclusively dating her. Client struggles with this woman being overweight and worries about what other people would think of him if he dated her. However client also reports that she is a perfect match for him other than the weight issues. Processed some pros/cons for working on relationship vs dating other people and potentially losing the friendship. Encouraged client to process through his pros/cons and bring to next session.          90-day Treatment Plan review completed: yes    Current Stressors / Issues:  family, financial, social,medical, occupational    Medication Review:  No changes to current  psychiatric medication(s)   Current Outpatient Prescriptions   Medication     DULoxetine (CYMBALTA) 30 MG EC capsule     fexofenadine (ALLEGRA) 180 MG tablet     gabapentin (NEURONTIN) 300 MG capsule     amitriptyline (ELAVIL) 50 MG tablet     No current facility-administered medications for this visit.          Medication Compliance:  Yes    Changes in Health Issues:   None reported    Chemical Use Review:   Substance Use: Chemical use reviewed, no active concerns identified      Tobacco Use: No current tobacco use.        ASSESSMENT: Current Emotional / Mental Status (status of significant symptoms):    Risk status (Self / Other harm or suicidal ideation)  Client denies current fears or concerns for personal safety.  Client denies current or recent suicidal ideation or behaviors.  Client denies current or recent homicidal ideation or behaviors.  Client denies current or recent self injurious behavior or ideation.  Client denies other safety concerns.  A safety and risk management plan has not been developed at this time, however client was given the after-hours number should there be a change in any of these risk factors.    Appearance:   Appropriate   Eye Contact:   Good   Psychomotor Behavior: Normal   Attitude:   Cooperative   Orientation:   All  Speech   Rate / Production: Normal    Volume:  Normal   Mood:    Anxious   Affect:    Appropriate   Thought Content:  Clear   Thought Form:  Coherent  Logical   Insight:    Fair     Collateral Reports Completed:  Not Applicable    PLAN: (Homework, other)  Client will consider pros/cons of dating relationship. Client will share thoughts on dating with woman he has been seeing. Continue with networking groups, and connect with recruiters.     Debbie Schreiber                                                     ________________________________________________________________________    Treatment Plan    Client's Name: Antonio Ford  YOB: 1970    Date:  8/20/2014    DSM-IV Diagnoses    AXIS I: 300.02 FRANCISCO JAVIER, 300.23 Social Anxiety Disorder  Referral / Collaboration:  Collaboration was initiated with PCP MD.    Anticipated number of session or this episode of care: 75-80      MeasurableTreatment Goal(s) related to diagnosis / functional impairment(s)  Goal 1:Depression and anxiety: Client will decrease depressed mood and anxiety as evidenced by PHQ9 and GAD7 scores 4 and Under in the next 6 months. scoring 9/1/2015:  GAD7: 14. 1/10/2017: PHQ9:4, GAD7:3     I will know I've met my goal when I'm feeling less depressed and anxious, and happier in my relationship/my marriage is improving.    Objective #A (Client Action)  Status: Continued - Date(s):7/26/2017  Client will identify 1-2 initial signs or symptoms of anxiety and utilize anxiety reduction techniques to decrease anxiety. Client will ID triggers for depression and anxiety and work towards decreasing reactivity to or eliminating stressor if possible. client will develop more effective coping skills to manage depression and anxiety symptoms, will develop healthy cognitive patterns and beliefs, will increase ability to function adaptively and will continue to take medications as prescribed / participate in supportive activities. Client will improve communication and assertiveness skills and learn to set boundaries with others.    Intervention(s)   Therapist will teach client how to ID body cues for anxiety, anxiety reduction techniques, how to ID triggers for depression and anxiety- decrease reactivity/eliminate, lifestyle changes to reduce depression and anxiety, communication skills, explore cognitive beliefs and help client to develop healthy cognitive patterns and beliefs. Grief counseling.       Client has reviewed and agreed to the above plan.      Debbie Schreiber

## 2017-08-09 ENCOUNTER — OFFICE VISIT (OUTPATIENT)
Dept: PSYCHOLOGY | Facility: CLINIC | Age: 47
End: 2017-08-09
Payer: COMMERCIAL

## 2017-08-09 DIAGNOSIS — F41.1 GENERALIZED ANXIETY DISORDER: Primary | ICD-10-CM

## 2017-08-09 DIAGNOSIS — F40.10 SOCIAL ANXIETY DISORDER: ICD-10-CM

## 2017-08-09 PROCEDURE — 90834 PSYTX W PT 45 MINUTES: CPT | Performed by: MARRIAGE & FAMILY THERAPIST

## 2017-08-09 NOTE — MR AVS SNAPSHOT
MRN:[id number]                      After Visit Summary   8/9/2017    Antonio Ford    MRN: [id number]           Visit Information        Provider Department      8/9/2017 8:00 AM Debbie Bro [facility] Generic      Your next 10 appointments already scheduled     Aug 22, 2017  9:00 AM CDT   Return Visit with Debbie ERIC HubbardBro   [facility] ([facility])    [address]  [place] [address] [phone]            Sep 06, 2017  8:00 AM CDT   Return Visit with Debbie Bro   [facility] ([facility])    [address]  [place] [address]   [phone]              MyChart Information     [facility] gives you secure access to your electronic health record. If you see a primary care provider, you can also send messages to your care team and make appointments. If you have questions, please call your primary care clinic.  If you do not have a primary care provider, please call [phone] and they will assist you.        Care EveryWhere ID     This is your Care EveryWhere ID. This could be used by other organizations to access your [facility] medical records  [id number]        Equal Access to Services     SPEEDY ESQUEDA : Tonio Moralez, waaxda luqadaha, qaybta kaalmada adeestebanyada, stephanie goodman. So [facility] [phone].    ATENCIÓN: Si habla español, tiene a villasenor disposición servicios gratuitos de asistencia lingüística. Llmanasa al [phone].    We comply with applicable federal civil rights laws and [place] laws. We do not discriminate on the basis of race, color, national origin, age, disability sex, sexual orientation or gender identity.

## 2017-08-09 NOTE — PROGRESS NOTES
Progress Note    Client Name: Antonio Ford  Date: 8/9/2017       Service Type: Individual      Session Start Time: 8am  Session End Time: 8:45am   Session Length: 45 - 50     Session #: 88     Attendees: Client attended alone      PHQ-9 / FRANCISCO JAVIER-7 Due Date:   declined     DATA    Treatment Objective(s) Addressed in This Session:  ID stressors contributing to anxiety, and consider pros/cons of present choice  Take steps to reduce overall stress    Progress on / Status of Treatment Objective(s) / Homework:  Satisfactory progress - ACTION (Actively working towards change); Intervened by reinforcing change plan / affirming steps taken continues to work on reducing anxiety, actively job seeking  Intervention:   CBT, solution focused   Client reports ex wife is wanting him to micro manage son's homework and schooling this fall and client is hesitant to do so. Discussed incentivizing son's housing at his home in exchange for attending school and getting good grades, rather than feeling he needs to over see constantly. Reports son is doing really well at his present job, but worries he has shifted away from the Mu-ism and his spirituality. Processed client's anxiety related to this change, and encouraged client to talk to son about talking a comparative world religions class to support his exploration and facilitate discussion. Client reports he has set the boundary to remain friends with Myesha rather than dating, and has considered dating a woman from his Mu-ism he is interested in. Client reports today he has a second interview with a company that is interested in him. Still waiting to hear back on several other positions he has interviewed for. Client reports finances are tight and is worried about how he is going to afford upcoming school shopping. Client has not followed through with talking to the county about some food, energy, and financial assistance. Encouraged client to  contact the [facility].       90-day Treatment Plan review completed: yes    Current Stressors / Issues:  family, financial, social,medical, occupational    Medication Review:  No changes to current psychiatric medication(s)   Current Outpatient Prescriptions   Medication     DULoxetine (CYMBALTA) 30 MG EC capsule     fexofenadine (ALLEGRA) 180 MG tablet     gabapentin (NEURONTIN) 300 MG capsule     amitriptyline (ELAVIL) 50 MG tablet     No current facility-administered medications for this visit.          Medication Compliance:  Yes    Changes in Health Issues:   None reported    Chemical Use Review:   Substance Use: Chemical use reviewed, no active concerns identified      Tobacco Use: No current tobacco use.        ASSESSMENT: Current Emotional / Mental Status (status of significant symptoms):    Risk status (Self / Other harm or suicidal ideation)  Client denies current fears or concerns for personal safety.  Client denies current or recent suicidal ideation or behaviors.  Client denies current or recent homicidal ideation or behaviors.  Client denies current or recent self injurious behavior or ideation.  Client denies other safety concerns.  A safety and risk management plan has not been developed at this time, however client was given the after-hours number should there be a change in any of these risk factors.    Appearance:   Appropriate   Eye Contact:   Good   Psychomotor Behavior: Normal   Attitude:   Cooperative   Orientation:   All  Speech   Rate / Production: Normal    Volume:  Normal   Mood:    Anxious   Affect:    Appropriate   Thought Content:  Clear   Thought Form:  Coherent  Logical   Insight:    Fair     Collateral Reports Completed:  Not Applicable    PLAN: (Homework, other)  Client will consider connecting with the [facility] to discuss financial assistance. Continue with networking groups, and connect with recruiters. Client will consider incentivizing son attending school or charge rent.     Debbie  JERI Schreiber                                                     ________________________________________________________________________    Treatment Plan    Client's Name: Antonio Ford  YOB: 1970    Date: 8/20/2014    DSM-IV Diagnoses    AXIS I: 300.02 FRANCISCO JAVIER, 300.23 Social Anxiety Disorder  Referral / Collaboration:  Collaboration was initiated with PCP MD.    Anticipated number of session or this episode of care: 75-80      MeasurableTreatment Goal(s) related to diagnosis / functional impairment(s)  Goal 1:Depression and anxiety: Client will decrease depressed mood and anxiety as evidenced by PHQ9 and GAD7 scores 4 and Under in the next 6 months. scoring 9/1/2015:  GAD7: 14. 1/10/2017: PHQ9:4, GAD7:3, 5/31/17:PHQ9:7, GAD7:8     I will know I've met my goal when I'm feeling less depressed and anxious, and happier in my relationship/my marriage is improving.    Objective #A (Client Action)  Status: Continued - Date(s):8/9/2017  Client will identify 1-2 initial signs or symptoms of anxiety and utilize anxiety reduction techniques to decrease anxiety. Client will ID triggers for depression and anxiety and work towards decreasing reactivity to or eliminating stressor if possible. client will develop more effective coping skills to manage depression and anxiety symptoms, will develop healthy cognitive patterns and beliefs, will increase ability to function adaptively and will continue to take medications as prescribed / participate in supportive activities. Client will improve communication and assertiveness skills and learn to set boundaries with others.    Intervention(s)   Therapist will teach client how to ID body cues for anxiety, anxiety reduction techniques, how to ID triggers for depression and anxiety- decrease reactivity/eliminate, lifestyle changes to reduce depression and anxiety, communication skills, explore cognitive beliefs and help client to develop healthy cognitive patterns and beliefs.  Grief counseling.       Client has reviewed and agreed to the above plan.      Debbie Schreiber

## 2017-08-22 ENCOUNTER — OFFICE VISIT (OUTPATIENT)
Dept: PSYCHOLOGY | Facility: CLINIC | Age: 47
End: 2017-08-22
Payer: COMMERCIAL

## 2017-08-22 DIAGNOSIS — F40.10 SOCIAL ANXIETY DISORDER: ICD-10-CM

## 2017-08-22 DIAGNOSIS — F41.1 GENERALIZED ANXIETY DISORDER: Primary | ICD-10-CM

## 2017-08-22 PROCEDURE — 90834 PSYTX W PT 45 MINUTES: CPT | Performed by: MARRIAGE & FAMILY THERAPIST

## 2017-08-22 NOTE — MR AVS SNAPSHOT
MRN:[id number]                      After Visit Summary   8/22/2017    Antonio Ford    MRN: [id number]           Visit Information        Provider Department      8/22/2017 9:00 AM Debbie Bro [facility] Generic      Your next 10 appointments already scheduled     Sep 06, 2017  8:00 AM CDT   Return Visit with Debbie ERIC HubbardBro   [facility] ([facility])    [address]  [place] [address] [phone]            Sep 19, 2017  8:00 AM CDT   Return Visit with Debbie Bro   [facility] ([facility])    [address]  [place] [address] [phone]              MyChart Information     [facility] gives you secure access to your electronic health record. If you see a primary care provider, you can also send messages to your care team and make appointments. If you have questions, please call your primary care clinic.  If you do not have a primary care provider, please call [phone] and they will assist you.        Care EveryWhere ID     This is your Care EveryWhere ID. This could be used by other organizations to access your [facility] medical records  [id number]        Equal Access to Services     SPEEDY ESQUEDA : Tonio Moralez, waaxda luqadaha, qaybta kaalmada adeestebanyada, stephanie goodman. So [facility] [phone].    ATENCIÓN: Si habla español, tiene a villasenor disposición servicios gratuitos de asistencia lingüística. Llmanasa al [phone].    We comply with applicable federal civil rights laws and [place] laws. We do not discriminate on the basis of race, color, national origin, age, disability sex, sexual orientation or gender identity.

## 2017-08-22 NOTE — PROGRESS NOTES
Progress Note    Client Name: Antonio oFrd  Date: 8/22/2017       Service Type: Individual      Session Start Time: 9am  Session End Time: 9:50am   Session Length: 45 - 50     Session #: 89     Attendees: Client attended alone      PHQ-9 / FRANCISCO JAVIER-7 Due Date:   declined     DATA    Treatment Objective(s) Addressed in This Session:  ID stressors contributing to anxiety, and consider pros/cons of present choice  Take steps to reduce overall stress    Progress on / Status of Treatment Objective(s) / Homework:  Satisfactory progress - ACTION (Actively working towards change); Intervened by reinforcing change plan / affirming steps taken continues to work on reducing anxiety, actively job seeking  Intervention:   CBT, solution focused   Client reports this week he has been feeling more down. Discussed present stressors: tight finances/has not applied for assistance with county out of pride, pressure from ex to help with back to school items he can't afford, dating, lack of employment. Discussed that client can apply for assistance online and not have to go in person to the [facility] which may decrease anxiety associated. Client continues to reach out to staffing agencies and apply for positions. Discussed at length pros/cons of dating [place], and discuss considering dating for a trial period and discussing how each of them felt it went at the end of the timeframe.       90-day Treatment Plan review completed: yes    Current Stressors / Issues:  family, financial, social,medical, occupational    Medication Review:  No changes to current psychiatric medication(s)   Current Outpatient Prescriptions   Medication     DULoxetine (CYMBALTA) 30 MG EC capsule     fexofenadine (ALLEGRA) 180 MG tablet     gabapentin (NEURONTIN) 300 MG capsule     amitriptyline (ELAVIL) 50 MG tablet     No current facility-administered medications for this visit.          Medication Compliance:  Yes    Changes  in Health Issues:   None reported    Chemical Use Review:   Substance Use: Chemical use reviewed, no active concerns identified      Tobacco Use: No current tobacco use.        ASSESSMENT: Current Emotional / Mental Status (status of significant symptoms):    Risk status (Self / Other harm or suicidal ideation)  Client denies current fears or concerns for personal safety.  Client denies current or recent suicidal ideation or behaviors.  Client denies current or recent homicidal ideation or behaviors.  Client denies current or recent self injurious behavior or ideation.  Client denies other safety concerns.  A safety and risk management plan has not been developed at this time, however client was given the after-hours number should there be a change in any of these risk factors.    Appearance:   Appropriate   Eye Contact:   Good   Psychomotor Behavior: Normal   Attitude:   Cooperative   Orientation:   All  Speech   Rate / Production: Normal    Volume:  Normal   Mood:    Anxious   Affect:    Appropriate   Thought Content:  Clear   Thought Form:  Coherent  Logical   Insight:    Fair     Collateral Reports Completed:  Not Applicable    PLAN: (Homework, other)  Client will consider connecting with the [facility] to discuss financial assistance. Continue with networking groups, and connect with recruiters. Client will considering dating for a trial period and discussing how each of them felt it went at the end of the timeframe.     Debbie Schreiber                                                     ________________________________________________________________________    Treatment Plan    Client's Name: Antonio Ford  YOB: 1970    Date: 8/20/2014    DSM-IV Diagnoses    AXIS I: 300.02 FRANCISCO JAVIER, 300.23 Social Anxiety Disorder  Referral / Collaboration:  Collaboration was initiated with PCP MD.    Anticipated number of session or this episode of care: 75-80      MeasurableTreatment Goal(s) related to diagnosis /  functional impairment(s)  Goal 1:Depression and anxiety: Client will decrease depressed mood and anxiety as evidenced by PHQ9 and GAD7 scores 4 and Under in the next 6 months. scoring 9/1/2015:  GAD7: 14. 1/10/2017: PHQ9:4, GAD7:3, 5/31/17:PHQ9:7, GAD7:8     I will know I've met my goal when I'm feeling less depressed and anxious, and happier in my relationship/my marriage is improving.    Objective #A (Client Action)  Status: Continued - Date(s):8/22/2017  Client will identify 1-2 initial signs or symptoms of anxiety and utilize anxiety reduction techniques to decrease anxiety. Client will ID triggers for depression and anxiety and work towards decreasing reactivity to or eliminating stressor if possible. client will develop more effective coping skills to manage depression and anxiety symptoms, will develop healthy cognitive patterns and beliefs, will increase ability to function adaptively and will continue to take medications as prescribed / participate in supportive activities. Client will improve communication and assertiveness skills and learn to set boundaries with others.    Intervention(s)   Therapist will teach client how to ID body cues for anxiety, anxiety reduction techniques, how to ID triggers for depression and anxiety- decrease reactivity/eliminate, lifestyle changes to reduce depression and anxiety, communication skills, explore cognitive beliefs and help client to develop healthy cognitive patterns and beliefs. Grief counseling.       Client has reviewed and agreed to the above plan.      Debbie Schreiber

## 2017-08-23 NOTE — PROGRESS NOTES
SUBJECTIVE:                                                    Antonio Ford is a 47 year old male who presents to clinic today for the following health issues:      History of Present Illness   Depression & Anxiety Follow-up:     Depression/Anxiety:  Depression & Anxiety    Status since last visit::  Worsened    Other associated symptoms of depression and anxiety::  None    Significant life event::  YES    Current substance use::  Alcohol    Answers for HPI/ROS submitted by the patient on 8/25/2017   If you checked off any problems, how difficult have these problems made it for you to do your work, take care of things at home, or get along with other people?: Somewhat difficult  PHQ9 TOTAL SCORE: 14  FRANCISCO JAVIER 7 TOTAL SCORE: 12      He has felt more anxious and depressed. He is currently unemployed and having a difficult time finding motivation to apply for jobs and to exercise. He is also constantly worrying about things but it does not keep him awake at night. He denies any thoughts of self harm. He has been on Cymbalta, amitriptyline, and gabapentin for years. He tried Wellbutrin in the past but did not tolerate it. He states he has also tried numerous SSRIs ~10 years ago and thinks Lexapro may have been helpful.       Problem list and histories reviewed & adjusted, as indicated.  Additional history: none    ROS:  GENERAL: Denies fever, fatigue, weakness, weight gain, or weight loss.  CARDIOVASCULAR: Denies chest pain, shortness of breath, irregular heartbeats,  palpitations, or edema.  RESPIRATORY: Denies cough, hemoptysis, and shortness of breath.  NEUROLOGIC: Denies headache, fainting, dizziness, memory loss, numbness, tingling, or seizures.  PSYCHIATRIC: +Worsening depression and anxiety. Denies thoughts of suicide.    OBJECTIVE:     /84 (BP Location: Right arm, Patient Position: Chair, Cuff Size: Adult Regular)  Pulse 75  Temp 97.6  F (36.4  C) (Temporal)  Resp 16  Wt 234 lb (106.1 kg)  SpO2 95%   BMI 32.04 kg/m2  Body mass index is 32.04 kg/(m^2).  GENERAL: healthy, alert and no distress  RESP: lungs clear to auscultation - no rales, rhonchi or wheezes  CV: regular rate and rhythm, normal S1 S2, no S3 or S4, no murmur, click or rub  PSYCH: mentation appears normal, affect normal/bright    ASSESSMENT/PLAN:       ICD-10-CM    1. Generalized anxiety disorder F41.1 escitalopram (LEXAPRO) 10 MG tablet   2. Mild episode of recurrent major depressive disorder (H) F33.0 escitalopram (LEXAPRO) 10 MG tablet   3. Chronic seasonal allergic rhinitis due to pollen J30.1 fexofenadine (ALLEGRA) 180 MG tablet       Will add Lexapro to his regimen, 5 mg daily for 1 week then 10 mg daily thereafter. Common side effects discussed and he was told that this can sometimes interact with Cymbalta and lead to serotonin syndrome although this is unlikely and we are starting him on a low dose.   He was notified that this can take 4-6 weeks to take full effect.  Encouraged to try to exercise more.  Continue with counseling.   Follow up via phone or office visit in 1 month for recheck.    Greater than 50% of 25 minute visit spent on counseling and plan of care regarding his worsening anxiety and depression.      Josh Morales PA-C  [facility]

## 2017-08-25 ENCOUNTER — OFFICE VISIT (OUTPATIENT)
Dept: FAMILY MEDICINE | Facility: OTHER | Age: 47
End: 2017-08-25
Payer: COMMERCIAL

## 2017-08-25 VITALS
WEIGHT: 234 LBS | DIASTOLIC BLOOD PRESSURE: 84 MMHG | HEART RATE: 75 BPM | SYSTOLIC BLOOD PRESSURE: 124 MMHG | BODY MASS INDEX: 32.04 KG/M2 | OXYGEN SATURATION: 95 % | TEMPERATURE: 97.6 F | RESPIRATION RATE: 16 BRPM

## 2017-08-25 DIAGNOSIS — J30.1 CHRONIC SEASONAL ALLERGIC RHINITIS DUE TO POLLEN: ICD-10-CM

## 2017-08-25 DIAGNOSIS — F33.0 MILD EPISODE OF RECURRENT MAJOR DEPRESSIVE DISORDER (H): ICD-10-CM

## 2017-08-25 DIAGNOSIS — F41.1 GENERALIZED ANXIETY DISORDER: Primary | ICD-10-CM

## 2017-08-25 PROCEDURE — 99214 OFFICE O/P EST MOD 30 MIN: CPT | Performed by: PHYSICIAN ASSISTANT

## 2017-08-25 RX ORDER — FEXOFENADINE HCL 180 MG/1
180 TABLET ORAL DAILY
Qty: 90 TABLET | Refills: 1 | Status: SHIPPED | OUTPATIENT
Start: 2017-08-25 | End: 2018-09-20

## 2017-08-25 RX ORDER — ESCITALOPRAM OXALATE 10 MG/1
TABLET ORAL
Qty: 30 TABLET | Refills: 1 | Status: SHIPPED | OUTPATIENT
Start: 2017-08-25 | End: 2018-02-23

## 2017-08-25 ASSESSMENT — PATIENT HEALTH QUESTIONNAIRE - PHQ9
10. IF YOU CHECKED OFF ANY PROBLEMS, HOW DIFFICULT HAVE THESE PROBLEMS MADE IT FOR YOU TO DO YOUR WORK, TAKE CARE OF THINGS AT HOME, OR GET ALONG WITH OTHER PEOPLE: SOMEWHAT DIFFICULT
SUM OF ALL RESPONSES TO PHQ QUESTIONS 1-9: 14
SUM OF ALL RESPONSES TO PHQ QUESTIONS 1-9: 14

## 2017-08-25 ASSESSMENT — ANXIETY QUESTIONNAIRES
7. FEELING AFRAID AS IF SOMETHING AWFUL MIGHT HAPPEN: NOT AT ALL
1. FEELING NERVOUS, ANXIOUS, OR ON EDGE: NEARLY EVERY DAY
6. BECOMING EASILY ANNOYED OR IRRITABLE: MORE THAN HALF THE DAYS
7. FEELING AFRAID AS IF SOMETHING AWFUL MIGHT HAPPEN: NOT AT ALL
5. BEING SO RESTLESS THAT IT IS HARD TO SIT STILL: SEVERAL DAYS
GAD7 TOTAL SCORE: 12
4. TROUBLE RELAXING: NEARLY EVERY DAY
2. NOT BEING ABLE TO STOP OR CONTROL WORRYING: NOT AT ALL
3. WORRYING TOO MUCH ABOUT DIFFERENT THINGS: NEARLY EVERY DAY
GAD7 TOTAL SCORE: 12
GAD7 TOTAL SCORE: 12

## 2017-08-25 NOTE — MR AVS SNAPSHOT
After Visit Summary   8/25/2017    Antonio Ford    MRN: [id number]           Patient Information     Date Of Birth          1970        Visit Information        Provider Department      8/25/2017 2:00 PM Josh Morales PA-C [facility]        Today's Diagnoses     Generalized anxiety disorder    -  1    Mild episode of recurrent major depressive disorder (H)        Chronic seasonal allergic rhinitis due to pollen          Care Instructions    Will start you on a medication called Lexapro to take 1/2 tablet daily for 1 week then a full tablet daily thereafter.  If you have any unwanted side effects, let me know.  Will plan to recheck symptoms in 1 month via an office visit or phone visit.  This can take 4-6 weeks to take full effect.  Try to exercise more frequently and eat a healthy diet.    Will refill Allegra to use daily as needed.             Follow-ups after your visit        Your next 10 appointments already scheduled     Sep 06, 2017  8:00 AM CDT   [facility]    [address] [place] [id number] [phone]            Sep 19, 2017  8:00 AM CDT   Return Visit with Debbie REYES [facility] ([address] [id number] [phone]              Who to contact     If you have questions or need follow up information about today's clinic visit or your schedule please contact [facility] directly at [phone].  Normal or non-critical lab and imaging results will be communicated to you by MyChart, letter or phone within 4 business days after the clinic has received the results. If you do not hear from us within 7 days, please contact the clinic through MyChart or phone. If you have a critical or abnormal lab result, we will notify you by phone as soon as possible.  Submit refill  requests through [employer] or call your pharmacy and they will forward the refill request to us. Please allow 3 business days for your refill to be completed.          Additional Information About Your Visit        [email or web address] Information     [employer] gives you secure access to your electronic health record. If you see a primary care provider, you can also send messages to your care team and make appointments. If you have questions, please call your primary care clinic.  If you do not have a primary care provider, please call [phone] and they will assist you.        Care EveryWhere ID     This is your Care EveryWhere ID. This could be used by other organizations to access your [place] medical records  [id number]        Your Vitals Were     Pulse Temperature Respirations Pulse Oximetry BMI (Body Mass Index)       75 97.6  F (36.4  C) (Temporal) 16 95% 32.04 kg/m2        Blood Pressure from Last 3 Encounters:   08/25/17 124/84   06/30/17 122/80   03/07/17 138/85    Weight from Last 3 Encounters:   08/25/17 234 lb (106.1 kg)   06/30/17 225 lb (102.1 kg)   05/11/16 217 lb (98.4 kg)              Today, you had the following     No orders found for display         Today's Medication Changes          These changes are accurate as of: 8/25/17  2:38 PM.  If you have any questions, ask your nurse or doctor.               Start taking these medicines.        Dose/Directions    escitalopram 10 MG tablet   Commonly known as:  LEXAPRO   Used for:  Generalized anxiety disorder, Mild episode of recurrent major depressive disorder (H)   Started by:  Josh Morales PA-C        Take 1/2 tablet daily for 1 week then a full tablet daily thereafter   Quantity:  30 tablet   Refills:  1            Where to get your medicines      These medications were sent to [facility] [address] [place] [address] [address] [address]     Phone:  [phone]     escitalopram 10 MG tablet    fexofenadine  180 MG tablet                Primary Care Provider Office Phone # Fax #    Josh Modesto Morales PA-C [phone]       [address] [place] [address] [address]  [facility] [address]        Equal Access to Services     SPEEDY ESQUEDA : Hadii aad ku hadchevyo Sotiaraali, waaxda luqadaha, qaybta kaalmada adeegyada, stephanie pitain hayaamaye nicksuerosa goodman. So [facility] [phone].    ATENCIÓN: Si habla español, tiene a villasenor disposición servicios gratuitos de asistencia lingüística. Llame al [phone].    We comply with applicable federal civil rights laws and [place] laws. We do not discriminate on the basis of race, color, national origin, age, disability sex, sexual orientation or gender identity.            Thank you!     Thank you for choosing [facility]  for your care. Our goal is always to provide you with excellent care. Hearing back from our patients is one way we can continue to improve our services. Please take a few minutes to complete the written survey that you may receive in the mail after your visit with us. Thank you!             Your Updated Medication List - Protect others around you: Learn how to safely use, store and throw away your medicines at www.disposemymeds.org.          This list is accurate as of: 8/25/17  2:38 PM.  Always use your most recent med list.                   Brand Name Dispense Instructions for use Diagnosis    amitriptyline 50 MG tablet    ELAVIL    30 tablet    Take 1 tablet (50 mg) by mouth At Bedtime    Penile pain       DULoxetine 30 MG EC capsule    CYMBALTA    60 capsule    Take 1 capsule (30 mg) by mouth 2 times daily    Generalized anxiety disorder, Social anxiety disorder       escitalopram 10 MG tablet    LEXAPRO    30 tablet    Take 1/2 tablet daily for 1 week then a full tablet daily thereafter    Generalized anxiety disorder, Mild episode of recurrent major depressive disorder (H)       fexofenadine 180 MG tablet    ALLEGRA    90 tablet    Take 1 tablet (180  mg) by mouth daily    Chronic seasonal allergic rhinitis due to pollen       gabapentin 300 MG capsule    NEURONTIN    270 capsule    Take 3 capsules (900 mg) by mouth 3 times daily    Penile pain

## 2017-08-25 NOTE — NURSING NOTE
"Chief Complaint   Patient presents with     Recheck Medication     Anxiety     Panel Management     GAD7       Initial /84 (BP Location: Right arm, Patient Position: Chair, Cuff Size: Adult Regular)  Pulse 75  Temp 97.6  F (36.4  C) (Temporal)  Resp 16  Wt 234 lb (106.1 kg)  SpO2 95%  BMI 32.04 kg/m2 Estimated body mass index is 32.04 kg/(m^2) as calculated from the following:    Height as of 6/30/17: 5' 11.65\" (1.82 m).    Weight as of this encounter: 234 lb (106.1 kg).  Medication Reconciliation: complete     Tammy Marin CMA      "

## 2017-08-25 NOTE — PATIENT INSTRUCTIONS
Will start you on a medication called Lexapro to take 1/2 tablet daily for 1 week then a full tablet daily thereafter.  If you have any unwanted side effects, let me know.  Will plan to recheck symptoms in 1 month via an office visit or phone visit.  This can take 4-6 weeks to take full effect.  Try to exercise more frequently and eat a healthy diet.    Will refill Allegra to use daily as needed.

## 2017-08-26 ASSESSMENT — ANXIETY QUESTIONNAIRES: GAD7 TOTAL SCORE: 12

## 2017-08-26 ASSESSMENT — PATIENT HEALTH QUESTIONNAIRE - PHQ9: SUM OF ALL RESPONSES TO PHQ QUESTIONS 1-9: 14

## 2017-09-05 ENCOUNTER — E-VISIT (OUTPATIENT)
Dept: FAMILY MEDICINE | Facility: OTHER | Age: 47
End: 2017-09-05
Payer: COMMERCIAL

## 2017-09-05 ENCOUNTER — MYC MEDICAL ADVICE (OUTPATIENT)
Dept: FAMILY MEDICINE | Facility: OTHER | Age: 47
End: 2017-09-05

## 2017-09-05 DIAGNOSIS — F40.10 SOCIAL ANXIETY DISORDER: ICD-10-CM

## 2017-09-05 DIAGNOSIS — F33.0 MILD EPISODE OF RECURRENT MAJOR DEPRESSIVE DISORDER (H): Primary | ICD-10-CM

## 2017-09-05 PROCEDURE — 98969 ZZC NONPHYSICIAN ONLINE ASSESSMENT AND MANAGEMENT: CPT | Performed by: PHYSICIAN ASSISTANT

## 2017-09-06 ENCOUNTER — OFFICE VISIT (OUTPATIENT)
Dept: PSYCHOLOGY | Facility: CLINIC | Age: 47
End: 2017-09-06
Payer: COMMERCIAL

## 2017-09-06 DIAGNOSIS — F40.10 SOCIAL ANXIETY DISORDER: ICD-10-CM

## 2017-09-06 DIAGNOSIS — F41.1 GENERALIZED ANXIETY DISORDER: ICD-10-CM

## 2017-09-06 DIAGNOSIS — F33.0 MILD EPISODE OF RECURRENT MAJOR DEPRESSIVE DISORDER (H): Primary | ICD-10-CM

## 2017-09-06 PROCEDURE — 90834 PSYTX W PT 45 MINUTES: CPT | Performed by: MARRIAGE & FAMILY THERAPIST

## 2017-09-06 NOTE — PROGRESS NOTES
Progress Note    Client Name: Antonio Ford  Date: 9/6/2017       Service Type: Individual      Session Start Time: 8am  Session End Time: 8:45am   Session Length: 45 - 50     Session #: 90     Attendees: Client attended alone      PHQ-9 / FRANCISCO JAVIER-7 Due Date:   Completed 8/25/17    DATA    Treatment Objective(s) Addressed in This Session:  will experience a reduction in depressed mood and will continue to take medications as prescribed / participate in supportive activities and services   ID stressors contributing to anxiety, and consider pros/cons of present choice  Take steps to reduce overall stress    Progress on / Status of Treatment Objective(s) / Homework:  Satisfactory progress - ACTION (Actively working towards change); Intervened by reinforcing change plan / affirming steps taken continues to work on reducing anxiety, actively job seeking  Intervention:   CBT, solution focused   Client reports this week he has been feeling low energy/had a lack of motivation, staying in bed more frequently, down, and anxious since starting lexapro. He reports some itching since starting the medication. Encouraged client to follow up with PCP MD and discuss other options. Discussed looking into getting vitamin D levels checked as he has been low in the past, as well as discussing supplementation with B and D vitamins under PCP MD's advisement. Discussed that client is still unemployed and continues to have interviews. He feels hopeful about a potential contract position at [employer]. Encouraged client to make it a priority to weekly connect with job networking group he had been attending in the past. Encouraged client to add exercise back into his daily routine. Discussed frustrations with dating and encouraged client to consider taking a month to take care of himself rather than focusing on dating.         90-day Treatment Plan review completed: yes    Current Stressors /  Issues:  family, financial, social,medical, occupational    Medication Review:  No changes to current psychiatric medication(s)   Current Outpatient Prescriptions   Medication     escitalopram (LEXAPRO) 10 MG tablet     fexofenadine (ALLEGRA) 180 MG tablet     DULoxetine (CYMBALTA) 30 MG EC capsule     gabapentin (NEURONTIN) 300 MG capsule     amitriptyline (ELAVIL) 50 MG tablet     No current facility-administered medications for this visit.          Medication Compliance:  Yes    Changes in Health Issues:   None reported    Chemical Use Review:   Substance Use: Chemical use reviewed, no active concerns identified      Tobacco Use: No current tobacco use.        ASSESSMENT: Current Emotional / Mental Status (status of significant symptoms):    Risk status (Self / Other harm or suicidal ideation)  Client denies current fears or concerns for personal safety.  Client denies current or recent suicidal ideation or behaviors.  Client denies current or recent homicidal ideation or behaviors.  Client denies current or recent self injurious behavior or ideation.  Client denies other safety concerns.  A safety and risk management plan has not been developed at this time, however client was given the after-hours number should there be a change in any of these risk factors.    Appearance:   Appropriate   Eye Contact:   Good   Psychomotor Behavior: Normal   Attitude:   Cooperative   Orientation:   All  Speech   Rate / Production: Normal    Volume:  Normal   Mood:    Anxious  Depressed   Affect:    Appropriate   Thought Content:  Clear   Thought Form:  Coherent  Logical   Insight:    Fair     Collateral Reports Completed:  Communicated with: PCP MD in person about client follow up related to lexapro and suggestions discussed    PLAN: (Homework, other)  Client will consider connecting with the [facility] to discuss financial assistance. Continue with networking groups, and connect with recruiters. Encouraged client to follow up with  PCP MD. Encouraged client to add exercise back into his daily routine. Encouraged client to consider taking a month to take care of himself rather than focusing on dating.       Debbie Schreiber                                                     ________________________________________________________________________    Treatment Plan    Client's Name: Antonio Ford  YOB: 1970    Date: 8/20/2014    DSM-IV Diagnoses    AXIS I: 300.02(F41.1) FRANCISCO JAVIER  300.23(F40.10) Social Anxiety Disorder   F33.0 MDD recurrent Mild  Referral / Collaboration:  Collaboration was initiated with PCP MD.    Anticipated number of session or this episode of care: 75-80      MeasurableTreatment Goal(s) related to diagnosis / functional impairment(s)  Goal 1:Depression and anxiety: Client will decrease depressed mood and anxiety as evidenced by PHQ9 and GAD7 scores 4 and Under in the next 6 months. scoring 9/1/2015:  GAD7: 14. 1/10/2017: PHQ9:4, GAD7:3, 5/31/17:PHQ9:7, GAD7:8     I will know I've met my goal when I'm feeling less depressed and anxious, and happier in my relationship/my marriage is improving.    Objective #A (Client Action)  Status: Continued - Date(s):9/6/2017  Client will identify 1-2 initial signs or symptoms of anxiety and utilize anxiety reduction techniques to decrease anxiety. Client will ID triggers for depression and anxiety and work towards decreasing reactivity to or eliminating stressor if possible. client will develop more effective coping skills to manage depression and anxiety symptoms, will develop healthy cognitive patterns and beliefs, will increase ability to function adaptively and will continue to take medications as prescribed / participate in supportive activities. Client will improve communication and assertiveness skills and learn to set boundaries with others.    Intervention(s)   Therapist will teach client how to ID body cues for anxiety, anxiety reduction techniques, how to ID triggers  for depression and anxiety- decrease reactivity/eliminate, lifestyle changes to reduce depression and anxiety, communication skills, explore cognitive beliefs and help client to develop healthy cognitive patterns and beliefs. Grief counseling.       Client has reviewed and agreed to the above plan.      Debbie Schreiber

## 2017-09-06 NOTE — MR AVS SNAPSHOT
MRN:[id number]                      After Visit Summary   9/6/2017    Antonio Ford    MRN: [id number]           Visit Information        Provider Department      9/6/2017 8:00 AM Debbie Bro [facility]      Your next 10 appointments already scheduled     Sep 19, 2017  8:00 AM CDT   Return Visit with Debbie ERIC HubbardBro   [facility]    [address]  [facility] [address] [phone]            Oct 03, 2017  8:00 AM CDT   Return Visit with Debbie Bro   [facility])    [address]  [facility] [address] [phone]              MyChart Information     [employer] gives you secure access to your electronic health record. If you see a primary care provider, you can also send messages to your care team and make appointments. If you have questions, please call your primary care clinic.  If you do not have a primary care provider, please call [phone] and they will assist you.        Care EveryWhere ID     This is your Care EveryWhere ID. This could be used by other organizations to access your [facility] medical records  [id number]        Equal Access to Services     SPEEDY ESQUEDA : Tonio Moralez, waaxda luqadaha, qaybta kaalmada ademaria fernanda, stephanie goodman. So [facility] [phone].    ATENCIÓN: Si habla español, tiene a villasenor disposición servicios gratuitos de asistencia lingüística. Llmanasa al [phone].    We comply with applicable federal civil rights laws and [place] laws. We do not discriminate on the basis of race, color, national origin, age, disability sex, sexual orientation or gender identity.

## 2017-09-22 ENCOUNTER — OFFICE VISIT (OUTPATIENT)
Dept: PSYCHOLOGY | Facility: CLINIC | Age: 47
End: 2017-09-22
Payer: COMMERCIAL

## 2017-09-22 DIAGNOSIS — F41.1 GENERALIZED ANXIETY DISORDER: Primary | ICD-10-CM

## 2017-09-22 DIAGNOSIS — F33.0 MILD EPISODE OF RECURRENT MAJOR DEPRESSIVE DISORDER (H): ICD-10-CM

## 2017-09-22 DIAGNOSIS — F40.10 SOCIAL ANXIETY DISORDER: ICD-10-CM

## 2017-09-22 PROCEDURE — 90834 PSYTX W PT 45 MINUTES: CPT | Performed by: MARRIAGE & FAMILY THERAPIST

## 2017-09-22 NOTE — MR AVS SNAPSHOT
MRN:[id number]                      After Visit Summary   9/22/2017    Antonio Ford    MRN: [id number]           Visit Information        Provider Department      9/22/2017 3:00 PM Debbie Bro [facility] Generic      Your next 10 appointments already scheduled     Oct 03, 2017  8:00 AM CDT   Return Visit with Debbie Bro   [facility]    [address]  [facility] [address] [phone]            Oct 18, 2017 10:00 AM CDT   Return Visit with Debbie Bro   [facility] ([facility])    [address]  [facility] [address]   [phone]              MyChart Information     [employer] gives you secure access to your electronic health record. If you see a primary care provider, you can also send messages to your care team and make appointments. If you have questions, please call your primary care clinic.  If you do not have a primary care provider, please call [phone] and they will assist you.        Care EveryWhere ID     This is your Care EveryWhere ID. This could be used by other organizations to access your [place] medical records  [id number]        Equal Access to Services     SPEEDY ESQUEDA : Tonio Moralez, waaxda luqadaha, qaybta kaalmada ademaria fernanda, stephnaie goodman. So [facility] [phone].    ATENCIÓN: Si habla español, tiene a villasenor disposición servicios gratuitos de asistencia lingüística. Llmanasa al [phone].    We comply with applicable federal civil rights laws and [place] laws. We do not discriminate on the basis of race, color, national origin, age, disability sex, sexual orientation or gender identity.

## 2017-09-22 NOTE — PROGRESS NOTES
Progress Note    Client Name: Antonio Ford  Date: 9/22/2017       Service Type: Individual      Session Start Time: 3pm  Session End Time: 3:45pm   Session Length: 45 - 50     Session #: 91     Attendees: Client attended alone      PHQ-9 / FRANCISCO JAVIER-7 Due Date:  declined  DATA    Treatment Objective(s) Addressed in This Session:  will experience a reduction in depressed mood and will continue to take medications as prescribed / participate in supportive activities and services   ID stressors contributing to anxiety, and consider pros/cons of present choice  Take steps to reduce overall stress    Progress on / Status of Treatment Objective(s) / Homework:  Satisfactory progress - ACTION (Actively working towards change); Intervened by reinforcing change plan / affirming steps taken continues to work on reducing anxiety, actively job seeking  Intervention:   CBT, solution focused   Client reports he is actively being recruited by [employer] for a  position, and is looking into some other area opportunities. Discussed maslow's hierarchy of needs and making time for self and self acceptance. Gave client a list of several books he could read to improve sense of self and self confidence. Discussed that a close friend whom he had been dating, Myesha is going on a date this weekend and he has felt some what jealous, which was surprising for him. Processed emotions related to this change. Client has gone on 3 dates with Muriel and has shut down all of his dating profiles at this time to focus on this dating experience.     90-day Treatment Plan review completed: yes    Current Stressors / Issues:  family, financial, social,medical, occupational    Medication Review:  Changes to psychiatric medications, see updated Medication List in EPIC.    Current Outpatient Prescriptions   Medication     escitalopram (LEXAPRO) 10 MG tablet     fexofenadine (ALLEGRA) 180 MG tablet      DULoxetine (CYMBALTA) 30 MG EC capsule     gabapentin (NEURONTIN) 300 MG capsule     amitriptyline (ELAVIL) 50 MG tablet     No current facility-administered medications for this visit.          Medication Compliance:  Yes    Changes in Health Issues:   None reported    Chemical Use Review:   Substance Use: Chemical use reviewed, no active concerns identified      Tobacco Use: No current tobacco use.        ASSESSMENT: Current Emotional / Mental Status (status of significant symptoms):    Risk status (Self / Other harm or suicidal ideation)  Client denies current fears or concerns for personal safety.  Client denies current or recent suicidal ideation or behaviors.  Client denies current or recent homicidal ideation or behaviors.  Client denies current or recent self injurious behavior or ideation.  Client denies other safety concerns.  A safety and risk management plan has not been developed at this time, however client was given the after-hours number should there be a change in any of these risk factors.    Appearance:   Appropriate   Eye Contact:   Good   Psychomotor Behavior: Normal   Attitude:   Cooperative   Orientation:   All  Speech   Rate / Production: Normal    Volume:  Normal   Mood:    Anxious  Depressed   Affect:    Appropriate   Thought Content:  Clear   Thought Form:  Coherent  Logical   Insight:    Fair     Collateral Reports Completed:  Not Applicable    PLAN: (Homework, other)  Client will consider connecting with the [facility] to discuss financial assistance. Continue with networking groups, and connect with recruiters. Encouraged client to add exercise back into his daily routine. Encouraged client to consider taking a month to take care of himself rather than focusing on dating.       Debbie Schreiber                                                     ________________________________________________________________________    Treatment Plan    Client's Name: Antonio Ford  Date Of  Birth: 1970    Date: 8/20/2014    DSM-IV Diagnoses    AXIS I: 300.02(F41.1) FRANCISCO JAVIER  300.23(F40.10) Social Anxiety Disorder   F33.0 MDD recurrent Mild  Referral / Collaboration:  Collaboration was initiated with PCP MD.    Anticipated number of session or this episode of care: 75-80      MeasurableTreatment Goal(s) related to diagnosis / functional impairment(s)  Goal 1:Depression and anxiety: Client will decrease depressed mood and anxiety as evidenced by PHQ9 and GAD7 scores 4 and Under in the next 6 months. scoring 9/1/2015:  GAD7: 14. 1/10/2017: PHQ9:4, GAD7:3, 5/31/17:PHQ9:7, GAD7:8     I will know I've met my goal when I'm feeling less depressed and anxious, and happier in my relationship/my marriage is improving.    Objective #A (Client Action)  Status: Continued - Date(s):9/22/2017  Client will identify 1-2 initial signs or symptoms of anxiety and utilize anxiety reduction techniques to decrease anxiety. Client will ID triggers for depression and anxiety and work towards decreasing reactivity to or eliminating stressor if possible. client will develop more effective coping skills to manage depression and anxiety symptoms, will develop healthy cognitive patterns and beliefs, will increase ability to function adaptively and will continue to take medications as prescribed / participate in supportive activities. Client will improve communication and assertiveness skills and learn to set boundaries with others.    Intervention(s)   Therapist will teach client how to ID body cues for anxiety, anxiety reduction techniques, how to ID triggers for depression and anxiety- decrease reactivity/eliminate, lifestyle changes to reduce depression and anxiety, communication skills, explore cognitive beliefs and help client to develop healthy cognitive patterns and beliefs. Grief counseling.       Client has reviewed and agreed to the above plan.      Debbie WILCOX  Schreiber

## 2017-10-03 ENCOUNTER — OFFICE VISIT (OUTPATIENT)
Dept: PSYCHOLOGY | Facility: CLINIC | Age: 47
End: 2017-10-03
Payer: COMMERCIAL

## 2017-10-03 DIAGNOSIS — F33.0 MILD EPISODE OF RECURRENT MAJOR DEPRESSIVE DISORDER (H): ICD-10-CM

## 2017-10-03 DIAGNOSIS — F41.1 GENERALIZED ANXIETY DISORDER: Primary | ICD-10-CM

## 2017-10-03 DIAGNOSIS — F40.10 SOCIAL ANXIETY DISORDER: ICD-10-CM

## 2017-10-03 PROCEDURE — 90834 PSYTX W PT 45 MINUTES: CPT | Performed by: MARRIAGE & FAMILY THERAPIST
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
## 2017-10-03 NOTE — MR AVS SNAPSHOT
MRN:[id number]                      After Visit Summary   10/3/2017    Antonio Ford    MRN: [id number]           Visit Information        Provider Department      10/3/2017 8:00 AM Debbie Bro [facility] Generic      Your next 10 appointments already scheduled     Oct 18, 2017 10:00 AM CDT   Return Visit with Debbie ERIC HubbardBro   [facility] ([facility])    [address]  [facility] [address]   [phone]            Nov 01, 2017  8:00 AM CDT   Return Visit with Debbie Bro   [facility] ([facility])    [address]  [facility] [address]   [phone]              MyChart Information     [employer] gives you secure access to your electronic health record. If you see a primary care provider, you can also send messages to your care team and make appointments. If you have questions, please call your primary care clinic.  If you do not have a primary care provider, please call [phone] and they will assist you.        Care EveryWhere ID     This is your Care EveryWhere ID. This could be used by other organizations to access your [place] medical records  [id number]        Equal Access to Services     SPEEDY ESQUEDA : Tonio Moralez, waaxda luqadaha, qaybta kaalmada adesolangeda, stephanie goodman. So [facility] [phone].    ATENCIÓN: Si habla español, tiene a villasenor disposición servicios gratuitos de asistencia lingüística. Llmanasa al [phone].    We comply with applicable federal civil rights laws and [place] laws. We do not discriminate on the basis of race, color, national origin, age, disability, sex, sexual orientation, or gender identity.

## 2017-10-19 ENCOUNTER — OFFICE VISIT (OUTPATIENT)
Dept: PSYCHOLOGY | Facility: CLINIC | Age: 47
End: 2017-10-19
Payer: COMMERCIAL

## 2017-10-19 DIAGNOSIS — F33.0 MILD EPISODE OF RECURRENT MAJOR DEPRESSIVE DISORDER (H): ICD-10-CM

## 2017-10-19 DIAGNOSIS — F41.1 GENERALIZED ANXIETY DISORDER: Primary | ICD-10-CM

## 2017-10-19 DIAGNOSIS — F40.10 SOCIAL ANXIETY DISORDER: ICD-10-CM

## 2017-10-19 PROCEDURE — 90834 PSYTX W PT 45 MINUTES: CPT | Performed by: MARRIAGE & FAMILY THERAPIST

## 2017-10-19 NOTE — MR AVS SNAPSHOT
MRN:[id number]                      After Visit Summary   10/19/2017    Antonio Ford    MRN: [id number]           Visit Information        Provider Department      10/19/2017 1:00 PM Debbie Bro [facility] Generic      Your next 10 appointments already scheduled     Nov 01, 2017  8:00 AM CDT   Return Visit with Debbie Hubbardlure   [facility] ([facility])    [address]  [facility] [address]   [phone]            Nov 14, 2017  8:00 AM CST   Return Visit with Debbie Bro   [facility] ([facility])    [address]  [facility] [address]   [phone]              MyChart Information     [employer] gives you secure access to your electronic health record. If you see a primary care provider, you can also send messages to your care team and make appointments. If you have questions, please call your primary care clinic.  If you do not have a primary care provider, please call [phone] and they will assist you.        Care EveryWhere ID     This is your Care EveryWhere ID. This could be used by other organizations to access your [place] medical records  [id number]        Equal Access to Services     SPEEDY ESQUEDA : Tonio Moralez, waaxda luqadaha, qaybta kaalmada cristiano, stephanie goodman. So [facility] [phone].    ATENCIÓN: Si habla español, tiene a villasenor disposición servicios gratuitos de asistencia lingüística. Llame al [phone].    We comply with applicable federal civil rights laws and [place] laws. We do not discriminate on the basis of race, color, national origin, age, disability, sex, sexual orientation, or gender identity.

## 2017-10-19 NOTE — PROGRESS NOTES
Progress Note    Client Name: Antonio Ford  Date: 10/19/2017       Service Type: Individual      Session Start Time: 1pm  Session End Time: 1:45pm   Session Length: 45 - 50     Session #: 93     Attendees: Client attended alone      PHQ-9 / FRANCISCO JAVIER-7 Due Date:  declined  DATA    Treatment Objective(s) Addressed in This Session:  will experience a reduction in depressed mood and will continue to take medications as prescribed / participate in supportive activities and services   ID stressors contributing to anxiety, and consider pros/cons of present choice  Take steps to reduce overall stress and improve self confidence    Progress on / Status of Treatment Objective(s) / Homework:  Satisfactory progress - ACTION (Actively working towards change); Intervened by reinforcing change plan / affirming steps taken continues to work on reducing anxiety, actively job seeking  Intervention:   CBT, solution focused   Client has had 3 interviews at [employer] and will hear back next week whether he will be offered the position. He is waiting to hear back on an application for another job as well. Client reports next week he will potentially have to interview with a man at [employer] he has met previously who he often feels nervous when meeting. Discussed that often client struggle with self confidence when meeting with others who are overly confident. Encouraged client to work on self worth and confidence prior to this meeting. Encouraged client to listen to some Gabi Koroma Talks to do so. Discussed present dating scenarios and how he and each women matches up. Encouraged client to challenge some ideas related to friend Myesha.     90-day Treatment Plan review completed: yes    Current Stressors / Issues:  family, financial, social,medical, occupational    Medication Review:  No changes to current psychiatric medication(s)   Current Outpatient Prescriptions   Medication     escitalopram  (LEXAPRO) 10 MG tablet     fexofenadine (ALLEGRA) 180 MG tablet     DULoxetine (CYMBALTA) 30 MG EC capsule     gabapentin (NEURONTIN) 300 MG capsule     amitriptyline (ELAVIL) 50 MG tablet     No current facility-administered medications for this visit.          Medication Compliance:  Yes    Changes in Health Issues:   None reported    Chemical Use Review:   Substance Use: Chemical use reviewed, no active concerns identified      Tobacco Use: No current tobacco use.        ASSESSMENT: Current Emotional / Mental Status (status of significant symptoms):    Risk status (Self / Other harm or suicidal ideation)  Client denies current fears or concerns for personal safety.  Client denies current or recent suicidal ideation or behaviors.  Client denies current or recent homicidal ideation or behaviors.  Client denies current or recent self injurious behavior or ideation.  Client denies other safety concerns.  A safety and risk management plan has not been developed at this time, however client was given the after-hours number should there be a change in any of these risk factors.    Appearance:   Appropriate   Eye Contact:   Good   Psychomotor Behavior: Normal   Attitude:   Cooperative   Orientation:   All  Speech   Rate / Production: Normal    Volume:  Normal   Mood:    Anxious   Affect:    Appropriate   Thought Content:  Clear   Thought Form:  Coherent  Logical   Insight:    Fair     Collateral Reports Completed:  Not Applicable    PLAN: (Homework, other)  Client will consider connecting with the [facility] to discuss financial assistance. Continue with networking groups, and connect with recruiters. Encouraged client to add exercise back into his daily routine. Encouraged client to consider taking a month to take care of himself rather than focusing on dating. Encouraged client to work on self worth and confidence prior to this meeting. Encouraged client to listen to some Gabi Koroma Talks to do so.  Encouraged client  to challenge some ideas related to friend Hari Schreiber                                                     ________________________________________________________________________    Treatment Plan    Client's Name: Antonio Ford  YOB: 1970    Date: 8/20/2014    DSM-IV Diagnoses    AXIS I: 300.02(F41.1) FRANCISCO JAVIER  300.23(F40.10) Social Anxiety Disorder   F33.0 MDD recurrent Mild  Referral / Collaboration:  Collaboration was initiated with PCP MD.    Anticipated number of session or this episode of care: 75-80      MeasurableTreatment Goal(s) related to diagnosis / functional impairment(s)  Goal 1:Depression and anxiety: Client will decrease depressed mood and anxiety as evidenced by PHQ9 and GAD7 scores 4 and Under in the next 6 months. scoring 9/1/2015:  GAD7: 14. 1/10/2017: PHQ9:4, GAD7:3, 5/31/17:PHQ9:7, GAD7:8     I will know I've met my goal when I'm feeling less depressed and anxious, and happier in my relationship/my marriage is improving.    Objective #A (Client Action)  Status: Continued - Date(s):10/19/2017  Client will identify 1-2 initial signs or symptoms of anxiety and utilize anxiety reduction techniques to decrease anxiety. Client will ID triggers for depression and anxiety and work towards decreasing reactivity to or eliminating stressor if possible. client will develop more effective coping skills to manage depression and anxiety symptoms, will develop healthy cognitive patterns and beliefs, will increase ability to function adaptively and will continue to take medications as prescribed / participate in supportive activities. Client will improve communication and assertiveness skills and learn to set boundaries with others.    Intervention(s)   Therapist will teach client how to ID body cues for anxiety, anxiety reduction techniques, how to ID triggers for depression and anxiety- decrease reactivity/eliminate, lifestyle changes to reduce depression and anxiety,  communication skills, explore cognitive beliefs and help client to develop healthy cognitive patterns and beliefs. Grief counseling.       Client has reviewed and agreed to the above plan.      Debbie Schreiber

## 2017-11-01 ENCOUNTER — OFFICE VISIT (OUTPATIENT)
Dept: PSYCHOLOGY | Facility: CLINIC | Age: 47
End: 2017-11-01
Payer: COMMERCIAL

## 2017-11-01 DIAGNOSIS — F41.1 GENERALIZED ANXIETY DISORDER: Primary | ICD-10-CM

## 2017-11-01 DIAGNOSIS — F33.0 MILD EPISODE OF RECURRENT MAJOR DEPRESSIVE DISORDER (H): ICD-10-CM

## 2017-11-01 DIAGNOSIS — F40.10 SOCIAL ANXIETY DISORDER: ICD-10-CM

## 2017-11-01 PROCEDURE — 90834 PSYTX W PT 45 MINUTES: CPT | Performed by: MARRIAGE & FAMILY THERAPIST

## 2017-11-01 NOTE — MR AVS SNAPSHOT
MRN:[id number]                      After Visit Summary   11/1/2017    Antonio Ford    MRN: [id number]           Visit Information        Provider Department      11/1/2017 8:00 AM Debbie Bro [facility] Generic      Your next 10 appointments already scheduled     Nov 14, 2017  8:00 AM CST   Return Visit with Debbie ERIC HubbardBro   [facility] ([facility])    [address]  [facility] [id number] [phone]            Nov 29, 2017  8:00 AM CST   Return Visit with Debbie Bro   [facility] ([facility])    [address]  [facility] [id number] [phone]              MyChart Information     [employer] gives you secure access to your electronic health record. If you see a primary care provider, you can also send messages to your care team and make appointments. If you have questions, please call your primary care clinic.  If you do not have a primary care provider, please call [phone] and they will assist you.        Care EveryWhere ID     This is your Care EveryWhere ID. This could be used by other organizations to access your [place] medical records  [id number]        Equal Access to Services     SPEEDY ESQUEDA : Tonio Moralez, waaxda luleslieadaha, qaybta kaalmada cristiano, stephanie goodman. So [facility] [phone].    ATENCIÓN: Si habla español, tiene a villasenor disposición servicios gratuitos de asistencia lingüística. Llame al [phone].    We comply with applicable federal civil rights laws and [place] laws. We do not discriminate on the basis of race, color, national origin, age, disability, sex, sexual orientation, or gender identity.
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
## 2017-11-14 ENCOUNTER — OFFICE VISIT (OUTPATIENT)
Dept: PSYCHOLOGY | Facility: CLINIC | Age: 47
End: 2017-11-14
Payer: COMMERCIAL

## 2017-11-14 DIAGNOSIS — F41.1 GENERALIZED ANXIETY DISORDER: Primary | ICD-10-CM

## 2017-11-14 DIAGNOSIS — F33.0 MILD EPISODE OF RECURRENT MAJOR DEPRESSIVE DISORDER (H): ICD-10-CM

## 2017-11-14 DIAGNOSIS — F40.10 SOCIAL ANXIETY DISORDER: ICD-10-CM

## 2017-11-14 PROCEDURE — 90834 PSYTX W PT 45 MINUTES: CPT | Performed by: MARRIAGE & FAMILY THERAPIST

## 2017-11-14 ASSESSMENT — ANXIETY QUESTIONNAIRES
7. FEELING AFRAID AS IF SOMETHING AWFUL MIGHT HAPPEN: NOT AT ALL
1. FEELING NERVOUS, ANXIOUS, OR ON EDGE: SEVERAL DAYS
5. BEING SO RESTLESS THAT IT IS HARD TO SIT STILL: NOT AT ALL
GAD7 TOTAL SCORE: 3
2. NOT BEING ABLE TO STOP OR CONTROL WORRYING: SEVERAL DAYS
6. BECOMING EASILY ANNOYED OR IRRITABLE: NOT AT ALL
3. WORRYING TOO MUCH ABOUT DIFFERENT THINGS: SEVERAL DAYS
IF YOU CHECKED OFF ANY PROBLEMS ON THIS QUESTIONNAIRE, HOW DIFFICULT HAVE THESE PROBLEMS MADE IT FOR YOU TO DO YOUR WORK, TAKE CARE OF THINGS AT HOME, OR GET ALONG WITH OTHER PEOPLE: SOMEWHAT DIFFICULT

## 2017-11-14 ASSESSMENT — PATIENT HEALTH QUESTIONNAIRE - PHQ9
SUM OF ALL RESPONSES TO PHQ QUESTIONS 1-9: 5
5. POOR APPETITE OR OVEREATING: NOT AT ALL

## 2017-11-14 NOTE — MR AVS SNAPSHOT
MRN:[id number]                      After Visit Summary   11/14/2017    Antonio Ford    MRN: [id number]           Visit Information        Provider Department      11/14/2017 8:00 AM Debbie Bro [facility] Generic      Your next 10 appointments already scheduled     Nov 29, 2017  8:00 AM CST   Return Visit with Debbie ERIC Bro   [facility] ([facility])    [address]  [facility] [id number] [phone]            Dec 12, 2017  8:00 AM CST   Return Visit with Debbie Bro   [facility] ([facility])    [address]  [facility] [id number] [phone]              MyChart Information     [employer] gives you secure access to your electronic health record. If you see a primary care provider, you can also send messages to your care team and make appointments. If you have questions, please call your primary care clinic.  If you do not have a primary care provider, please call [phone] and they will assist you.        Care EveryWhere ID     This is your Care EveryWhere ID. This could be used by other organizations to access your [place] medical records  [id number]        Equal Access to Services     SPEEDY ESQUEDA : Tonio Moralez, waaxda luleslieadaha, qaybta kaalmada cristiano, stephanie goodman. So [facility] [phone].    ATENCIÓN: Si habla español, tiene a villasenor disposición servicios gratuitos de asistencia lingüística. Llame al [phone].    We comply with applicable federal civil rights laws and [place] laws. We do not discriminate on the basis of race, color, national origin, age, disability, sex, sexual orientation, or gender identity.

## 2017-11-14 NOTE — PROGRESS NOTES
Progress Note    Client Name: Antonio Ford  Date: 11/14/2017       Service Type: Individual      Session Start Time: 8am  Session End Time: 8:50am   Session Length: 45 - 50     Session #: 95     Attendees: Client attended alone      PHQ-9 / FRANCISCO JAVIER-7 Due Date:  Completed today  DATA    Treatment Objective(s) Addressed in This Session:  will experience a reduction in depressed mood  will experience a reduction in anxiety and will develop more effective coping skills to manage anxiety symptoms      Progress on / Status of Treatment Objective(s) / Homework:  Satisfactory progress - ACTION (Actively working towards change); Intervened by reinforcing change plan / affirming steps taken continues to work on reducing anxiety, actively job seeking  Intervention:   CBT, solution focused   Client was told by [employer] he will most likely be getting an employment offer this week. 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 Reports new relationship continues to go well. Today she will meet his children. He has met her youngest two children, however her 17 year old daughter is not interested in meeting client. Discussed that girlfriend's children have a hx of trauma with previous , so may be difficult for oldest to connect. Encouraged client to be supportive and allow her daughter time. Discussed that relationship has been moving fast. Encouraged client not to yao into things and to take relationship slow. Client reports he has been able to remain out of adult's son's schooling and has been allowing to finishing work on his own. Discussed upcoming discussion needing to be made to either stay in current rental property or consider a move. Processed pros/cons.     90-day Treatment Plan review completed: yes    Current Stressors / Issues:  family, financial, social,medical, occupational    Medication Review:  No changes  to current psychiatric medication(s)   Current Outpatient Prescriptions   Medication     escitalopram (LEXAPRO) 10 MG tablet     fexofenadine (ALLEGRA) 180 MG tablet     DULoxetine (CYMBALTA) 30 MG EC capsule     gabapentin (NEURONTIN) 300 MG capsule     amitriptyline (ELAVIL) 50 MG tablet     No current facility-administered medications for this visit.          Medication Compliance:  Yes    Changes in Health Issues:   None reported    Chemical Use Review:   Substance Use: Chemical use reviewed, no active concerns identified      Tobacco Use: No current tobacco use.        ASSESSMENT: Current Emotional / Mental Status (status of significant symptoms):    Risk status (Self / Other harm or suicidal ideation)  Client denies current fears or concerns for personal safety.  Client denies current or recent suicidal ideation or behaviors.  Client denies current or recent homicidal ideation or behaviors.  Client denies current or recent self injurious behavior or ideation.  Client denies other safety concerns.  A safety and risk management plan has not been developed at this time, however client was given the after-hours number should there be a change in any of these risk factors.    Appearance:   Appropriate   Eye Contact:   Good   Psychomotor Behavior: Normal   Attitude:   Cooperative   Orientation:   All  Speech   Rate / Production: Normal    Volume:  Normal   Mood:    Anxious   Affect:    Appropriate   Thought Content:  Clear   Thought Form:  Coherent  Logical   Insight:    Fair     Collateral Reports Completed:  Not Applicable    PLAN: (Homework, other)  Continue with networking groups, and connect with recruiters. Encouraged client to be supportive and allow girlfriend's daughter time. Encouraged client not to yao into things and to take relationship slow. Client will weigh out pros/cons related to upcoming discussion needing to be made to either stay in current rental property or consider a move.      Debbie WILCOX  Schreiber                                                     ________________________________________________________________________    Treatment Plan    Client's Name: Antonio Ford  YOB: 1970    Date: 8/20/2014    DSM-IV Diagnoses    AXIS I: 300.02(F41.1) FRANCISCO JAVIER  300.23(F40.10) Social Anxiety Disorder   F33.0 MDD recurrent Mild  Referral / Collaboration:  Collaboration was initiated with PCP MD.    Anticipated number of session or this episode of care: 75-80      MeasurableTreatment Goal(s) related to diagnosis / functional impairment(s)  Goal 1:Depression and anxiety: Client will decrease depressed mood and anxiety as evidenced by PHQ9 and GAD7 scores 4 and Under in the next 6 months. scoring 9/1/2015:  GAD7: 14. 1/10/2017: PHQ9:4, GAD7:3, 5/31/17:PHQ9:7, GAD7:8, 11/14/2017: PHQ9:5, GAD7:3     I will know I've met my goal when I'm feeling less depressed and anxious, and happier in my relationship/my marriage is improving.    Objective #A (Client Action)  Status: Continued - Date(s):11/14/2017  Client will identify 1-2 initial signs or symptoms of anxiety and utilize anxiety reduction techniques to decrease anxiety. Client will ID triggers for depression and anxiety and work towards decreasing reactivity to or eliminating stressor if possible. client will develop more effective coping skills to manage depression and anxiety symptoms, will develop healthy cognitive patterns and beliefs, will increase ability to function adaptively and will continue to take medications as prescribed / participate in supportive activities. Client will improve communication and assertiveness skills and learn to set boundaries with others.    Intervention(s)   Therapist will teach client how to ID body cues for anxiety, anxiety reduction techniques, how to ID triggers for depression and anxiety- decrease reactivity/eliminate, lifestyle changes to reduce depression and anxiety, communication skills, explore cognitive beliefs  and help client to develop healthy cognitive patterns and beliefs. Grief counseling.       Client has reviewed and agreed to the above plan.      Debbie Schreiber

## 2017-11-15 ASSESSMENT — ANXIETY QUESTIONNAIRES: GAD7 TOTAL SCORE: 3

## 2017-11-27 ENCOUNTER — MYC MEDICAL ADVICE (OUTPATIENT)
Dept: FAMILY MEDICINE | Facility: OTHER | Age: 47
End: 2017-11-27

## 2017-11-27 DIAGNOSIS — N48.89 PENILE PAIN: ICD-10-CM

## 2017-11-29 ENCOUNTER — OFFICE VISIT (OUTPATIENT)
Dept: PSYCHOLOGY | Facility: CLINIC | Age: 47
End: 2017-11-29
Payer: COMMERCIAL

## 2017-11-29 DIAGNOSIS — F33.0 MILD EPISODE OF RECURRENT MAJOR DEPRESSIVE DISORDER (H): ICD-10-CM

## 2017-11-29 DIAGNOSIS — F40.10 SOCIAL ANXIETY DISORDER: ICD-10-CM

## 2017-11-29 DIAGNOSIS — F41.1 GENERALIZED ANXIETY DISORDER: Primary | ICD-10-CM

## 2017-11-29 PROCEDURE — 90834 PSYTX W PT 45 MINUTES: CPT | Performed by: MARRIAGE & FAMILY THERAPIST

## 2017-11-29 RX ORDER — AMITRIPTYLINE HYDROCHLORIDE 50 MG/1
50 TABLET ORAL AT BEDTIME
Qty: 30 TABLET | Refills: 5 | Status: SHIPPED | OUTPATIENT
Start: 2017-11-29 | End: 2018-02-23

## 2017-11-29 NOTE — PROGRESS NOTES
Progress Note    Client Name: Antonio Ford  Date: 11/29/2017       Service Type: Individual      Session Start Time: 8am  Session End Time: 8:50am   Session Length: 45 - 50     Session #: 96     Attendees: Client attended alone      PHQ-9 / FRANCISCO JAVIER-7 Due Date:  Completed 11/14/17, declined  DATA    Treatment Objective(s) Addressed in This Session:  will experience a reduction in depressed mood  will experience a reduction in anxiety and will develop more effective coping skills to manage anxiety symptoms      Progress on / Status of Treatment Objective(s) / Homework:  Satisfactory progress - ACTION (Actively working towards change); Intervened by reinforcing change plan / affirming steps taken continues to work on reducing anxiety, obtained a job, dating    Intervention:   CBT, solution focused   Client was offered job with [employer] and starts Monday. Client and family are excited for this change and the financial stability. Discussed upcoming decisions needing to be made related to moving vs staying in present rental. Processed related anxiety. Discussed options client could offer adult son related to housing/rent. Relationship continues to go well.     90-day Treatment Plan review completed: yes    Current Stressors / Issues:  family, financial, social,medical  Medication Review:  No changes to current psychiatric medication(s)   Current Outpatient Prescriptions   Medication     escitalopram (LEXAPRO) 10 MG tablet     fexofenadine (ALLEGRA) 180 MG tablet     DULoxetine (CYMBALTA) 30 MG EC capsule     gabapentin (NEURONTIN) 300 MG capsule     amitriptyline (ELAVIL) 50 MG tablet     No current facility-administered medications for this visit.          Medication Compliance:  Yes    Changes in Health Issues:   None reported    Chemical Use Review:   Substance Use: Chemical use reviewed, no active concerns identified      Tobacco Use: No current tobacco use.        ASSESSMENT:  Current Emotional / Mental Status (status of significant symptoms):    Risk status (Self / Other harm or suicidal ideation)  Client denies current fears or concerns for personal safety.  Client denies current or recent suicidal ideation or behaviors.  Client denies current or recent homicidal ideation or behaviors.  Client denies current or recent self injurious behavior or ideation.  Client denies other safety concerns.  A safety and risk management plan has not been developed at this time, however client was given the after-hours number should there be a change in any of these risk factors.    Appearance:   Appropriate   Eye Contact:   Good   Psychomotor Behavior: Normal   Attitude:   Cooperative   Orientation:   All  Speech   Rate / Production: Normal    Volume:  Normal   Mood:    Anxious   Affect:    Appropriate   Thought Content:  Clear   Thought Form:  Coherent  Logical   Insight:    Fair     Collateral Reports Completed:  Not Applicable    PLAN: (Homework, other)  Encouraged client to be supportive and allow girlfriend's daughter time. Encouraged client not to yao into things and to take relationship slow. Client will weigh out pros/cons related to upcoming discussion needing to be made to either stay in current rental property or consider a move. Talk with son about options to rent vs move out on his own.     Debbie Schreiber                                                     ________________________________________________________________________    Treatment Plan    Client's Name: Antonio Ford  YOB: 1970    Date: 8/20/2014    DSM-IV Diagnoses    AXIS I: 300.02(F41.1) FRANCISCO JAVIER  300.23(F40.10) Social Anxiety Disorder   F33.0 MDD recurrent Mild  Referral / Collaboration:  Collaboration was initiated with PCP MD.    Anticipated number of session or this episode of care: 75-80      MeasurableTreatment Goal(s) related to diagnosis / functional impairment(s)  Goal 1:Depression and anxiety: Client  will decrease depressed mood and anxiety as evidenced by PHQ9 and GAD7 scores 4 and Under in the next 6 months. scoring 9/1/2015:  GAD7: 14. 1/10/2017: PHQ9:4, GAD7:3, 5/31/17:PHQ9:7, GAD7:8, 11/14/2017: PHQ9:5, GAD7:3     I will know I've met my goal when I'm feeling less depressed and anxious, and happier in my relationship/my marriage is improving.    Objective #A (Client Action)  Status: Continued - Date(s):11/29/2017  Client will identify 1-2 initial signs or symptoms of anxiety and utilize anxiety reduction techniques to decrease anxiety. Client will ID triggers for depression and anxiety and work towards decreasing reactivity to or eliminating stressor if possible. client will develop more effective coping skills to manage depression and anxiety symptoms, will develop healthy cognitive patterns and beliefs, will increase ability to function adaptively and will continue to take medications as prescribed / participate in supportive activities. Client will improve communication and assertiveness skills and learn to set boundaries with others.    Intervention(s)   Therapist will teach client how to ID body cues for anxiety, anxiety reduction techniques, how to ID triggers for depression and anxiety- decrease reactivity/eliminate, lifestyle changes to reduce depression and anxiety, communication skills, explore cognitive beliefs and help client to develop healthy cognitive patterns and beliefs. Grief counseling.       Client has reviewed and agreed to the above plan.      Debbie Schreiber

## 2017-11-29 NOTE — MR AVS SNAPSHOT
MRN:[id number]                      After Visit Summary   11/29/2017    Antonio Ford    MRN: [id number]           Visit Information        Provider Department      11/29/2017 8:00 AM Debbie Bro [facility] Generic      Your next 10 appointments already scheduled     Dec 12, 2017  8:00 AM CST   Return Visit with Debbie Bro   [facility] ([facility])    [address]  [facility] [address]   [phone]              MyChart Information     [employer] gives you secure access to your electronic health record. If you see a primary care provider, you can also send messages to your care team and make appointments. If you have questions, please call your primary care clinic.  If you do not have a primary care provider, please call [phone] and they will assist you.        Care EveryWhere ID     This is your Care EveryWhere ID. This could be used by other organizations to access your [place] medical records  [id number]        Equal Access to Services     SPEEDY ESQUEDA : Hadii ernestina roweo Sobrissa, waaxda luqadaha, qaybta kaalmada adeestebanyaattila, stephanie goodman. So [facility] [phone].    ATENCIÓN: Si habla español, tiene a villasenor disposición servicios gratuitos de asistencia lingüística. Llame al [phone].    We comply with applicable federal civil rights laws and [place] laws. We do not discriminate on the basis of race, color, national origin, age, disability, sex, sexual orientation, or gender identity.

## 2017-11-29 NOTE — TELEPHONE ENCOUNTER
Called patient and LM for return call back to clinic. When call is returned please give message below. Santa Malik MA

## 2017-11-29 NOTE — TELEPHONE ENCOUNTER
Refill sent as we discussed this in June. Please see how his anxiety is going as he may need a f/u if still not well controlled.    Josh Morales PA-C

## 2017-12-28 ENCOUNTER — OFFICE VISIT (OUTPATIENT)
Dept: PSYCHOLOGY | Facility: CLINIC | Age: 47
End: 2017-12-28
Payer: COMMERCIAL

## 2017-12-28 DIAGNOSIS — F33.0 MILD EPISODE OF RECURRENT MAJOR DEPRESSIVE DISORDER (H): ICD-10-CM

## 2017-12-28 DIAGNOSIS — F41.1 GENERALIZED ANXIETY DISORDER: Primary | ICD-10-CM

## 2017-12-28 DIAGNOSIS — F40.10 SOCIAL ANXIETY DISORDER: ICD-10-CM

## 2017-12-28 PROCEDURE — 90834 PSYTX W PT 45 MINUTES: CPT | Performed by: MARRIAGE & FAMILY THERAPIST

## 2017-12-28 NOTE — MR AVS SNAPSHOT
MRN:[id number]                      After Visit Summary   12/28/2017    Antonio Ford    MRN: [id number]           Visit Information        Provider Department      12/28/2017 11:30 AM Debbie Bro [facility] Generic      Your next 10 appointments already scheduled     Jan 12, 2018  8:00 AM CST   Return Visit with Debbie Hubbardlure   [facility] ([facility])    [address]  [facility] [address]   [phone]            Jan 25, 2018  5:00 PM CST   Return Visit with Debbie ERIC HubbardBro   [facility] ([facility])    [address]  [facility] [address]   [phone]            Feb 05, 2018  5:00 PM CST   Return Visit with Debbie ERIC Bro   [facility] ([facility])    [address]  [facility] [address]   [phone]              MyChart Information     [employer] gives you secure access to your electronic health record. If you see a primary care provider, you can also send messages to your care team and make appointments. If you have questions, please call your primary care clinic.  If you do not have a primary care provider, please call [phone] and they will assist you.        Care EveryWhere ID     This is your Care EveryWhere ID. This could be used by other organizations to access your [place] medical records  [id number]        Equal Access to Services     SPEEDY ESQUEDA AH: Hadii ernestina roweo Sobrissa, waaxda luqadaha, qaybta kaalmada adeestebanyada, waxay luis vásquez adeesteban goodman. So [facility] [phone].    ATENCIÓN: Si habla español, tiene a villasenor disposición servicios gratuitos de asistencia lingüística. Llame al [phone].    We comply with applicable federal civil rights laws and [place] laws. We do not discriminate on the basis of race, color, national origin, age, disability, sex, sexual orientation, or  gender identity.

## 2017-12-28 NOTE — PROGRESS NOTES
Progress Note    Client Name: Antonio Ford  Date: 12/28/2017       Service Type: Individual      Session Start Time: 11:30am  Session End Time: 12:15pm   Session Length: 45 - 50     Session #: 97     Attendees: Client attended alone      PHQ-9 / FRANCISCO JAVIER-7 Due Date:  declined  DATA    Treatment Objective(s) Addressed in This Session:  will experience a reduction in depressed mood  will experience a reduction in anxiety and will develop more effective coping skills to manage anxiety symptoms      Progress on / Status of Treatment Objective(s) / Homework:  Satisfactory progress - ACTION (Actively working towards change); Intervened by reinforcing change plan / affirming steps taken continues to work on reducing anxiety, obtained a job, dating    Intervention:   CBT, solution focused   Client started job with [employer] about a month ago.  He is liking the position and the people, however wishes his pay would increase and is presently commuting in daily.  Reports some social anxiety meeting new coworkers and people at work, but is able to get through the anxious moment.  Discussed stress during Krystle gathering at his parent's house,with son being unwilling to take his hat off during the meal and while at [employer].  Processed ongoing stress related to girlfriends 17-year-old daughter not wanting to meet him.  Encouraged client to talk with girlfriend about an event that occurred several days ago in which he was asked to wait in his car until she and daughter left before going into her home to wait for her, as daughter does not want to meet client as she feels it is too soon. Discussed power in girlfriend's home being run by her 17 yr old daughter. Encouraged client to use Gabi Nicole strategy: the story I am telling myself is to anca how he is feeling, and how they can process forward. Client has decided to stay in current rental property, and son will rent the basement out and be  given some additional freedoms.     90-day Treatment Plan review completed: yes    Current Stressors / Issues:  family, financial, social,medical  Medication Review:  No changes to current psychiatric medication(s)   Current Outpatient Prescriptions   Medication     amitriptyline (ELAVIL) 50 MG tablet     escitalopram (LEXAPRO) 10 MG tablet     fexofenadine (ALLEGRA) 180 MG tablet     DULoxetine (CYMBALTA) 30 MG EC capsule     gabapentin (NEURONTIN) 300 MG capsule     No current facility-administered medications for this visit.          Medication Compliance:  Yes    Changes in Health Issues:   None reported    Chemical Use Review:   Substance Use: Chemical use reviewed, no active concerns identified      Tobacco Use: No current tobacco use.        ASSESSMENT: Current Emotional / Mental Status (status of significant symptoms):    Risk status (Self / Other harm or suicidal ideation)  Client denies current fears or concerns for personal safety.  Client denies current or recent suicidal ideation or behaviors.  Client denies current or recent homicidal ideation or behaviors.  Client denies current or recent self injurious behavior or ideation.  Client denies other safety concerns.  A safety and risk management plan has not been developed at this time, however client was given the after-hours number should there be a change in any of these risk factors.    Appearance:   Appropriate   Eye Contact:   Good   Psychomotor Behavior: Normal   Attitude:   Cooperative   Orientation:   All  Speech   Rate / Production: Normal    Volume:  Normal   Mood:    Anxious   Affect:    Appropriate   Thought Content:  Clear   Thought Form:  Coherent  Logical   Insight:    Fair     Collateral Reports Completed:  Not Applicable    PLAN: (Homework, other)  Encouraged client to talk with girlfriend about how he felt waiting in his car the other day using Gabi Nicole's strategies.                                                     ________________________________________________________________________    Treatment Plan    Client's Name: Antonio Ford  YOB: 1970    Date: 8/20/2014    DSM-IV Diagnoses    AXIS I: 300.02(F41.1) FRANCISCO JAVIER  300.23(F40.10) Social Anxiety Disorder   F33.0 MDD recurrent Mild  Referral / Collaboration:  Collaboration was initiated with PCP MD.    Anticipated number of session or this episode of care: 75-80      MeasurableTreatment Goal(s) related to diagnosis / functional impairment(s)  Goal 1:Depression and anxiety: Client will decrease depressed mood and anxiety as evidenced by PHQ9 and GAD7 scores 4 and Under in the next 6 months. scoring 9/1/2015:  GAD7: 14. 1/10/2017: PHQ9:4, GAD7:3, 5/31/17:PHQ9:7, GAD7:8, 11/14/2017: PHQ9:5, GAD7:3     I will know I've met my goal when I'm feeling less depressed and anxious, and happier in my relationship/my marriage is improving.    Objective #A (Client Action)  Status: Continued - Date(s):12/28/2017  Client will identify 1-2 initial signs or symptoms of anxiety and utilize anxiety reduction techniques to decrease anxiety. Client will ID triggers for depression and anxiety and work towards decreasing reactivity to or eliminating stressor if possible. client will develop more effective coping skills to manage depression and anxiety symptoms, will develop healthy cognitive patterns and beliefs, will increase ability to function adaptively and will continue to take medications as prescribed / participate in supportive activities. Client will improve communication and assertiveness skills and learn to set boundaries with others.    Intervention(s)   Therapist will teach client how to ID body cues for anxiety, anxiety reduction techniques, how to ID triggers for depression and anxiety- decrease reactivity/eliminate, lifestyle changes to reduce depression and anxiety, communication skills, explore cognitive beliefs and help client to develop healthy cognitive patterns  and beliefs. Grief counseling.       Client has reviewed and agreed to the above plan.      Debbie Schreiber

## 2018-01-12 ENCOUNTER — OFFICE VISIT (OUTPATIENT)
Dept: PSYCHOLOGY | Facility: CLINIC | Age: 48
End: 2018-01-12
Payer: COMMERCIAL

## 2018-01-12 DIAGNOSIS — F33.0 MILD EPISODE OF RECURRENT MAJOR DEPRESSIVE DISORDER (H): ICD-10-CM

## 2018-01-12 DIAGNOSIS — F40.10 SOCIAL ANXIETY DISORDER: ICD-10-CM

## 2018-01-12 DIAGNOSIS — F41.1 GENERALIZED ANXIETY DISORDER: Primary | ICD-10-CM

## 2018-01-12 PROCEDURE — 90834 PSYTX W PT 45 MINUTES: CPT | Performed by: MARRIAGE & FAMILY THERAPIST

## 2018-01-12 ASSESSMENT — PATIENT HEALTH QUESTIONNAIRE - PHQ9
SUM OF ALL RESPONSES TO PHQ QUESTIONS 1-9: 6
5. POOR APPETITE OR OVEREATING: SEVERAL DAYS

## 2018-01-12 ASSESSMENT — ANXIETY QUESTIONNAIRES
3. WORRYING TOO MUCH ABOUT DIFFERENT THINGS: SEVERAL DAYS
IF YOU CHECKED OFF ANY PROBLEMS ON THIS QUESTIONNAIRE, HOW DIFFICULT HAVE THESE PROBLEMS MADE IT FOR YOU TO DO YOUR WORK, TAKE CARE OF THINGS AT HOME, OR GET ALONG WITH OTHER PEOPLE: SOMEWHAT DIFFICULT
2. NOT BEING ABLE TO STOP OR CONTROL WORRYING: SEVERAL DAYS
GAD7 TOTAL SCORE: 7
6. BECOMING EASILY ANNOYED OR IRRITABLE: NOT AT ALL
1. FEELING NERVOUS, ANXIOUS, OR ON EDGE: MORE THAN HALF THE DAYS
7. FEELING AFRAID AS IF SOMETHING AWFUL MIGHT HAPPEN: SEVERAL DAYS
5. BEING SO RESTLESS THAT IT IS HARD TO SIT STILL: SEVERAL DAYS

## 2018-01-12 NOTE — PROGRESS NOTES
Progress Note    Client Name: Antonio Ford  Date: 1/12/2018       Service Type: Individual      Session Start Time: 8:06am  Session End Time: 8:56am   Session Length: 45 - 50     Session #: 98     Attendees: Client attended alone      PHQ-9 / FRANCISCO JAVIER-7 Due Date:  Completed today  DATA    Treatment Objective(s) Addressed in This Session:  identify at least 1-2 triggers for anxiety  Improve quantity and quality of night time sleep / decrease daytime naps  Feel less tired and more energy during the day       Progress on / Status of Treatment Objective(s) / Homework:  Satisfactory progress - ACTION (Actively working towards change); Intervened by reinforcing change plan / affirming steps taken continues to work on reducing anxiety, obtained a job, dating    Intervention:   CBT, solution focused   Client reports he is acclimating well to the job, enjoys his coworkers, and is feeling more confident in his position at [employer].  Reports he was able to talk to girlfriend about ongoing issues related to her 17-year-old daughter not wanting to meet him.  They have decided that they are going to do what they would like to do whether daughter approves or not, and that it is her issue to not be involved.  Over the holidays client's children met his girlfriend, and all of their children with an exception of girlfriends 17-year-old daughter went bowling together which went well.  Client's adult son has decided to agreed to rent his basement  for the remainder of his lease which is a year.  Client feels this will help bring his budget back together, but reports his finances are still tight.  Client reports he has had been having some trouble sleeping at night. Discussed that client continues to supplement with vitamin D and B, but remains low energy during the day.  Discussed the possibility of using a happy light to improve energy.  Discussed sleep hygiene, and using the following anxiety  reduction techniques to improve sleep: journaling, progressive muscle relaxation, deep breathing, visualization/guided imagery.        90-day Treatment Plan review completed: yes    Current Stressors / Issues:  family, financial, social,medical  Medication Review:  No changes to current psychiatric medication(s)   Current Outpatient Prescriptions   Medication     amitriptyline (ELAVIL) 50 MG tablet     escitalopram (LEXAPRO) 10 MG tablet     fexofenadine (ALLEGRA) 180 MG tablet     DULoxetine (CYMBALTA) 30 MG EC capsule     gabapentin (NEURONTIN) 300 MG capsule     No current facility-administered medications for this visit.          Medication Compliance:  Yes    Changes in Health Issues:   None reported    Chemical Use Review:   Substance Use: Chemical use reviewed, no active concerns identified      Tobacco Use: No current tobacco use.        ASSESSMENT: Current Emotional / Mental Status (status of significant symptoms):    Risk status (Self / Other harm or suicidal ideation)  Client denies current fears or concerns for personal safety.  Client denies current or recent suicidal ideation or behaviors.  Client denies current or recent homicidal ideation or behaviors.  Client denies current or recent self injurious behavior or ideation.  Client denies other safety concerns.  A safety and risk management plan has not been developed at this time, however client was given the after-hours number should there be a change in any of these risk factors.    Appearance:   Appropriate   Eye Contact:   Good   Psychomotor Behavior: Normal   Attitude:   Cooperative   Orientation:   All  Speech   Rate / Production: Normal    Volume:  Normal   Mood:    Anxious   Affect:    Appropriate   Thought Content:  Clear   Thought Form:  Coherent  Logical   Insight:    Fair     Collateral Reports Completed:  Not Applicable    PLAN: (Homework, other)  Begin working on sleep hygiene, and using the following anxiety reduction techniques to  improve sleep: journaling, progressive muscle relaxation, deep breathing, visualization/guided imagery.  Consider use of a happy light.                                                    ________________________________________________________________________    Treatment Plan    Client's Name: Antonio Ford  YOB: 1970    Date: 8/20/2014    DSM-IV Diagnoses    AXIS I: 300.02(F41.1) FRANCISCO JAVIER  300.23(F40.10) Social Anxiety Disorder   F33.0 MDD recurrent Mild  Referral / Collaboration:  Collaboration was initiated with PCP MD.    Anticipated number of session or this episode of care: 75-80      MeasurableTreatment Goal(s) related to diagnosis / functional impairment(s)  Goal 1:Depression and anxiety: Client will decrease depressed mood and anxiety as evidenced by PHQ9 and GAD7 scores 4 and Under in the next 6 months. scoring 9/1/2015:  GAD7: 14. 1/10/2017: PHQ9:4, GAD7:3, 5/31/17:PHQ9:7, GAD7:8, 11/14/2017: PHQ9:5, GAD7:3, 1/12/2018: PHQ9:6, GAD7:7- increase due to new job     I will know I've met my goal when I'm feeling less depressed and anxious, and happier in my relationship/my marriage is improving.    Objective #A (Client Action)  Status: Continued - Date(s):1/12/2018  Client will identify 1-2 initial signs or symptoms of anxiety and utilize anxiety reduction techniques to decrease anxiety. Client will ID triggers for depression and anxiety and work towards decreasing reactivity to or eliminating stressor if possible. client will develop more effective coping skills to manage depression and anxiety symptoms, will develop healthy cognitive patterns and beliefs, will increase ability to function adaptively and will continue to take medications as prescribed / participate in supportive activities. Client will improve communication and assertiveness skills and learn to set boundaries with others.    Intervention(s)   Therapist will teach client how to ID body cues for anxiety, anxiety reduction techniques,  how to ID triggers for depression and anxiety- decrease reactivity/eliminate, lifestyle changes to reduce depression and anxiety, communication skills, explore cognitive beliefs and help client to develop healthy cognitive patterns and beliefs. Grief counseling.       Client has reviewed and agreed to the above plan.      Debbie Schreiber

## 2018-01-12 NOTE — MR AVS SNAPSHOT
MRN:[id number]                      After Visit Summary   1/12/2018    Antonio Ford    MRN: [id number]           Visit Information        Provider Department      1/12/2018 8:00 AM Debbie Bro [facility] Generic      Your next 10 appointments already scheduled     Jan 25, 2018  5:00 PM CST   Return Visit with Debbie ERIC HubbardBro   [facility]    [address]  [facility] [address] [phone]            Feb 05, 2018  5:00 PM CST   Return Visit with Debbie Bro   [facility]    [address]  [facility] [address]   [phone]              MyChart Information     [employer] gives you secure access to your electronic health record. If you see a primary care provider, you can also send messages to your care team and make appointments. If you have questions, please call your primary care clinic.  If you do not have a primary care provider, please call [phone] and they will assist you.        Care EveryWhere ID     This is your Care EveryWhere ID. This could be used by other organizations to access your [place] medical records  [id number]        Equal Access to Services     SPEEDY ESQUEDA : Tonio Moralez, waaxda luqadaha, qaybta kaalmada cristiano, stephanie goodman. So [facility] [phone].    ATENCIÓN: Si habla español, tiene a villasenor disposición servicios gratuitos de asistencia lingüística. Llame al [phone].    We comply with applicable federal civil rights laws and [place] laws. We do not discriminate on the basis of race, color, national origin, age, disability, sex, sexual orientation, or gender identity.

## 2018-01-13 ASSESSMENT — ANXIETY QUESTIONNAIRES: GAD7 TOTAL SCORE: 7

## 2018-01-25 ENCOUNTER — OFFICE VISIT (OUTPATIENT)
Dept: PSYCHOLOGY | Facility: CLINIC | Age: 48
End: 2018-01-25
Payer: COMMERCIAL

## 2018-01-25 DIAGNOSIS — F41.1 GENERALIZED ANXIETY DISORDER: Primary | ICD-10-CM

## 2018-01-25 DIAGNOSIS — F40.10 SOCIAL ANXIETY DISORDER: ICD-10-CM

## 2018-01-25 DIAGNOSIS — F33.0 MILD EPISODE OF RECURRENT MAJOR DEPRESSIVE DISORDER (H): ICD-10-CM

## 2018-01-25 PROCEDURE — 90834 PSYTX W PT 45 MINUTES: CPT | Performed by: MARRIAGE & FAMILY THERAPIST

## 2018-01-25 NOTE — MR AVS SNAPSHOT
MRN:[id number]                      After Visit Summary   1/25/2018    Antonio Ford    MRN: [id number]           Visit Information        Provider Department      1/25/2018 5:00 PM Debbie Bro [facility] Generic      Your next 10 appointments already scheduled     Feb 05, 2018  5:00 PM CST   Return Visit with Debbie Hubbardlure   [facility] ([facility])    [address]  [facility] [address]   [phone]            Feb 23, 2018  8:00 AM CST   Return Visit with Debbie Hubbardlure   [facility] ([facility])    [address]  [facility] [address]   [phone]            Mar 08, 2018  5:00 PM CST   Return Visit with Debbie Bro   [facility] ([facility])    [address]  [facility] [address]   [phone]              MyChart Information     [employer] gives you secure access to your electronic health record. If you see a primary care provider, you can also send messages to your care team and make appointments. If you have questions, please call your primary care clinic.  If you do not have a primary care provider, please call [phone] and they will assist you.        Care EveryWhere ID     This is your Care EveryWhere ID. This could be used by other organizations to access your [place] medical records  [id number]        Equal Access to Services     SPEEDY ESQUEDA : Hadii ernestina roweo Sobrissa, waaxda luqadaha, qaybta kaalmada adeegyada, waxay luis vásquez danisesteban goodman. So [facility] [phone].    ATENCIÓN: Si habla español, tiene a villasenor disposición servicios gratuitos de asistencia lingüística. Llame al [phone].    We comply with applicable federal civil rights laws and [place] laws. We do not discriminate on the basis of race, color, national origin, age, disability, sex, sexual orientation, or gender  identity.

## 2018-01-25 NOTE — PROGRESS NOTES
Progress Note    Client Name: Antonio Ford  Date: 1/25/2018       Service Type: Individual      Session Start Time: 5pm  Session End Time: 5:45pm   Session Length: 45 - 50     Session #: 99     Attendees: Client attended alone      PHQ-9 / FRANCISCO JAVIER-7 Due Date:  Completed last visit  DATA    Treatment Objective(s) Addressed in This Session:  identify at least 1-2 triggers for anxiety  Client will share thoughts, feelings, and wants with others- advocate for himself      Progress on / Status of Treatment Objective(s) / Homework:  Satisfactory progress - ACTION (Actively working towards change); Intervened by reinforcing change plan / affirming steps taken continues to work on reducing anxiety, working, dating    Intervention:   CBT, solution focused   Client reports he is settling in his position at [employer], and is enjoying his new position.  He wishes that his salary was higher, but knows that this may change over time.  Processed anxiety related to upcoming child support payment, tax season, and change of living situation for son to his house full-time.  Encouraged client to advocate for himself with ex-wife, use the child support calculator to calculate his child support, and take his tax write offs.  Reports relationship with girlfriend continues to go well, and they have started dreaming about houses.  Reports 17-year-old son is completely moved into his house, attends school 1 day a week with the remainder of his courses online, and works the rest of the week.  Client reports his girls are doing well.  Client reports he is sleeping better.    90-day Treatment Plan review completed: yes    Current Stressors / Issues:  family, financial, social,medical  Medication Review:  No changes to current psychiatric medication(s)   Current Outpatient Prescriptions   Medication     amitriptyline (ELAVIL) 50 MG tablet     escitalopram (LEXAPRO) 10 MG tablet     fexofenadine (ALLEGRA)  180 MG tablet     DULoxetine (CYMBALTA) 30 MG EC capsule     gabapentin (NEURONTIN) 300 MG capsule     No current facility-administered medications for this visit.          Medication Compliance:  Yes    Changes in Health Issues:   None reported    Chemical Use Review:   Substance Use: Chemical use reviewed, no active concerns identified      Tobacco Use: No current tobacco use.        ASSESSMENT: Current Emotional / Mental Status (status of significant symptoms):    Risk status (Self / Other harm or suicidal ideation)  Client denies current fears or concerns for personal safety.  Client denies current or recent suicidal ideation or behaviors.  Client denies current or recent homicidal ideation or behaviors.  Client denies current or recent self injurious behavior or ideation.  Client denies other safety concerns.  A safety and risk management plan has not been developed at this time, however client was given the after-hours number should there be a change in any of these risk factors.    Appearance:   Appropriate   Eye Contact:   Good   Psychomotor Behavior: Normal   Attitude:   Cooperative   Orientation:   All  Speech   Rate / Production: Normal    Volume:  Normal   Mood:    Anxious   Affect:    Appropriate   Thought Content:  Clear   Thought Form:  Coherent  Logical   Insight:    Fair     Collateral Reports Completed:  Not Applicable    PLAN: (Homework, other)  Client will advocate for himself.  Consider use of a happy light.                                                    ________________________________________________________________________    Treatment Plan    Client's Name: Antonio Ford  YOB: 1970    Date: 8/20/2014    DSM-IV Diagnoses    AXIS I: 300.02(F41.1) FRANCISCO JAVIER  300.23(F40.10) Social Anxiety Disorder   F33.0 MDD recurrent Mild  Referral / Collaboration:  Collaboration was initiated with PCP MD.    Anticipated number of session or this episode of care: 75-80      MeasurableTreatment  Goal(s) related to diagnosis / functional impairment(s)  Goal 1:Depression and anxiety: Client will decrease depressed mood and anxiety as evidenced by PHQ9 and GAD7 scores 4 and Under in the next 6 months. scoring 9/1/2015:  GAD7: 14. 1/10/2017: PHQ9:4, GAD7:3, 5/31/17:PHQ9:7, GAD7:8, 11/14/2017: PHQ9:5, GAD7:3, 1/12/2018: PHQ9:6, GAD7:7- increase due to new job     I will know I've met my goal when I'm feeling less depressed and anxious, and happier in my relationship/my marriage is improving.    Objective #A (Client Action)  Status: Continued - Date(s):1/25/2018  Client will identify 1-2 initial signs or symptoms of anxiety and utilize anxiety reduction techniques to decrease anxiety. Client will ID triggers for depression and anxiety and work towards decreasing reactivity to or eliminating stressor if possible. client will develop more effective coping skills to manage depression and anxiety symptoms, will develop healthy cognitive patterns and beliefs, will increase ability to function adaptively and will continue to take medications as prescribed / participate in supportive activities. Client will improve communication and assertiveness skills and learn to set boundaries with others.    Intervention(s)   Therapist will teach client how to ID body cues for anxiety, anxiety reduction techniques, how to ID triggers for depression and anxiety- decrease reactivity/eliminate, lifestyle changes to reduce depression and anxiety, communication skills, explore cognitive beliefs and help client to develop healthy cognitive patterns and beliefs. Grief counseling.       Client has reviewed and agreed to the above plan.      Debbie Schreiber

## 2018-02-03 DIAGNOSIS — F41.1 GENERALIZED ANXIETY DISORDER: ICD-10-CM

## 2018-02-03 DIAGNOSIS — F40.10 SOCIAL ANXIETY DISORDER: ICD-10-CM

## 2018-02-05 ENCOUNTER — OFFICE VISIT (OUTPATIENT)
Dept: PSYCHOLOGY | Facility: CLINIC | Age: 48
End: 2018-02-05
Payer: COMMERCIAL

## 2018-02-05 DIAGNOSIS — F41.1 GENERALIZED ANXIETY DISORDER: Primary | ICD-10-CM

## 2018-02-05 DIAGNOSIS — F40.10 SOCIAL ANXIETY DISORDER: ICD-10-CM

## 2018-02-05 DIAGNOSIS — F33.0 MILD EPISODE OF RECURRENT MAJOR DEPRESSIVE DISORDER (H): ICD-10-CM

## 2018-02-05 PROCEDURE — 90834 PSYTX W PT 45 MINUTES: CPT | Performed by: MARRIAGE & FAMILY THERAPIST

## 2018-02-05 NOTE — PROGRESS NOTES
Progress Note    Client Name: Antonio Ford  Date: 2/5/2018       Service Type: Individual      Session Start Time: 5pm  Session End Time: 5:45pm   Session Length: 45 - 50     Session #: 100     Attendees: Client attended alone      PHQ-9 / FRANCISCO JAVIER-7 Due Date:  Declined, last complete 1/6/18  DATA    Treatment Objective(s) Addressed in This Session:  identify at least 1-2 triggers for anxiety  Client will share thoughts, feelings, and wants with others  Client will set and maintain boundaries with adult son    Progress on / Status of Treatment Objective(s) / Homework:  Satisfactory progress - ACTION (Actively working towards change); Intervened by reinforcing change plan / affirming steps taken continues to work on reducing anxiety, working, dating    Intervention:   CBT, solution focused   Client reports he was able to speak with ex-wife about his child support and upcoming tax season which did not go over well.  Client decided that as there is only 3 more months left for son in high school, he plans to pay the additional $59 for son to reduce tension.  Encouraged client to prepare his taxes so he can claim the 2 children he is entitled to in their divorce degree.  Client reports he continues to enjoy his position at [email or web address], however finds the job pace next dictation stressful.  Reports ongoing stress related to girlfriend catering to 17-year-old daughter's request to not meet client.  Processed related emotions, and encouraged client to consider writing girlfriends daughter a letter introducing himself, and letting her know that he will give her time and when she is ready he can meet her.  Client reports 17-year-old son living with him full-time overall is going well, but does report an incident in which his son brought himself off client's [employer].  Client returned this product and set the boundary with son that he will not have drug paraphernalia in his house, and has  changed his password for his [employer].    90-day Treatment Plan review completed: yes    Current Stressors / Issues:  family, financial, social,medical  Medication Review:  No changes to current psychiatric medication(s)   Current Outpatient Prescriptions   Medication     amitriptyline (ELAVIL) 50 MG tablet     escitalopram (LEXAPRO) 10 MG tablet     fexofenadine (ALLEGRA) 180 MG tablet     DULoxetine (CYMBALTA) 30 MG EC capsule     gabapentin (NEURONTIN) 300 MG capsule     No current facility-administered medications for this visit.          Medication Compliance:  Yes    Changes in Health Issues:   None reported    Chemical Use Review:   Substance Use: Chemical use reviewed, no active concerns identified      Tobacco Use: No current tobacco use.        ASSESSMENT: Current Emotional / Mental Status (status of significant symptoms):    Risk status (Self / Other harm or suicidal ideation)  Client denies current fears or concerns for personal safety.  Client denies current or recent suicidal ideation or behaviors.  Client denies current or recent homicidal ideation or behaviors.  Client denies current or recent self injurious behavior or ideation.  Client denies other safety concerns.  A safety and risk management plan has not been developed at this time, however client was given the after-hours number should there be a change in any of these risk factors.    Appearance:   Appropriate   Eye Contact:   Good   Psychomotor Behavior: Normal   Attitude:   Cooperative   Orientation:   All  Speech   Rate / Production: Normal    Volume:  Normal   Mood:    Anxious   Affect:    Appropriate   Thought Content:  Clear   Thought Form:  Coherent  Logical   Insight:    Fair     Collateral Reports Completed:  Not Applicable    PLAN: (Homework, other)  Client will maintain boundaries with son.  Consider writing girlfriend's daughter a note to increase her ability to trust client.                                                    ________________________________________________________________________    Treatment Plan    Client's Name: Antonio Ford  YOB: 1970    Date: 8/20/2014    DSM-IV Diagnoses    AXIS I: 300.02(F41.1) FRANCISCO JAVIER  300.23(F40.10) Social Anxiety Disorder   F33.0 MDD recurrent Mild  Referral / Collaboration:  Collaboration was initiated with PCP MD.    Anticipated number of session or this episode of care: 75-80      MeasurableTreatment Goal(s) related to diagnosis / functional impairment(s)  Goal 1:Depression and anxiety: Client will decrease depressed mood and anxiety as evidenced by PHQ9 and GAD7 scores 4 and Under in the next 6 months. scoring 9/1/2015:  GAD7: 14. 1/10/2017: PHQ9:4, GAD7:3, 5/31/17:PHQ9:7, GAD7:8, 11/14/2017: PHQ9:5, GAD7:3, 1/12/2018: PHQ9:6, GAD7:7- increase due to new job     I will know I've met my goal when I'm feeling less depressed and anxious, and happier in my relationship/my marriage is improving.    Objective #A (Client Action)  Status: Continued - Date(s):2/5/2018  Client will identify 1-2 initial signs or symptoms of anxiety and utilize anxiety reduction techniques to decrease anxiety. Client will ID triggers for depression and anxiety and work towards decreasing reactivity to or eliminating stressor if possible. client will develop more effective coping skills to manage depression and anxiety symptoms, will develop healthy cognitive patterns and beliefs, will increase ability to function adaptively and will continue to take medications as prescribed / participate in supportive activities. Client will improve communication and assertiveness skills and learn to set boundaries with others.    Intervention(s)   Therapist will teach client how to ID body cues for anxiety, anxiety reduction techniques, how to ID triggers for depression and anxiety- decrease reactivity/eliminate, lifestyle changes to reduce depression and anxiety, communication skills, explore cognitive beliefs and  help client to develop healthy cognitive patterns and beliefs. Grief counseling.       Client has reviewed and agreed to the above plan.      Debbie Schreiber

## 2018-02-05 NOTE — MR AVS SNAPSHOT
MRN:[id number]                      After Visit Summary   2/5/2018    Antonio Ford    MRN: [id number]           Visit Information        Provider Department      2/5/2018 5:00 PM Debbie Bro [facility] Generic      Your next 10 appointments already scheduled     Feb 23, 2018  8:00 AM CST   Return Visit with Debbie Hubbardlure   [facility] ([facility])    [address]  [facility] [address]   [phone]            Mar 08, 2018  5:00 PM CST   Return Visit with Debbie ERIC HubbardBro   [facility] ([facility])    [address]  [facility] [address]   [phone]            Mar 29, 2018  5:00 PM CDT   Return Visit with Debbie ERIC Bro   [facility] ([facility])    [address]  [facility] [address]   [phone]              MyChart Information     [employer] gives you secure access to your electronic health record. If you see a primary care provider, you can also send messages to your care team and make appointments. If you have questions, please call your primary care clinic.  If you do not have a primary care provider, please call [phone] and they will assist you.        Care EveryWhere ID     This is your Care EveryWhere ID. This could be used by other organizations to access your [place] medical records  [id number]        Equal Access to Services     SPEEDY ESQUEDA : Hadii ernestina roweo Sobrissa, waaxda luqadaha, qaybta kaalmada adeegyada, waxay luis vásquez danisesteban goodman. So [facility] [phone].    ATENCIÓN: Si habla español, tiene a villasenor disposición servicios gratuitos de asistencia lingüística. Llame al [phone].    We comply with applicable federal civil rights laws and [place] laws. We do not discriminate on the basis of race, color, national origin, age, disability, sex, sexual orientation, or gender  identity.

## 2018-02-06 RX ORDER — DULOXETIN HYDROCHLORIDE 30 MG/1
CAPSULE, DELAYED RELEASE ORAL
Qty: 60 CAPSULE | Refills: 0 | Status: SHIPPED | OUTPATIENT
Start: 2018-02-06 | End: 2018-02-23

## 2018-02-06 NOTE — TELEPHONE ENCOUNTER
Cymbalta:  Routing refill request to provider for review/approval because:  Appears to be a break in medication - should have been out around 12/30/17    Vickie Enamorado, RN, BSN

## 2018-02-19 NOTE — PROGRESS NOTES
SUBJECTIVE:   Antonio Ford is a 47 year old male who presents to clinic today for the following health issues:  History of Present Illness     Depression & Anxiety Follow-up:     Depression/Anxiety:  Anxiety only    Status since last visit::  Improved    Other associated symptoms of anxiety::  None    Significant life event::  YES    Current substance use::  None    Depression symptoms::  None       Today's PHQ-9         PHQ-9 Total Score:     6   PHQ-9 Q9 Suicidal ideation:   Not at all   Thoughts of suicide or self harm:      Self-harm Plan:        Self-harm Action:          Safety concerns for self or others:       FRANCISCO JAVIER-7 Total Score: 7       He stopped the Lexapro after it was prescribed at his last visit as it was causing worsening depression. He states his anxiety and depression have been stable on his current medication regimen as well as stable chronic groin pain. He denies any thoughts of self harm.     Answers for HPI/ROS submitted by the patient on 2/20/2018   If you checked off any problems, how difficult have these problems made it for you to do your work, take care of things at home, or get along with other people?: Somewhat difficult  PHQ9 TOTAL SCORE: 6  FRANCISCO JAVIER 7 TOTAL SCORE: 7    Problem list and histories reviewed & adjusted, as indicated.  Additional history: none    ROS:  GENERAL: Denies fever, fatigue, weakness, weight gain, or weight loss.  CARDIOVASCULAR: Denies chest pain, shortness of breath, irregular heartbeats, palpitations, or edema.  RESPIRATORY: Denies cough, hemoptysis, and shortness of breath.  MUSCULOSKELETAL: +Stable chronic groin pain.  NEUROLOGIC: Denies headache, fainting, dizziness, memory loss, numbness, tingling, or seizures.  PSYCHIATRIC: +Stable anxiety and depression. He mood swings and thoughts of suicide    OBJECTIVE:     /78 (BP Location: Right arm, Patient Position: Chair, Cuff Size: Adult Regular)  Pulse 82  Temp 97.6  F (36.4  C) (Temporal)  Resp 14  Ht 5'  "11.26\" (1.81 m)  Wt 250 lb (113.4 kg)  SpO2 96%  BMI 34.61 kg/m2  Body mass index is 34.61 kg/(m^2).  GENERAL: healthy, alert and no distress  RESP: lungs clear to auscultation - no rales, rhonchi or wheezes  CV: regular rate and rhythm, normal S1 S2, no S3 or S4, no murmur, click or rub  PSYCH: mentation appears normal, affect normal/bright    ASSESSMENT/PLAN:       ICD-10-CM    1. Mild episode of recurrent major depressive disorder (H) F33.0 DEPRESSION ACTION PLAN (DAP)     DULoxetine (CYMBALTA) 30 MG EC capsule   2. Generalized anxiety disorder F41.1 DULoxetine (CYMBALTA) 30 MG EC capsule   3. Social anxiety disorder F40.10 DULoxetine (CYMBALTA) 30 MG EC capsule   4. Penile pain N48.89 amitriptyline (ELAVIL) 50 MG tablet       1-3. His anxiety and depression have been under good control so will continue with current medications. If symptoms worsen, he will let me know. I recommend follow up in 6 months for an annual physical.    4. Stable on gabapentin and Elavil.       Josh Morales PA-C  [facility]"

## 2018-02-20 ENCOUNTER — MYC MEDICAL ADVICE (OUTPATIENT)
Dept: FAMILY MEDICINE | Facility: OTHER | Age: 48
End: 2018-02-20

## 2018-02-20 DIAGNOSIS — N48.89 PENILE PAIN: ICD-10-CM

## 2018-02-20 RX ORDER — GABAPENTIN 300 MG/1
900 CAPSULE ORAL 3 TIMES DAILY
Qty: 270 CAPSULE | Refills: 11 | Status: SHIPPED | OUTPATIENT
Start: 2018-02-20 | End: 2018-09-20

## 2018-02-20 ASSESSMENT — ANXIETY QUESTIONNAIRES
2. NOT BEING ABLE TO STOP OR CONTROL WORRYING: SEVERAL DAYS
GAD7 TOTAL SCORE: 7
1. FEELING NERVOUS, ANXIOUS, OR ON EDGE: MORE THAN HALF THE DAYS
5. BEING SO RESTLESS THAT IT IS HARD TO SIT STILL: NOT AT ALL
7. FEELING AFRAID AS IF SOMETHING AWFUL MIGHT HAPPEN: SEVERAL DAYS
7. FEELING AFRAID AS IF SOMETHING AWFUL MIGHT HAPPEN: SEVERAL DAYS
6. BECOMING EASILY ANNOYED OR IRRITABLE: SEVERAL DAYS
3. WORRYING TOO MUCH ABOUT DIFFERENT THINGS: SEVERAL DAYS
4. TROUBLE RELAXING: SEVERAL DAYS

## 2018-02-20 ASSESSMENT — PATIENT HEALTH QUESTIONNAIRE - PHQ9
SUM OF ALL RESPONSES TO PHQ QUESTIONS 1-9: 6
SUM OF ALL RESPONSES TO PHQ QUESTIONS 1-9: 6
10. IF YOU CHECKED OFF ANY PROBLEMS, HOW DIFFICULT HAVE THESE PROBLEMS MADE IT FOR YOU TO DO YOUR WORK, TAKE CARE OF THINGS AT HOME, OR GET ALONG WITH OTHER PEOPLE: SOMEWHAT DIFFICULT

## 2018-02-21 ASSESSMENT — ANXIETY QUESTIONNAIRES: GAD7 TOTAL SCORE: 7

## 2018-02-21 ASSESSMENT — PATIENT HEALTH QUESTIONNAIRE - PHQ9: SUM OF ALL RESPONSES TO PHQ QUESTIONS 1-9: 6

## 2018-02-23 ENCOUNTER — OFFICE VISIT (OUTPATIENT)
Dept: FAMILY MEDICINE | Facility: OTHER | Age: 48
End: 2018-02-23

## 2018-02-23 ENCOUNTER — OFFICE VISIT (OUTPATIENT)
Dept: PSYCHOLOGY | Facility: CLINIC | Age: 48
End: 2018-02-23
Payer: COMMERCIAL

## 2018-02-23 VITALS
RESPIRATION RATE: 14 BRPM | HEIGHT: 71 IN | SYSTOLIC BLOOD PRESSURE: 120 MMHG | WEIGHT: 250 LBS | OXYGEN SATURATION: 96 % | DIASTOLIC BLOOD PRESSURE: 78 MMHG | HEART RATE: 82 BPM | TEMPERATURE: 97.6 F | BODY MASS INDEX: 35 KG/M2

## 2018-02-23 DIAGNOSIS — F40.10 SOCIAL ANXIETY DISORDER: ICD-10-CM

## 2018-02-23 DIAGNOSIS — N48.89 PENILE PAIN: ICD-10-CM

## 2018-02-23 DIAGNOSIS — F33.0 MILD EPISODE OF RECURRENT MAJOR DEPRESSIVE DISORDER (H): Primary | ICD-10-CM

## 2018-02-23 DIAGNOSIS — F41.1 GENERALIZED ANXIETY DISORDER: Primary | ICD-10-CM

## 2018-02-23 DIAGNOSIS — F41.1 GENERALIZED ANXIETY DISORDER: ICD-10-CM

## 2018-02-23 PROCEDURE — 99214 OFFICE O/P EST MOD 30 MIN: CPT | Performed by: PHYSICIAN ASSISTANT

## 2018-02-23 PROCEDURE — 90834 PSYTX W PT 45 MINUTES: CPT | Performed by: MARRIAGE & FAMILY THERAPIST

## 2018-02-23 RX ORDER — AMITRIPTYLINE HYDROCHLORIDE 50 MG/1
50 TABLET ORAL AT BEDTIME
Qty: 90 TABLET | Refills: 1 | Status: SHIPPED | OUTPATIENT
Start: 2018-02-23 | End: 2018-09-02

## 2018-02-23 RX ORDER — DULOXETIN HYDROCHLORIDE 30 MG/1
30 CAPSULE, DELAYED RELEASE ORAL 2 TIMES DAILY
Qty: 180 CAPSULE | Refills: 1 | Status: SHIPPED | OUTPATIENT
Start: 2018-02-23 | End: 2018-09-04

## 2018-02-23 NOTE — PATIENT INSTRUCTIONS
Will refill your medications for 6 months and then recommend follow up in 6 months for an annual physical.

## 2018-02-23 NOTE — PROGRESS NOTES
Progress Note    Client Name: Antonio Ford  Date: 2/23/2018       Service Type: Individual      Session Start Time: 8am  Session End Time: 8:45am   Session Length: 45 - 50     Session #: 101     Attendees: Client attended alone      PHQ-9 / FRANCISCO JAVIER-7 Due Date:  Completed 2/20/18  DATA    Treatment Objective(s) Addressed in This Session:  identify at least 1-2 triggers for anxiety  Client will share thoughts, feelings, and wants with others      Progress on / Status of Treatment Objective(s) / Homework:  Satisfactory progress - ACTION (Actively working towards change); Intervened by reinforcing change plan / affirming steps taken continues to work on reducing anxiety, working, dating    Intervention:   CBT, solution focused   Client reports relationships with kids continue to go well, and was able to talk with son about upcoming graduation.  Reports some recent conflict with ex-wife regarding finances and taxes.  Continues to enjoy job and coworkers, however does report some anxiety related to boss's management style and criticism at times.  Encouraged client to talk to boss if needed about wanting to receive feedback privately.  Client reports continued strain and present dating relationship with girlfriends daughter continuing to refuse to meet client or be in his vicinity when he visits.  Client reports in the past week he has been having some second thoughts about the relationship and whether it may work.  Reports he has also been in contact with his friend Myesha, whom he has a good friendship connection with and communication is easy.  Reports Myesha has moved to working day shift, and has resolved many of the interpersonal issues she was having when they had tried dating in the past.  Discussed whether recent contact with Myesha may have started his questioning of present relationship.  Encouraged client to consider his feelings related to relationship, and what he would  like to do moving forward.      90-day Treatment Plan review completed: yes    Current Stressors / Issues:  family, financial, social,medical  Medication Review:  No changes to current psychiatric medication(s)   Current Outpatient Prescriptions   Medication     gabapentin (NEURONTIN) 300 MG capsule     DULoxetine (CYMBALTA) 30 MG EC capsule     amitriptyline (ELAVIL) 50 MG tablet     escitalopram (LEXAPRO) 10 MG tablet     fexofenadine (ALLEGRA) 180 MG tablet     No current facility-administered medications for this visit.          Medication Compliance:  Yes    Changes in Health Issues:   None reported    Chemical Use Review:   Substance Use: Chemical use reviewed, no active concerns identified      Tobacco Use: No current tobacco use.        ASSESSMENT: Current Emotional / Mental Status (status of significant symptoms):    Risk status (Self / Other harm or suicidal ideation)  Client denies current fears or concerns for personal safety.  Client denies current or recent suicidal ideation or behaviors.  Client denies current or recent homicidal ideation or behaviors.  Client denies current or recent self injurious behavior or ideation.  Client denies other safety concerns.  A safety and risk management plan has not been developed at this time, however client was given the after-hours number should there be a change in any of these risk factors.    Appearance:   Appropriate   Eye Contact:   Good   Psychomotor Behavior: Normal   Attitude:   Cooperative   Orientation:   All  Speech   Rate / Production: Normal    Volume:  Normal   Mood:    Anxious   Affect:    Appropriate   Thought Content:  Clear   Thought Form:  Coherent  Logical   Insight:    Fair     Collateral Reports Completed:  Not Applicable    PLAN: (Homework, other)   Encouraged client to consider his feelings related to relationship, and what he would like to do moving forward.                                                      ________________________________________________________________________    Treatment Plan    Client's Name: Antonio Ford  YOB: 1970    Date: 8/20/2014    DSM-IV Diagnoses    AXIS I: 300.02(F41.1) FRANCISCO JAVIER  300.23(F40.10) Social Anxiety Disorder   F33.0 MDD recurrent Mild  Referral / Collaboration:  Collaboration was initiated with PCP MD.    Anticipated number of session or this episode of care: 75-80      MeasurableTreatment Goal(s) related to diagnosis / functional impairment(s)  Goal 1:Depression and anxiety: Client will decrease depressed mood and anxiety as evidenced by PHQ9 and GAD7 scores 4 and Under in the next 6 months. scoring 9/1/2015:  GAD7: 14. 1/10/2017: PHQ9:4, GAD7:3, 5/31/17:PHQ9:7, GAD7:8, 11/14/2017: PHQ9:5, GAD7:3, 1/12/2018: PHQ9:6, GAD7:7- increase due to new job     I will know I've met my goal when I'm feeling less depressed and anxious, and happier in my relationship/my marriage is improving.    Objective #A (Client Action)  Status: Continued - Date(s):2/23/2018  Client will identify 1-2 initial signs or symptoms of anxiety and utilize anxiety reduction techniques to decrease anxiety. Client will ID triggers for depression and anxiety and work towards decreasing reactivity to or eliminating stressor if possible. client will develop more effective coping skills to manage depression and anxiety symptoms, will develop healthy cognitive patterns and beliefs, will increase ability to function adaptively and will continue to take medications as prescribed / participate in supportive activities. Client will improve communication and assertiveness skills and learn to set boundaries with others.    Intervention(s)   Therapist will teach client how to ID body cues for anxiety, anxiety reduction techniques, how to ID triggers for depression and anxiety- decrease reactivity/eliminate, lifestyle changes to reduce depression and anxiety, communication skills, explore cognitive beliefs and  help client to develop healthy cognitive patterns and beliefs. Grief counseling.       Client has reviewed and agreed to the above plan.      Debbie Schreiber

## 2018-02-23 NOTE — MR AVS SNAPSHOT
MRN:[id number]                      After Visit Summary   2/23/2018    Antonio Ford    MRN: [id number]           Visit Information        Provider Department      2/23/2018 8:00 AM Debbie Bro [facility] Generic      Your next 10 appointments already scheduled     Mar 08, 2018  5:00 PM CST   Return Visit with Debbie ERIC HubbardBro   [facility] ([facility])    [address]  [facility] [address] [phone]            Mar 29, 2018  5:00 PM CDT   Return Visit with Debbie ERIC HubbardBro   [facility] ([facility])    [address]  [facility] [address] [phone]              MyChart Information     [employer] gives you secure access to your electronic health record. If you see a primary care provider, you can also send messages to your care team and make appointments. If you have questions, please call your primary care clinic.  If you do not have a primary care provider, please call [phone] and they will assist you.        Care EveryWhere ID     This is your Care EveryWhere ID. This could be used by other organizations to access your [place] medical records  [id number]        Equal Access to Services     SPEEDY ESQUEDA : Tonio Moralez, waaxda luqadaha, qaybta kaalmada cristiano, stephanie goodman. So [facility] [phone].    ATENCIÓN: Si habla español, tiene a villasenor disposición servicios gratuitos de asistencia lingüística. Llame al [phone].    We comply with applicable federal civil rights laws and [place] laws. We do not discriminate on the basis of race, color, national origin, age, disability, sex, sexual orientation, or gender identity.

## 2018-02-23 NOTE — NURSING NOTE
"Chief Complaint   Patient presents with     Recheck Medication     Panel Management     flu, height, hailee, dap       Initial /78 (BP Location: Right arm, Patient Position: Chair, Cuff Size: Adult Regular)  Pulse 82  Temp 97.6  F (36.4  C) (Temporal)  Resp 14  Ht 5' 11.26\" (1.81 m)  Wt 250 lb (113.4 kg)  SpO2 96%  BMI 34.61 kg/m2 Estimated body mass index is 34.61 kg/(m^2) as calculated from the following:    Height as of this encounter: 5' 11.26\" (1.81 m).    Weight as of this encounter: 250 lb (113.4 kg).  Medication Reconciliation: complete     Tammy Marin CMA      "

## 2018-02-23 NOTE — LETTER
My Depression Action Plan  Name: Antonio Ford   Date of Birth 1970  Date: 2/19/2018    My doctor: Josh Morales   My clinic: [address]  [facility] [address] [phone]          GREEN    ZONE   Good Control    What it looks like:     Things are going generally well. You have normal up s and down s. You may even feel depressed from time to time, but bad moods usually last less than a day.   What you need to do:  1. Continue to care for yourself (see self care plan)  2. Check your depression survival kit and update it as needed  3. Follow your physician s recommendations including any medication.  4. Do not stop taking medication unless you consult with your physician first.           YELLOW         ZONE Getting Worse    What it looks like:     Depression is starting to interfere with your life.     It may be hard to get out of bed; you may be starting to isolate yourself from others.    Symptoms of depression are starting to last most all day and this has happened for several days.     You may have suicidal thoughts but they are not constant.   What you need to do:     1. Call your care team, your response to treatment will improve if you keep your care team informed of your progress. Yellow periods are signs an adjustment may need to be made.     2. Continue your self-care, even if you have to fake it!    3. Talk to someone in your support network    4. Open up your depression survival kit           RED    ZONE Medical Alert - Get Help    What it looks like:     Depression is seriously interfering with your life.     You may experience these or other symptoms: You can t get out of bed most days, can t work or engage in other necessary activities, you have trouble taking care of basic hygiene, or basic responsibilities, thoughts of suicide or death that will not go away, self-injurious behavior.     What you need to do:  1. Call your care team  and request a same-day appointment. If they are not available (weekends or after hours) call your local crisis line, emergency room or 911.      Electronically signed by: Ioana Manrique, February 19, 2018    Depression Self Care Plan / Survival Kit    Self-Care for Depression  Here s the deal. Your body and mind are really not as separate as most people think.  What you do and think affects how you feel and how you feel influences what you do and think. This means if you do things that people who feel good do, it will help you feel better.  Sometimes this is all it takes.  There is also a place for medication and therapy depending on how severe your depression is, so be sure to consult with your medical provider and/ or Behavioral Health Consultant if your symptoms are worsening or not improving.     In order to better manage my stress, I will:    Exercise  Get some form of exercise, every day. This will help reduce pain and release endorphins, the  feel good  chemicals in your brain. This is almost as good as taking antidepressants!  This is not the same as joining a gym and then never going! (they count on that by the way ) It can be as simple as just going for a walk or doing some gardening, anything that will get you moving.      Hygiene   Maintain good hygiene (Get out of bed in the morning, Make your bed, Brush your teeth, Take a shower, and Get dressed like you were going to work, even if you are unemployed).  If your clothes don't fit try to get ones that do.    Diet  I will strive to eat foods that are good for me, drink plenty of water, and avoid excessive sugar, caffeine, alcohol, and other mood-altering substances.  Some foods that are helpful in depression are: complex carbohydrates, B vitamins, flaxseed, fish or fish oil, fresh fruits and vegetables.    Psychotherapy  I agree to participate in Individual Therapy (if recommended).    Medication  If prescribed medications, I agree to take them.   Missing doses can result in serious side effects.  I understand that drinking alcohol, or other illicit drug use, may cause potential side effects.  I will not stop my medication abruptly without first discussing it with my provider.    Staying Connected With Others  I will stay in touch with my friends, family members, and my primary care provider/team.    Use your imagination  Be creative.  We all have a creative side; it doesn t matter if it s oil painting, sand castles, or mud pies! This will also kick up the endorphins.    Witness Beauty  (AKA stop and smell the roses) Take a look outside, even in mid-winter. Notice colors, textures. Watch the squirrels and birds.     Service to others  Be of service to others.  There is always someone else in need.  By helping others we can  get out of ourselves  and remember the really important things.  This also provides opportunities for practicing all the other parts of the program.    Humor  Laugh and be silly!  Adjust your TV habits for less news and crime-drama and more comedy.    Control your stress  Try breathing deep, massage therapy, biofeedback, and meditation. Find time to relax each day.     My support system    Clinic Contact:  Phone number:    Contact 1:  Phone number:    Contact 2:  Phone number:    [facility]/:  Phone number:    Therapist:  Phone number:    Local crisis center:    Phone number:    Other community support:  Phone number:

## 2018-02-23 NOTE — MR AVS SNAPSHOT
After Visit Summary   2/23/2018    Antonio Ford    MRN: [id number]           Patient Information     Date Of Birth          1970        Visit Information        Provider Department      2/23/2018 9:15 AM Josh Morales PA-C [facility]        Today's Diagnoses     Mild episode of recurrent major depressive disorder (H)    -  1    Generalized anxiety disorder        Social anxiety disorder        Penile pain          Care Instructions    Will refill your medications for 6 months and then recommend follow up in 6 months for an annual physical.              Follow-ups after your visit        Your next 10 appointments already scheduled     Mar 08, 2018  5:00 PM CST   Return Visit with Debbie N [facility] ([facility])    [address]  [facility] [id number] [phone]            Mar 29, 2018  5:00 PM CDT   Return Visit with Debbie N [facility] ([facility])    [address]  [facility] [id number] [phone]              Future tests that were ordered for you today     Open Future Orders        Priority Expected Expires Ordered    DEPRESSION ACTION PLAN (DAP) Routine  3/19/2018 2/23/2018            Who to contact     If you have questions or need follow up information about today's clinic visit or your schedule please contact [facility] directly at [phone].  Normal or non-critical lab and imaging results will be communicated to you by MyChart, letter or phone within 4 business days after the clinic has received the results. If you do not hear from us within 7 days, please contact the clinic through MyChart or phone. If you have a critical or abnormal lab result, we will notify you by phone as soon as possible.  Submit refill requests through [employer] or call your pharmacy and they will forward the refill request to us. Please allow 3 business  "days for your refill to be completed.          Additional Information About Your Visit        Apartment Addahart Information     [employer] gives you secure access to your electronic health record. If you see a primary care provider, you can also send messages to your care team and make appointments. If you have questions, please call your primary care clinic.  If you do not have a primary care provider, please call [phone] and they will assist you.        Care EveryWhere ID     This is your Care EveryWhere ID. This could be used by other organizations to access your [place] medical records  [id number]        Your Vitals Were     Pulse Temperature Respirations Height Pulse Oximetry BMI (Body Mass Index)    82 97.6  F (36.4  C) (Temporal) 14 5' 11.26\" (1.81 m) 96% 34.61 kg/m2       Blood Pressure from Last 3 Encounters:   02/23/18 120/78   08/25/17 124/84   06/30/17 122/80    Weight from Last 3 Encounters:   02/23/18 250 lb (113.4 kg)   08/25/17 234 lb (106.1 kg)   06/30/17 225 lb (102.1 kg)                 Today's Medication Changes          These changes are accurate as of 2/23/18  9:34 AM.  If you have any questions, ask your nurse or doctor.               These medicines have changed or have updated prescriptions.        Dose/Directions    DULoxetine 30 MG EC capsule   Commonly known as:  CYMBALTA   This may have changed:  See the new instructions.   Used for:  Generalized anxiety disorder, Social anxiety disorder   Changed by:  Josh Morales PA-C        Dose:  30 mg   Take 1 capsule (30 mg) by mouth 2 times daily   Quantity:  180 capsule   Refills:  1         Stop taking these medicines if you haven't already. Please contact your care team if you have questions.     escitalopram 10 MG tablet   Commonly known as:  LEXAPRO   Stopped by:  Josh Morales PA-C                Where to get your medicines      These medications were sent to [address] [address] [facility]  [address] " [facility] [address]     Phone:  [phone]     amitriptyline 50 MG tablet    DULoxetine 30 MG EC capsule                Primary Care Provider Office Phone # Fax #    Josh Morales PA-C [phone]       [address] [place] [address] [address]  [facility] [address]        Equal Access to Services     SPEEDY ESQUEDA : Hadii aad ku hadasho Soomaali, waaxda luqadaha, qaybta kaalmada adeegyada, waxay pitain hayaan adeesteban jones . So [facility] [phone].    ATENCIÓN: Si habla español, tiene a villasenor disposición servicios gratuitos de asistencia lingüística. [facility] [phone].    We comply with applicable federal civil rights laws and [place] laws. We do not discriminate on the basis of race, color, national origin, age, disability, sex, sexual orientation, or gender identity.            Thank you!     Thank you for choosing [facility] [place] [facility]  for your care. Our goal is always to provide you with excellent care. Hearing back from our patients is one way we can continue to improve our services. Please take a few minutes to complete the written survey that you may receive in the mail after your visit with us. Thank you!             Your Updated Medication List - Protect others around you: Learn how to safely use, store and throw away your medicines at www.disposemymeds.org.          This list is accurate as of 2/23/18  9:34 AM.  Always use your most recent med list.                   Brand Name Dispense Instructions for use Diagnosis    amitriptyline 50 MG tablet    ELAVIL    90 tablet    Take 1 tablet (50 mg) by mouth At Bedtime    Penile pain       DULoxetine 30 MG EC capsule    CYMBALTA    180 capsule    Take 1 capsule (30 mg) by mouth 2 times daily    Generalized anxiety disorder, Social anxiety disorder       fexofenadine 180 MG tablet    ALLEGRA    90 tablet    Take 1 tablet (180 mg) by mouth daily    Chronic seasonal allergic rhinitis due to pollen       gabapentin 300 MG capsule     NEURONTIN    270 capsule    Take 3 capsules (900 mg) by mouth 3 times daily    Penile pain

## 2018-03-29 ENCOUNTER — OFFICE VISIT (OUTPATIENT)
Dept: PSYCHOLOGY | Facility: CLINIC | Age: 48
End: 2018-03-29
Payer: COMMERCIAL

## 2018-03-29 DIAGNOSIS — F41.1 GENERALIZED ANXIETY DISORDER: Primary | ICD-10-CM

## 2018-03-29 DIAGNOSIS — F40.10 SOCIAL ANXIETY DISORDER: ICD-10-CM

## 2018-03-29 PROCEDURE — 90834 PSYTX W PT 45 MINUTES: CPT | Performed by: MARRIAGE & FAMILY THERAPIST

## 2018-03-29 NOTE — MR AVS SNAPSHOT
MRN:[id number]                      After Visit Summary   3/29/2018    Antonio Ford    MRN: [id number]           Visit Information        Provider Department      3/29/2018 5:00 PM Debbie Bro [facility]      Your next 10 appointments already scheduled     Apr 11, 2018  8:00 AM CDT   Return Visit with Debbie Hubbardlure   [facility] ([facility])    [address]  [facility] [address]   [phone]            Apr 24, 2018  8:00 AM CDT   Return Visit with Debbie Hubbardlure   [facility] ([facility])    [address]  [facility] [address]   [phone]            May 10, 2018  4:00 PM CDT   Return Visit with Debbie Bro   [facility] ([facility])    [address]  [facility] [address]   [phone]              MyChart Information     [employer] gives you secure access to your electronic health record. If you see a primary care provider, you can also send messages to your care team and make appointments. If you have questions, please call your primary care clinic.  If you do not have a primary care provider, please call [phone] and they will assist you.        Care EveryWhere ID     This is your Care EveryWhere ID. This could be used by other organizations to access your [place] medical records  [id number]        Equal Access to Services     SPEEDY ESQUEDA : Hadii ernestina roweo Sobrissa, waaxda luqadaha, qaybta kaalmada ademateusz irvingay idiin hayaan danisesteban jones . So [facility] [phone].    ATENCIÓN: Si habla español, tiene a villasenor disposición servicios gratuitos de asistencia lingüística. Llame al [phone].    We comply with applicable federal civil rights laws and [place] laws. We do not discriminate on the basis of race, color, national origin, age, disability, sex, sexual orientation, or gender  identity.
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
## 2018-04-24 ENCOUNTER — OFFICE VISIT (OUTPATIENT)
Dept: PSYCHOLOGY | Facility: CLINIC | Age: 48
End: 2018-04-24
Payer: COMMERCIAL

## 2018-04-24 DIAGNOSIS — F41.1 GENERALIZED ANXIETY DISORDER: Primary | ICD-10-CM

## 2018-04-24 DIAGNOSIS — F40.10 SOCIAL ANXIETY DISORDER: ICD-10-CM

## 2018-04-24 PROCEDURE — 90834 PSYTX W PT 45 MINUTES: CPT | Performed by: MARRIAGE & FAMILY THERAPIST

## 2018-04-24 ASSESSMENT — ANXIETY QUESTIONNAIRES
6. BECOMING EASILY ANNOYED OR IRRITABLE: NOT AT ALL
GAD7 TOTAL SCORE: 3
2. NOT BEING ABLE TO STOP OR CONTROL WORRYING: SEVERAL DAYS
1. FEELING NERVOUS, ANXIOUS, OR ON EDGE: SEVERAL DAYS
IF YOU CHECKED OFF ANY PROBLEMS ON THIS QUESTIONNAIRE, HOW DIFFICULT HAVE THESE PROBLEMS MADE IT FOR YOU TO DO YOUR WORK, TAKE CARE OF THINGS AT HOME, OR GET ALONG WITH OTHER PEOPLE: SOMEWHAT DIFFICULT
7. FEELING AFRAID AS IF SOMETHING AWFUL MIGHT HAPPEN: NOT AT ALL
5. BEING SO RESTLESS THAT IT IS HARD TO SIT STILL: NOT AT ALL
3. WORRYING TOO MUCH ABOUT DIFFERENT THINGS: SEVERAL DAYS

## 2018-04-24 ASSESSMENT — PATIENT HEALTH QUESTIONNAIRE - PHQ9: 5. POOR APPETITE OR OVEREATING: NOT AT ALL
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
## 2018-04-24 NOTE — MR AVS SNAPSHOT
MRN:[id number]                      After Visit Summary   4/24/2018    Antonio Ford    MRN: [id number]           Visit Information        Provider Department      4/24/2018 8:00 AM Debbie Bro [facility]      Your next 10 appointments already scheduled     Jun 01, 2018  8:00 AM CDT   Return Visit with Debbie Bro   [facility] ([facility])    [address]  [facility] [id number] [phone]              MyChart Information     [employer] gives you secure access to your electronic health record. If you see a primary care provider, you can also send messages to your care team and make appointments. If you have questions, please call your primary care clinic.  If you do not have a primary care provider, please call [phone] and they will assist you.        Care EveryWhere ID     This is your Care EveryWhere ID. This could be used by other organizations to access your [place] medical records  [id number]        Equal Access to Services     SPEEDY ESQUEDA AH: Hadii ernestina roweo Sobrissa, waaxda luqadaha, qaybta kaalmada adeegyaattila, stephanie goodman. So [facility] [phone].    ATENCIÓN: Si habla español, tiene a villasenor disposición servicios gratuitos de asistencia lingüística. Llame al [phone].    We comply with applicable federal civil rights laws and [place] laws. We do not discriminate on the basis of race, color, national origin, age, disability, sex, sexual orientation, or gender identity.

## 2018-04-25 ASSESSMENT — ANXIETY QUESTIONNAIRES: GAD7 TOTAL SCORE: 3

## 2018-04-25 ASSESSMENT — PATIENT HEALTH QUESTIONNAIRE - PHQ9: SUM OF ALL RESPONSES TO PHQ QUESTIONS 1-9: 4

## 2018-05-18 ENCOUNTER — OFFICE VISIT (OUTPATIENT)
Dept: PSYCHOLOGY | Facility: CLINIC | Age: 48
End: 2018-05-18
Payer: COMMERCIAL

## 2018-05-18 DIAGNOSIS — F41.1 GENERALIZED ANXIETY DISORDER: Primary | ICD-10-CM

## 2018-05-18 DIAGNOSIS — F40.10 SOCIAL ANXIETY DISORDER: ICD-10-CM

## 2018-05-18 PROCEDURE — 90834 PSYTX W PT 45 MINUTES: CPT | Performed by: MARRIAGE & FAMILY THERAPIST

## 2018-05-18 NOTE — MR AVS SNAPSHOT
MRN:[id number]                      After Visit Summary   5/18/2018    Antonio Ford    MRN: [id number]           Visit Information        Provider Department      5/18/2018 2:00 PM Debbie Bro [facility] Generic      Your next 10 appointments already scheduled     Jun 01, 2018  8:00 AM CDT   Return Visit with Debbie Bro   [facility] ([facility])    [address]  [facility] [address] [phone]            Jun 29, 2018  8:00 AM CDT   Return Visit with Debbie Bro   [facility] ([facility])    [address]  [facility] [address]   [phone]              MyChart Information     [employer] gives you secure access to your electronic health record. If you see a primary care provider, you can also send messages to your care team and make appointments. If you have questions, please call your primary care clinic.  If you do not have a primary care provider, please call [phone] and they will assist you.        Care EveryWhere ID     This is your Care EveryWhere ID. This could be used by other organizations to access your [place] medical records  [id number]        Equal Access to Services     SPEEDY ESQUEDA : Tonio Moralez, waaxda luqadaha, qaybta kaalmada ademaria fernanda, stephanie goodmna. So [facility] [phone].    ATENCIÓN: Si habla español, tiene a villasenor disposición servicios gratuitos de asistencia lingüística. Llmanasa al [phone].    We comply with applicable federal civil rights laws and [place] laws. We do not discriminate on the basis of race, color, national origin, age, disability, sex, sexual orientation, or gender identity.

## 2018-05-18 NOTE — PROGRESS NOTES
Progress Note    Client Name: Antonio Ford  Date: 5/18/2018       Service Type: Individual      Session Start Time: 2:10pm  Session End Time: 3pm   Session Length: 45 - 50     Session #: 104     Attendees: Client attended alone      PHQ-9 / FRANCISCO JAVIER-7 Due Date: completed last visit  DATA    Treatment Objective(s) Addressed in This Session:  identify at least 1-2 triggers for anxiety  Client will share thoughts, feelings, and wants with others      Progress on / Status of Treatment Objective(s) / Homework:  Satisfactory progress - ACTION (Actively working towards change); Intervened by reinforcing change plan / affirming steps taken continues to work on reducing anxiety, working, dating    Intervention:   CBT, solution focused   Relationship with girlfriend continues to go well, they have been dating for 7 months, but girlfriends daughter continues to be adamant about not meeting client. Discussed client considering writing girlfriend's daughter a letter to introduce himself, and let her know he is available to talk when she is ready. Discussed always feeling in relationships like there may be someone better for him. Discussed pros/cons of present relationship and determined that he is satisfied in relationship overall. Encouraged client to consider going back and listening to a recent sermon from his [employer] on Enough and looking at their devotionals to determine what causes his discontentment and how he can work towards being more satisfied. Reports job and relationship with children continues to be positive.     90-day Treatment Plan review completed: yes    Current Stressors / Issues:  family, financial, social,medical  Medication Review:  No changes to current psychiatric medication(s)   Current Outpatient Prescriptions   Medication     amitriptyline (ELAVIL) 50 MG tablet     DULoxetine (CYMBALTA) 30 MG EC capsule     fexofenadine (ALLEGRA) 180 MG tablet     gabapentin  (NEURONTIN) 300 MG capsule     No current facility-administered medications for this visit.          Medication Compliance:  Yes    Changes in Health Issues:   None reported    Chemical Use Review:   Substance Use: Chemical use reviewed, no active concerns identified      Tobacco Use: No current tobacco use.        ASSESSMENT: Current Emotional / Mental Status (status of significant symptoms):    Risk status (Self / Other harm or suicidal ideation)  Client denies current fears or concerns for personal safety.  Client denies current or recent suicidal ideation or behaviors.  Client denies current or recent homicidal ideation or behaviors.  Client denies current or recent self injurious behavior or ideation.  Client denies other safety concerns.  A safety and risk management plan has not been developed at this time, however client was given the after-hours number should there be a change in any of these risk factors.    Appearance:   Appropriate   Eye Contact:   Good   Psychomotor Behavior: Normal   Attitude:   Cooperative   Orientation:   All  Speech   Rate / Production: Normal    Volume:  Normal   Mood:    Anxious   Affect:    Appropriate   Thought Content:  Clear   Thought Form:  Coherent  Logical   Insight:    Fair     Collateral Reports Completed:  Not Applicable    PLAN: (Homework, other)   Encouraged client to consider writing girlfriend's daughter a letter to introduce himself, and let her know he is available to talk when she is ready. Encouraged client to consider going back and listening to a recent sermon from his [employer] on [employer] and looking at their devotionals to determine what causes his discontentment and how he can work towards being more satisfied.                                                      ________________________________________________________________________    Treatment Plan    Client's Name: Antonio Ford  YOB: 1970    Date: 8/20/2014    DSM-IV Diagnoses    AXIS I:  300.02(F41.1) FRANCISCO JAVIER  300.23(F40.10) Social Anxiety Disorder   F33.0 MDD recurrent Mild  Referral / Collaboration:  Collaboration was initiated with PCP MD.    Anticipated number of session or this episode of care: 75-80      MeasurableTreatment Goal(s) related to diagnosis / functional impairment(s)  Goal 1:Depression and anxiety: Client will decrease depressed mood and anxiety as evidenced by PHQ9 and GAD7 scores 4 and Under in the next 6 months. scoring 9/1/2015:  GAD7: 14. 1/10/2017: PHQ9:4, GAD7:3, 5/31/17:PHQ9:7, GAD7:8, 11/14/2017: PHQ9:5, GAD7:3, 1/12/2018: PHQ9:6, GAD7:7, 4/24/2018: PHQ9:4, GAD7:3       I will know I've met my goal when I'm feeling less depressed and anxious, and happier in my relationship/my marriage is improving.    Objective #A (Client Action)  Status: Continued - Date(s):5/18/2018  Client will identify 1-2 initial signs or symptoms of anxiety and utilize anxiety reduction techniques to decrease anxiety. Client will ID triggers for depression and anxiety and work towards decreasing reactivity to or eliminating stressor if possible. client will develop more effective coping skills to manage depression and anxiety symptoms, will develop healthy cognitive patterns and beliefs, will increase ability to function adaptively and will continue to take medications as prescribed / participate in supportive activities. Client will improve communication and assertiveness skills and learn to set boundaries with others.    Intervention(s)   Therapist will teach client how to ID body cues for anxiety, anxiety reduction techniques, how to ID triggers for depression and anxiety- decrease reactivity/eliminate, lifestyle changes to reduce depression and anxiety, communication skills, explore cognitive beliefs and help client to develop healthy cognitive patterns and beliefs. Grief counseling.       Client has reviewed and agreed to the above plan.      Debbie WILCOX  Schreiber

## 2018-06-01 ENCOUNTER — OFFICE VISIT (OUTPATIENT)
Dept: PSYCHOLOGY | Facility: CLINIC | Age: 48
End: 2018-06-01
Payer: COMMERCIAL

## 2018-06-01 DIAGNOSIS — F41.1 GENERALIZED ANXIETY DISORDER: Primary | ICD-10-CM

## 2018-06-01 DIAGNOSIS — F40.10 SOCIAL ANXIETY DISORDER: ICD-10-CM

## 2018-06-01 PROCEDURE — 90834 PSYTX W PT 45 MINUTES: CPT | Performed by: MARRIAGE & FAMILY THERAPIST

## 2018-06-01 NOTE — MR AVS SNAPSHOT
MRN:[id number]                      After Visit Summary   6/1/2018    Antonio Ford    MRN: [id number]           Visit Information        Provider Department      6/1/2018 8:00 AM Debbie Bro [facility] Generic      Your next 10 appointments already scheduled     Jun 29, 2018  8:00 AM CDT   Return Visit with Debbie ERIC HubbardBro   [facility] ([facility])    [address]  [facility] [address] [phone]            Jul 24, 2018  8:00 AM CDT   Return Visit with Debbie Bro   [facility] ([facility])    [address]  [facility] [address]   [phone]              MyChart Information     [employer] gives you secure access to your electronic health record. If you see a primary care provider, you can also send messages to your care team and make appointments. If you have questions, please call your primary care clinic.  If you do not have a primary care provider, please call [phone] and they will assist you.        Care EveryWhere ID     This is your Care EveryWhere ID. This could be used by other organizations to access your [place] medical records  [id number]        Equal Access to Services     SPEEDY ESQUEDA : Tonio Moralez, waaxda luqadaha, qaybta kaalmada ademaria fernanda, stephanie goodman. So [facility] [phone].    ATENCIÓN: Si habla español, tiene a villasenor disposición servicios gratuitos de asistencia lingüística. Llmanasa al [phone].    We comply with applicable federal civil rights laws and [place] laws. We do not discriminate on the basis of race, color, national origin, age, disability, sex, sexual orientation, or gender identity.

## 2018-06-01 NOTE — PROGRESS NOTES
Progress Note    Client Name: Antonio Ford  Date: 6/1/2018       Service Type: Individual      Session Start Time: 8am  Session End Time: 8:50am   Session Length: 45 - 50     Session #: 108     Attendees: Client attended alone      PHQ-9 / FRANCISCO JAVIER-7 Due Date: declined  DATA    Treatment Objective(s) Addressed in This Session:  identify at least 1-2 triggers for anxiety  Identify negative self-talk and behaviors: challenge core beliefs, myths, and actions      Progress on / Status of Treatment Objective(s) / Homework:  Satisfactory progress - ACTION (Actively working towards change); Intervened by reinforcing change plan / affirming steps taken continues to work on reducing anxiety, working, dating    Intervention:   CBT, solution focused   Client reports over the weekend he met girlfriend's daughter briefly. Client reports she was genuinely scared, which changed his perception about daughter. Relationship with girlfriend continues to go well.   Discussed continuing to feel as though there is something that makes him feel unsettled in present relationship. Discussed feeling obligated to parent her children if this were to be a long term relationship as their father's are not present. Encouraged client to explore this over the next few weeks, as well as exploring views around physical attraction, and how he can work towards being more satisfied. Reports job and relationship with children continues to be positive.     90-day Treatment Plan review completed: yes    Current Stressors / Issues:  family, financial, social,medical  Medication Review:  No changes to current psychiatric medication(s)   Current Outpatient Prescriptions   Medication     amitriptyline (ELAVIL) 50 MG tablet     DULoxetine (CYMBALTA) 30 MG EC capsule     fexofenadine (ALLEGRA) 180 MG tablet     gabapentin (NEURONTIN) 300 MG capsule     No current facility-administered medications for this visit.           Medication Compliance:  Yes    Changes in Health Issues:   None reported    Chemical Use Review:   Substance Use: Chemical use reviewed, no active concerns identified      Tobacco Use: No current tobacco use.        ASSESSMENT: Current Emotional / Mental Status (status of significant symptoms):    Risk status (Self / Other harm or suicidal ideation)  Client denies current fears or concerns for personal safety.  Client denies current or recent suicidal ideation or behaviors.  Client denies current or recent homicidal ideation or behaviors.  Client denies current or recent self injurious behavior or ideation.  Client denies other safety concerns.  A safety and risk management plan has not been developed at this time, however client was given the after-hours number should there be a change in any of these risk factors.    Appearance:   Appropriate   Eye Contact:   Good   Psychomotor Behavior: Normal   Attitude:   Cooperative   Orientation:   All  Speech   Rate / Production: Normal    Volume:  Normal   Mood:    Anxious   Affect:    Appropriate   Thought Content:  Clear   Thought Form:  Coherent  Logical   Insight:    Fair     Collateral Reports Completed:  Not Applicable    PLAN: (Homework, other)    Encouraged client to consider going back and listening to a recent sermon from his [employer] and looking at their devotionals to determine what causes his discontentment and how he can work towards being more satisfied.                                                      ________________________________________________________________________    Treatment Plan    Client's Name: Antonio Ford  YOB: 1970    Date: 8/20/2014    DSM-IV Diagnoses    AXIS I: 300.02(F41.1) FRANCISCO JAVIER  300.23(F40.10) Social Anxiety Disorder   F33.0 MDD recurrent Mild  Referral / Collaboration:  Collaboration was initiated with PCP MD.    Anticipated number of session or this episode of care: 75-80      MeasurableTreatment  Goal(s) related to diagnosis / functional impairment(s)  Goal 1:Depression and anxiety: Client will decrease depressed mood and anxiety as evidenced by PHQ9 and GAD7 scores 4 and Under in the next 6 months. scoring 9/1/2015:  GAD7: 14. 1/10/2017: PHQ9:4, GAD7:3, 5/31/17:PHQ9:7, GAD7:8, 11/14/2017: PHQ9:5, GAD7:3, 1/12/2018: PHQ9:6, GAD7:7, 4/24/2018: PHQ9:4, GAD7:3       I will know I've met my goal when I'm feeling less depressed and anxious, and happier in my relationship/my marriage is improving.    Objective #A (Client Action)  Status: Continued - Date(s):6/1/2018  Client will identify 1-2 initial signs or symptoms of anxiety and utilize anxiety reduction techniques to decrease anxiety. Client will ID triggers for depression and anxiety and work towards decreasing reactivity to or eliminating stressor if possible. client will develop more effective coping skills to manage depression and anxiety symptoms, will develop healthy cognitive patterns and beliefs, will increase ability to function adaptively and will continue to take medications as prescribed / participate in supportive activities. Client will improve communication and assertiveness skills and learn to set boundaries with others.    Intervention(s)   Therapist will teach client how to ID body cues for anxiety, anxiety reduction techniques, how to ID triggers for depression and anxiety- decrease reactivity/eliminate, lifestyle changes to reduce depression and anxiety, communication skills, explore cognitive beliefs and help client to develop healthy cognitive patterns and beliefs. Grief counseling.       Client has reviewed and agreed to the above plan.      Debbie Schreiber

## 2018-06-29 ENCOUNTER — OFFICE VISIT (OUTPATIENT)
Dept: PSYCHOLOGY | Facility: CLINIC | Age: 48
End: 2018-06-29
Payer: COMMERCIAL

## 2018-06-29 DIAGNOSIS — F33.0 MILD EPISODE OF RECURRENT MAJOR DEPRESSIVE DISORDER (H): ICD-10-CM

## 2018-06-29 DIAGNOSIS — F40.10 SOCIAL ANXIETY DISORDER: ICD-10-CM

## 2018-06-29 DIAGNOSIS — F41.1 GENERALIZED ANXIETY DISORDER: Primary | ICD-10-CM

## 2018-06-29 PROCEDURE — 90834 PSYTX W PT 45 MINUTES: CPT | Performed by: MARRIAGE & FAMILY THERAPIST

## 2018-06-29 ASSESSMENT — PATIENT HEALTH QUESTIONNAIRE - PHQ9
SUM OF ALL RESPONSES TO PHQ QUESTIONS 1-9: 5
SUM OF ALL RESPONSES TO PHQ QUESTIONS 1-9: 5
10. IF YOU CHECKED OFF ANY PROBLEMS, HOW DIFFICULT HAVE THESE PROBLEMS MADE IT FOR YOU TO DO YOUR WORK, TAKE CARE OF THINGS AT HOME, OR GET ALONG WITH OTHER PEOPLE: SOMEWHAT DIFFICULT

## 2018-06-29 NOTE — MR AVS SNAPSHOT
MRN:[id number]                      After Visit Summary   6/29/2018    Antonio Ford    MRN: [id number]           Visit Information        Provider Department      6/29/2018 8:00 AM Debbie Bro [facility] Generic      Your next 10 appointments already scheduled     Jul 24, 2018  8:00 AM CDT   Return Visit with Debbie ERIC HubbardBro   [facility] ([facility])    [address]  [facility] [address] [phone]            Aug 24, 2018  8:00 AM CDT   Return Visit with Debbie Bro   [facility] ([facility])    [address]  [facility] [address] [phone]              MyChart Information     [employer] gives you secure access to your electronic health record. If you see a primary care provider, you can also send messages to your care team and make appointments. If you have questions, please call your primary care clinic.  If you do not have a primary care provider, please call [phone] and they will assist you.        Care EveryWhere ID     This is your Care EveryWhere ID. This could be used by other organizations to access your [place] medical records  [id number]        Equal Access to Services     SPEEDY ESQUEDA : Tonio Moralez, waaxda luqadaha, qaybta kaalmada adesolangeda, stephanie goodman. So [facility] [phone].    ATENCIÓN: Si habla español, tiene a villasenor disposición servicios gratuitos de asistencia lingüística. Llmanasa al [phone].    We comply with applicable federal civil rights laws and [place] laws. We do not discriminate on the basis of race, color, national origin, age, disability, sex, sexual orientation, or gender identity.

## 2018-06-29 NOTE — PROGRESS NOTES
Progress Note    Client Name: Antonio Ford  Date: 6/29/2018       Service Type: Individual      Session Start Time: 8am  Session End Time: 8:50am   Session Length: 45 - 50     Session #: 109     Attendees: Client attended alone      PHQ-9 / FRANCISCO JAVIER-7 Due Date:   Answers for HPI/ROS submitted by the patient on 6/29/2018   If you checked off any problems, how difficult have these problems made it for you to do your work, take care of things at home, or get along with other people?: Somewhat difficult  PHQ9 TOTAL SCORE: 5  DATA    Treatment Objective(s) Addressed in This Session:  identify at least 1-2 triggers for anxiety  Identify negative self-talk and behaviors: challenge core beliefs, myths, and actions      Progress on / Status of Treatment Objective(s) / Homework:  Satisfactory progress - ACTION (Actively working towards change); Intervened by reinforcing change plan / affirming steps taken continues to work on reducing anxiety, working, dating    Intervention:   CBT, solution focused   Client reports since last visit he and girlfriend have drawn closer, and he is feeling more satisfied in the relationship. He has considered proposing, but presently does not have the finances to do so. Client reports girlfriend's daughter has been participating in dinners, and client attended her graduation and graduation party. He reports relationships with his children are mostly positive. He discussed some struggles related to moodiness of daughters, and brainstormed some approaches to use with daughters. Reports his work is going ok. He does not feel this job will be a long term fit but more of a stepping stone towards a higher paying position.         90-day Treatment Plan review completed: yes    Current Stressors / Issues:  family, financial, social,medical  Medication Review:  No changes to current psychiatric medication(s)   Current Outpatient Prescriptions   Medication      amitriptyline (ELAVIL) 50 MG tablet     DULoxetine (CYMBALTA) 30 MG EC capsule     fexofenadine (ALLEGRA) 180 MG tablet     gabapentin (NEURONTIN) 300 MG capsule     No current facility-administered medications for this visit.          Medication Compliance:  Yes    Changes in Health Issues:   None reported    Chemical Use Review:   Substance Use: Chemical use reviewed, no active concerns identified      Tobacco Use: No current tobacco use.        ASSESSMENT: Current Emotional / Mental Status (status of significant symptoms):    Risk status (Self / Other harm or suicidal ideation)  Client denies current fears or concerns for personal safety.  Client denies current or recent suicidal ideation or behaviors.  Client denies current or recent homicidal ideation or behaviors.  Client denies current or recent self injurious behavior or ideation.  Client denies other safety concerns.  A safety and risk management plan has not been developed at this time, however client was given the after-hours number should there be a change in any of these risk factors.    Appearance:   Appropriate   Eye Contact:   Good   Psychomotor Behavior: Normal   Attitude:   Cooperative   Orientation:   All  Speech   Rate / Production: Normal    Volume:  Normal   Mood:    Anxious   Affect:    Appropriate   Thought Content:  Clear   Thought Form:  Coherent  Logical   Insight:    Fair     Collateral Reports Completed:  Not Applicable    PLAN: (Homework, other)    Encouraged client to consider strategies to intervene with daughter's when they are don. Client continues to work on feeling more satisfied with present life.                                                      ________________________________________________________________________    Treatment Plan    Client's Name: Antonio Ford  YOB: 1970    Date: 8/20/2014    DSM-IV Diagnoses    AXIS I: 300.02(F41.1) FRANCISCO JAVIER  300.23(F40.10) Social Anxiety Disorder   F33.0 MDD recurrent  Mild  Referral / Collaboration:  Collaboration was initiated with PCP MD.    Anticipated number of session or this episode of care: 75-80      MeasurableTreatment Goal(s) related to diagnosis / functional impairment(s)  Goal 1:Depression and anxiety: Client will decrease depressed mood and anxiety as evidenced by PHQ9 and GAD7 scores 4 and Under in the next 6 months. scoring 9/1/2015:  GAD7: 14. 1/10/2017: PHQ9:4, GAD7:3, 5/31/17:PHQ9:7, GAD7:8, 11/14/2017: PHQ9:5, GAD7:3, 1/12/2018: PHQ9:6, GAD7:7, 4/24/2018: PHQ9:4, GAD7:3,  6/29/2018:PHQ9: 5       I will know I've met my goal when I'm feeling less depressed and anxious, and happier in my relationship/my marriage is improving.    Objective #A (Client Action)  Status: Continued - Date(s):6/29/2018  Client will identify 1-2 initial signs or symptoms of anxiety and utilize anxiety reduction techniques to decrease anxiety. Client will ID triggers for depression and anxiety and work towards decreasing reactivity to or eliminating stressor if possible. client will develop more effective coping skills to manage depression and anxiety symptoms, will develop healthy cognitive patterns and beliefs, will increase ability to function adaptively and will continue to take medications as prescribed / participate in supportive activities. Client will improve communication and assertiveness skills and learn to set boundaries with others.    Intervention(s)   Therapist will teach client how to ID body cues for anxiety, anxiety reduction techniques, how to ID triggers for depression and anxiety- decrease reactivity/eliminate, lifestyle changes to reduce depression and anxiety, communication skills, explore cognitive beliefs and help client to develop healthy cognitive patterns and beliefs. Grief counseling.       Client has reviewed and agreed to the above plan.      Debbie WILCOX  Schreiber

## 2018-06-30 ASSESSMENT — PATIENT HEALTH QUESTIONNAIRE - PHQ9: SUM OF ALL RESPONSES TO PHQ QUESTIONS 1-9: 5

## 2018-07-24 ENCOUNTER — OFFICE VISIT (OUTPATIENT)
Dept: PSYCHOLOGY | Facility: CLINIC | Age: 48
End: 2018-07-24
Payer: COMMERCIAL

## 2018-07-24 DIAGNOSIS — F41.1 GENERALIZED ANXIETY DISORDER: Primary | ICD-10-CM

## 2018-07-24 DIAGNOSIS — F40.10 SOCIAL ANXIETY DISORDER: ICD-10-CM

## 2018-07-24 PROCEDURE — 90834 PSYTX W PT 45 MINUTES: CPT | Performed by: MARRIAGE & FAMILY THERAPIST

## 2018-07-24 NOTE — PROGRESS NOTES
Progress Note    Client Name: Antonio Ford  Date: 7/24/2018       Service Type: Individual      Session Start Time: 8am  Session End Time: 8:50am   Session Length: 45 - 50     Session #: 110     Attendees: Client attended alone      PHQ-9 / FRANCISCO JAVIER-7 Due Date: declined  Answers for HPI/ROS submitted by the patient on 6/29/2018   If you checked off any problems, how difficult have these problems made it for you to do your work, take care of things at home, or get along with other people?: Somewhat difficult  PHQ9 TOTAL SCORE: 5  DATA    Treatment Objective(s) Addressed in This Session:  identify at least 1-2 triggers for anxiety  Client will set and maintain boundaries with ex wife      Progress on / Status of Treatment Objective(s) / Homework:  Satisfactory progress - ACTION (Actively working towards change); Intervened by reinforcing change plan / affirming steps taken continues to work on reducing anxiety    Intervention:   CBT, solution focused   Client reports he and girlfriend are looking at buying or building a home together in the [place] area near the time his lease ends in April. He reports they would like to be  prior to moving in together, and are not yet engaged. Processed anxiety related to how all the pieces will come into play, and when he might feel comfortable and ready to propose. Client reports they are planning for son to live independently once they move rather than living with them. Discussed some recent issues with ex wife taking extra time and multiple vacations over this summer which departs from their divorce decree. Encouraged client to set boundaries with ex wife around his time with children. Reports work has been going ok.       90-day Treatment Plan review completed: yes    Current Stressors / Issues:  family, financial, social,medical  Medication Review:  No changes to current psychiatric medication(s)   Current Outpatient  Prescriptions   Medication     amitriptyline (ELAVIL) 50 MG tablet     DULoxetine (CYMBALTA) 30 MG EC capsule     fexofenadine (ALLEGRA) 180 MG tablet     gabapentin (NEURONTIN) 300 MG capsule     No current facility-administered medications for this visit.          Medication Compliance:  Yes    Changes in Health Issues:   None reported    Chemical Use Review:   Substance Use: Chemical use reviewed, no active concerns identified      Tobacco Use: No current tobacco use.        ASSESSMENT: Current Emotional / Mental Status (status of significant symptoms):    Risk status (Self / Other harm or suicidal ideation)  Client denies current fears or concerns for personal safety.  Client denies current or recent suicidal ideation or behaviors.  Client denies current or recent homicidal ideation or behaviors.  Client denies current or recent self injurious behavior or ideation.  Client denies other safety concerns.  A safety and risk management plan has not been developed at this time, however client was given the after-hours number should there be a change in any of these risk factors.    Appearance:   Appropriate   Eye Contact:   Good   Psychomotor Behavior: Normal   Attitude:   Cooperative   Orientation:   All  Speech   Rate / Production: Normal    Volume:  Normal   Mood:    Anxious   Affect:    Appropriate   Thought Content:  Clear   Thought Form:  Coherent  Logical   Insight:    Fair     Collateral Reports Completed:  Not Applicable    PLAN: (Homework, other)    Encouraged client to consider when he would feel ready to propose and how it fits into his timeframe. Client will set and maintain boundaries with ex wife.                                                     ________________________________________________________________________    Treatment Plan    Client's Name: Antonio Ford  YOB: 1970    Date: 8/20/2014    DSM-IV Diagnoses    AXIS I: 300.02(F41.1) FRANCISCO JAVIER  300.23(F40.10) Social Anxiety Disorder    F33.0 MDD recurrent Mild  Referral / Collaboration:  Collaboration was initiated with PCP MD.    Anticipated number of session or this episode of care: 75-80      MeasurableTreatment Goal(s) related to diagnosis / functional impairment(s)  Goal 1:Depression and anxiety: Client will decrease depressed mood and anxiety as evidenced by PHQ9 and GAD7 scores 4 and Under in the next 6 months. scoring 9/1/2015:  GAD7: 14. 1/10/2017: PHQ9:4, GAD7:3, 5/31/17:PHQ9:7, GAD7:8, 11/14/2017: PHQ9:5, GAD7:3, 1/12/2018: PHQ9:6, GAD7:7, 4/24/2018: PHQ9:4, GAD7:3,  6/29/2018:PHQ9: 5       I will know I've met my goal when I'm feeling less depressed and anxious, and happier in my relationship/my marriage is improving.    Objective #A (Client Action)  Status: Continued - Date(s): 7/24/2018  Client will identify 1-2 initial signs or symptoms of anxiety and utilize anxiety reduction techniques to decrease anxiety. Client will ID triggers for depression and anxiety and work towards decreasing reactivity to or eliminating stressor if possible. client will develop more effective coping skills to manage depression and anxiety symptoms, will develop healthy cognitive patterns and beliefs, will increase ability to function adaptively and will continue to take medications as prescribed / participate in supportive activities. Client will improve communication and assertiveness skills and learn to set boundaries with others.    Intervention(s)   Therapist will teach client how to ID body cues for anxiety, anxiety reduction techniques, how to ID triggers for depression and anxiety- decrease reactivity/eliminate, lifestyle changes to reduce depression and anxiety, communication skills, explore cognitive beliefs and help client to develop healthy cognitive patterns and beliefs. Grief counseling.       Client has reviewed and agreed to the above plan.      Debbie WILCOX  Schreiber

## 2018-07-24 NOTE — MR AVS SNAPSHOT
MRN:[id number]                      After Visit Summary   7/24/2018    Antonio Ford    MRN: [id number]           Visit Information        Provider Department      7/24/2018 8:00 AM Debbie Bro [facility] Generic      Your next 10 appointments already scheduled     Aug 24, 2018  8:00 AM CDT   Return Visit with Debbie ERIC HubbardBro   [facility] ([facility])    [address]  [facility] [address] [phone]            Sep 21, 2018  8:00 AM CDT   Return Visit with Debbie Bro   [facility] ([facility])    [address]  [facility] [address] [phone]              MyChart Information     [employer] gives you secure access to your electronic health record. If you see a primary care provider, you can also send messages to your care team and make appointments. If you have questions, please call your primary care clinic.  If you do not have a primary care provider, please call [phone] and they will assist you.        Care EveryWhere ID     This is your Care EveryWhere ID. This could be used by other organizations to access your [place] medical records  [id number]        Equal Access to Services     SPEEDY ESQUEDA : Tonio Moralez, waaxda luqadaha, qaybta kaalmada adeestebanyada, stephanie goodman. So [facility] [phone].    ATENCIÓN: Si habla español, tiene a villasenor disposición servicios gratuitos de asistencia lingüística. Llmanasa al [phone].    We comply with applicable federal civil rights laws and [place] laws. We do not discriminate on the basis of race, color, national origin, age, disability, sex, sexual orientation, or gender identity.

## 2018-08-24 ENCOUNTER — OFFICE VISIT (OUTPATIENT)
Dept: PSYCHOLOGY | Facility: CLINIC | Age: 48
End: 2018-08-24
Payer: COMMERCIAL

## 2018-08-24 DIAGNOSIS — F41.1 GENERALIZED ANXIETY DISORDER: Primary | ICD-10-CM

## 2018-08-24 DIAGNOSIS — F40.10 SOCIAL ANXIETY DISORDER: ICD-10-CM

## 2018-08-24 PROCEDURE — 90834 PSYTX W PT 45 MINUTES: CPT | Performed by: MARRIAGE & FAMILY THERAPIST

## 2018-08-24 NOTE — PROGRESS NOTES
Progress Note    Client Name: Antonio oFrd  Date: 8/24/2018       Service Type: Individual      Session Start Time: 8am  Session End Time: 8:50am   Session Length: 45 - 50     Session #: 111     Attendees: Client attended alone      PHQ-9 / FRANCISCO JAVIER-7 Due Date: declined  Answers for HPI/ROS submitted by the patient on 6/29/2018   If you checked off any problems, how difficult have these problems made it for you to do your work, take care of things at home, or get along with other people?: Somewhat difficult  PHQ9 TOTAL SCORE: 5  DATA    Treatment Objective(s) Addressed in This Session:  identify at least 1-2 triggers for anxiety  Client will set and maintain boundaries with ex wife      Progress on / Status of Treatment Objective(s) / Homework:  Satisfactory progress - ACTION (Actively working towards change); Intervened by reinforcing change plan / affirming steps taken continues to work on reducing anxiety    Intervention:   CBT, solution focused   Client reports his girlfriend's daughter goes off to college in [place] this weekend. She has told her mother that they are moving too fast, and if they move in together she is not coming home for the summer. Encouraged client and girlfriend to transition their relationship with her daughter to an adult relationship, and set boundaries that it is her choice if she decides to stay in [place] this next summer. They are trying to figure out when the timing is right to put girlfriend's house up for sale, and start the process of building. Discussed timing, and gave client suggestions on when timing might work bext for them considering his lease is up at the end of March and would need to pay extra monthly to have a month to month lease. He reports some anxiety related to upcoming changes, but overall remains positive. Job continues to go ok, and client's relationships with children are going well.         90-day Treatment Plan  review completed: yes    Current Stressors / Issues:   financial, girlfriend's daughter, potentially buying a new home,medical  Medication Review:  No changes to current psychiatric medication(s)   Current Outpatient Prescriptions   Medication     amitriptyline (ELAVIL) 50 MG tablet     DULoxetine (CYMBALTA) 30 MG EC capsule     fexofenadine (ALLEGRA) 180 MG tablet     gabapentin (NEURONTIN) 300 MG capsule     No current facility-administered medications for this visit.          Medication Compliance:  Yes    Changes in Health Issues:   None reported    Chemical Use Review:   Substance Use: Chemical use reviewed, no active concerns identified      Tobacco Use: No current tobacco use.        ASSESSMENT: Current Emotional / Mental Status (status of significant symptoms):    Risk status (Self / Other harm or suicidal ideation)  Client denies current fears or concerns for personal safety.  Client denies current or recent suicidal ideation or behaviors.  Client denies current or recent homicidal ideation or behaviors.  Client denies current or recent self injurious behavior or ideation.  Client denies other safety concerns.  A safety and risk management plan has not been developed at this time, however client was given the after-hours number should there be a change in any of these risk factors.    Appearance:   Appropriate   Eye Contact:   Good   Psychomotor Behavior: Normal   Attitude:   Cooperative   Orientation:   All  Speech   Rate / Production: Normal    Volume:  Normal   Mood:    Anxious   Affect:    Appropriate   Thought Content:  Clear   Thought Form:  Coherent  Logical   Insight:    Fair     Collateral Reports Completed:  Not Applicable    PLAN: (Homework, other)    Encouraged client to consider when he would feel ready to propose and how it fits into his timeframe. Client and girlfriend will transition to an adult adult relationship with her daughter and set boundaries.                                                      ________________________________________________________________________    Treatment Plan    Client's Name: Antonio Ford  YOB: 1970    Date: 8/20/2014    DSM-IV Diagnoses    AXIS I: 300.02(F41.1) FRANCISCO JAVIER  300.23(F40.10) Social Anxiety Disorder   F33.0 MDD recurrent Mild  Referral / Collaboration:  Collaboration was initiated with PCP MD.    Anticipated number of session or this episode of care: 75-80      MeasurableTreatment Goal(s) related to diagnosis / functional impairment(s)  Goal 1:Depression and anxiety: Client will decrease depressed mood and anxiety as evidenced by PHQ9 and GAD7 scores 4 and Under in the next 6 months. scoring 9/1/2015:  GAD7: 14. 1/10/2017: PHQ9:4, GAD7:3, 5/31/17:PHQ9:7, GAD7:8, 11/14/2017: PHQ9:5, GAD7:3, 1/12/2018: PHQ9:6, GAD7:7, 4/24/2018: PHQ9:4, GAD7:3,  6/29/2018:PHQ9: 5       I will know I've met my goal when I'm feeling less depressed and anxious, and happier in my relationship/my marriage is improving.    Objective #A (Client Action)  Status: Continued - Date(s): 8/24/2018  Client will identify 1-2 initial signs or symptoms of anxiety and utilize anxiety reduction techniques to decrease anxiety. Client will ID triggers for depression and anxiety and work towards decreasing reactivity to or eliminating stressor if possible. client will develop more effective coping skills to manage depression and anxiety symptoms, will develop healthy cognitive patterns and beliefs, will increase ability to function adaptively and will continue to take medications as prescribed / participate in supportive activities. Client will improve communication and assertiveness skills and learn to set boundaries with others.    Intervention(s)   Therapist will teach client how to ID body cues for anxiety, anxiety reduction techniques, how to ID triggers for depression and anxiety- decrease reactivity/eliminate, lifestyle changes to reduce depression and anxiety, communication  skills, explore cognitive beliefs and help client to develop healthy cognitive patterns and beliefs. Grief counseling.       Client has reviewed and agreed to the above plan.      Debbie Schreiber

## 2018-08-24 NOTE — MR AVS SNAPSHOT
MRN:[id number]                      After Visit Summary   8/24/2018    Antonio Ford    MRN: [id number]           Visit Information        Provider Department      8/24/2018 8:00 AM Debbie Bro [facility] Generic      Your next 10 appointments already scheduled     Sep 21, 2018  8:00 AM CDT   Return Visit with Debbie ERIC Bellre   [facility] ([facility])    [address]  [facility] [address] [phone]            Oct 11, 2018  9:30 AM CDT   Return Visit with Debbie Bro   [facility] ([facility])    [address]  [facility] [address]   [phone]              MyChart Information     [employer] gives you secure access to your electronic health record. If you see a primary care provider, you can also send messages to your care team and make appointments. If you have questions, please call your primary care clinic.  If you do not have a primary care provider, please call [phone] and they will assist you.        Care EveryWhere ID     This is your Care EveryWhere ID. This could be used by other organizations to access your [place] medical records  [id number]        Equal Access to Services     SPEEDY ESQUEDA : Tonio Moralez, waaxda luqadaha, qaybta kaalmada ademaria fernanda, stephanie goodman. So [facility] [place] [facility] [phone].    ATENCIÓN: Si habla español, tiene a villasenor disposición servicios gratuitos de asistencia lingüística. Llmanasa al [phone].    We comply with applicable federal civil rights laws and [place] laws. We do not discriminate on the basis of race, color, national origin, age, disability, sex, sexual orientation, or gender identity.

## 2018-09-02 DIAGNOSIS — N48.89 PENILE PAIN: ICD-10-CM

## 2018-09-04 DIAGNOSIS — F33.0 MILD EPISODE OF RECURRENT MAJOR DEPRESSIVE DISORDER (H): ICD-10-CM

## 2018-09-04 DIAGNOSIS — F41.1 GENERALIZED ANXIETY DISORDER: ICD-10-CM

## 2018-09-04 DIAGNOSIS — F40.10 SOCIAL ANXIETY DISORDER: ICD-10-CM

## 2018-09-04 RX ORDER — DULOXETIN HYDROCHLORIDE 30 MG/1
CAPSULE, DELAYED RELEASE ORAL
Qty: 60 CAPSULE | Refills: 0 | Status: SHIPPED | OUTPATIENT
Start: 2018-09-04 | End: 2018-09-20

## 2018-09-04 RX ORDER — AMITRIPTYLINE HYDROCHLORIDE 50 MG/1
TABLET ORAL
Qty: 30 TABLET | Refills: 0 | Status: SHIPPED | OUTPATIENT
Start: 2018-09-04 | End: 2018-09-20

## 2018-09-04 NOTE — TELEPHONE ENCOUNTER
Elavil:  Medication is being filled for 1 time refill only due to:  Patient needs to be seen because physical.     Next 5 appointments (look out 90 days)     Sep 21, 2018  8:00 AM CDT   Return Visit with Debbie N [facility] ([facility])    [address] [place] [address]   [phone]            Oct 11, 2018  9:30 AM CDT   Return Visit with Debbie REYES [facility] ([facility])    [address] [place] [address]   [phone]                Vickie Enamorado, RN, BSN

## 2018-09-04 NOTE — TELEPHONE ENCOUNTER
Cymbalta:  Medication is being filled for 1 time refill only due to:  Patient needs to be seen because physical.    Vickie Enamorado, RN, BSN

## 2018-09-13 NOTE — PROGRESS NOTES
SUBJECTIVE:   CC: Antonio Ford is an 48 year old male who presents for preventative health visit.     Physical   Annual:     Getting at least 3 servings of Calcium per day:  Yes    Bi-annual eye exam:  Yes    Dental care twice a year:  Yes    Sleep apnea or symptoms of sleep apnea:  None    Diet:  Regular (no restrictions)    Frequency of exercise:  2-3 days/week    Duration of exercise:  15-30 minutes    Taking medications regularly:  Yes    Medication side effects:  None    Additional concerns today:  YES (sore thigh )      He has been noticing soreness over the left lateral thigh over the past 2 months. He states it is more like a burning pain with tingling compared to the right side but he denies any injury. It can be worse with exercise and running. He denies any leg weakness or shooting pain down the knee. He also denies any low back pain. He does often sit on his wallet but it is usually in his right back pocket.       Today's PHQ-2 Score:   PHQ-2 ( 1999 [employer]) 9/20/2018   Q1: Little interest or pleasure in doing things 0   Q2: Feeling down, depressed or hopeless 1   PHQ-2 Score 1   Q1: Little interest or pleasure in doing things Not at all   Q2: Feeling down, depressed or hopeless Several days   PHQ-2 Score 1   Answers for HPI/ROS submitted by the patient on 9/20/2018   PHQ-2 Score: 1  If you checked off any problems, how difficult have these problems made it for you to do your work, take care of things at home, or get along with other people?: Somewhat difficult  PHQ9 TOTAL SCORE: 3  FRANCISCO JAVIER 7 TOTAL SCORE: 4    Abuse: Current or Past(Physical, Sexual or Emotional)- No  Do you feel safe in your environment - Yes    Social History   Substance Use Topics     Smoking status: Never Smoker     Smokeless tobacco: Never Used     Alcohol use Yes      Comment: occasionally      Alcohol Use 9/20/2018   If you drink alcohol do you typically have greater than 3 drinks per day OR greater than 7 drinks per week? No   No  "flowsheet data found.    Last PSA:   PSA   Date Value Ref Range Status   12/27/2012 0.88 0 - 4 ug/L Final       Reviewed orders with patient. Reviewed health maintenance and updated orders accordingly - Yes      Reviewed and updated as needed this visit by clinical staff  Tobacco  Allergies  Meds  Problems  Med Hx  Surg Hx  Fam Hx  Soc Hx          Reviewed and updated as needed this visit by Provider  Allergies  Meds  Problems          Review of Systems   Constitutional: Negative for chills and fever.   HENT: Negative for congestion, ear pain, hearing loss and sore throat.    Eyes: Negative for pain and visual disturbance.   Respiratory: Negative for cough and shortness of breath.    Cardiovascular: Negative for chest pain, palpitations and peripheral edema.   Gastrointestinal: Negative for abdominal pain, constipation, diarrhea, heartburn, hematochezia and nausea.   Genitourinary: Negative for discharge, dysuria, frequency, genital sores, hematuria, impotence and urgency.   Musculoskeletal: Positive for myalgias. Negative for arthralgias and joint swelling.   Skin: Negative for rash.   Neurological: Negative for dizziness, weakness, headaches and paresthesias.   Psychiatric/Behavioral: Negative for mood changes. The patient is nervous/anxious.      OBJECTIVE:   /76 (BP Location: Right arm, Patient Position: Chair, Cuff Size: Adult Large)  Pulse 83  Temp 97.9  F (36.6  C) (Temporal)  Resp 16  Ht 5' 11.5\" (1.816 m)  Wt 259 lb (117.5 kg)  SpO2 95%  BMI 35.62 kg/m2    Physical Exam  GENERAL: healthy, alert and no distress  EYES: Eyes grossly normal to inspection, PERRL and conjunctivae and sclerae normal  HENT: ear canals and TM's normal, nose and mouth without ulcers or lesions  NECK: no adenopathy, no asymmetry, masses, or scars and thyroid normal to palpation  RESP: lungs clear to auscultation - no rales, rhonchi or wheezes  CV: regular rate and rhythm, normal S1 S2, no S3 or S4, no murmur, " click or rub, no peripheral edema and peripheral pulses strong  ABDOMEN: soft, nontender, no hepatosplenomegaly, no masses and bowel sounds normal   (male): normal male circumcised genitalia without lesions or urethral discharge, no hernia  MS: no gross musculoskeletal defects noted, no edema. FROM to all extremities. No spinal tenderness. Mild discomfort over the left anterolateral thigh.   SKIN: no suspicious lesions or rashes  NEURO: Normal strength and tone, mentation intact and speech normal. Cranial nerves II-XII are grossly intact. Dysesthesias over the left anterolateral thigh compared to the right. DTRs are 2+/4 throughout and symmetric. Gait is stable.  PSYCH: mentation appears normal, affect normal/bright    ASSESSMENT/PLAN:       ICD-10-CM    1. Encounter for routine adult health examination without abnormal findings Z00.00 Comprehensive metabolic panel (BMP + Alb, Alk Phos, ALT, AST, Total. Bili, TP)     Lipid panel reflex to direct LDL Fasting   2. Anemia of chronic disease D63.8 Comprehensive metabolic panel (BMP + Alb, Alk Phos, ALT, AST, Total. Bili, TP)     Hemoglobin   3. Chronic pelvic pain in male R10.2     G89.29    4. Social anxiety disorder F40.10 DULoxetine (CYMBALTA) 30 MG EC capsule   5. Mild episode of recurrent major depressive disorder (H) F33.0 DULoxetine (CYMBALTA) 30 MG EC capsule   6. Hyperlipidemia LDL goal <160 E78.5 Comprehensive metabolic panel (BMP + Alb, Alk Phos, ALT, AST, Total. Bili, TP)     Lipid panel reflex to direct LDL Fasting   7. Lateral femoral cutaneous entrapment syndrome, left G57.12    8. Penile pain N48.89 amitriptyline (ELAVIL) 50 MG tablet     gabapentin (NEURONTIN) 300 MG capsule   9. Generalized anxiety disorder F41.1 DULoxetine (CYMBALTA) 30 MG EC capsule   10. Class 2 obesity due to excess calories without serious comorbidity with body mass index (BMI) of 35.0 to 35.9 in adult E66.09     Z68.35        2. He had mild anemia in 2011 and 2012 but last year  "labs were normal. Will recheck again this year. He denies any symptoms of fatigue.    3, 8. Stable, chronic pain from cycling and his vasectomy. Will continue current medications.    4,5,9. Stable, continue Cymbalta.    6. Updated fasting labs ordered. If cholesterol remains high, I discussed starting a statin and he is in agreement with this.     7. Left thigh pain/burning is consistent with lateral femoral cutaneous syndrome given his dysethesias and location of the pain. I explained that this is typically a self limited process and should improve with looser underwear, pants, and belts, along with weight loss and avoiding sitting on his wallet. I recommend home stretching along with NSAIDs/Tylenol as needed. If not improving, he will contact the clinic.    10. I discussed the importance of a healthier diet and routine exercise.    Follow up annually.      COUNSELING:   Reviewed preventive health counseling, as reflected in patient instructions       Regular exercise       Healthy diet/nutrition    BP Readings from Last 1 Encounters:   09/20/18 112/76     Estimated body mass index is 35.62 kg/(m^2) as calculated from the following:    Height as of this encounter: 5' 11.5\" (1.816 m).    Weight as of this encounter: 259 lb (117.5 kg).    BP Screening:   Last 3 BP Readings:    BP Readings from Last 3 Encounters:   09/20/18 112/76   02/23/18 120/78   08/25/17 124/84       Weight management plan: Discussed healthy diet and exercise guidelines and patient will follow up in 12 months in clinic to re-evaluate.     reports that he has never smoked. He has never used smokeless tobacco.      Counseling Resources:  ATP IV Guidelines  Pooled Cohorts Equation Calculator  FRAX Risk Assessment  ICSI Preventive Guidelines  Dietary Guidelines for Americans, 2010  USDA's MyPlate  ASA Prophylaxis  Lung CA Screening    Josh Morales PA-C  [facility]"

## 2018-09-13 NOTE — PATIENT INSTRUCTIONS
Preventive Health Recommendations  Male Ages 40 to 49    Yearly exam:             See your health care provider every year in order to  o   Review health changes.   o   Discuss preventive care.    o   Review your medicines if your doctor has prescribed any.    You should be tested each year for STDs (sexually transmitted diseases) if you re at risk.     Have a cholesterol test every 5 years.     Have a colonoscopy (test for colon cancer) if someone in your family has had colon cancer or polyps before age 50.     After age 45, have a diabetes test (fasting glucose). If you are at risk for diabetes, you should have this test every 3 years.      Talk with your health care provider about whether or not a prostate cancer screening test (PSA) is right for you.    Shots: Get a flu shot each year. Get a tetanus shot every 10 years.     Nutrition:    Eat at least 5 servings of fruits and vegetables daily.     Eat whole-grain bread, whole-wheat pasta and brown rice instead of white grains and rice.     Get adequate Calcium and Vitamin D.     Lifestyle    Exercise for at least 150 minutes a week (30 minutes a day, 5 days a week). This will help you control your weight and prevent disease.     Limit alcohol to one drink per day.     No smoking.     Wear sunscreen to prevent skin cancer.     See your dentist every six months for an exam and cleaning.      Your left thigh pain may be due to a muscle strain but symptoms are more consistent with a condition called lateral femoral cutaneous syndrome which is an entrapment/irritation of a nerve in your leg.  Symptoms usually resolve on their own over time. I recommend avoiding tight pants, belts and underwear along with avoiding sitting on your wallet.  You can continue with ice, heat and daily stretching with ibuprofen or Tylenol as needed.  If not improving, let me know.    Work on a healthier low carb, low fat diet with routine exercise.    If your cholesterol remains high, we  may need to consider starting a statin.     Follow up annually.

## 2018-09-20 ENCOUNTER — OFFICE VISIT (OUTPATIENT)
Dept: FAMILY MEDICINE | Facility: OTHER | Age: 48
End: 2018-09-20
Payer: COMMERCIAL

## 2018-09-20 VITALS
BODY MASS INDEX: 35.08 KG/M2 | HEART RATE: 83 BPM | DIASTOLIC BLOOD PRESSURE: 76 MMHG | SYSTOLIC BLOOD PRESSURE: 112 MMHG | RESPIRATION RATE: 16 BRPM | HEIGHT: 72 IN | WEIGHT: 259 LBS | OXYGEN SATURATION: 95 % | TEMPERATURE: 97.9 F

## 2018-09-20 DIAGNOSIS — F33.0 MILD EPISODE OF RECURRENT MAJOR DEPRESSIVE DISORDER (H): ICD-10-CM

## 2018-09-20 DIAGNOSIS — E78.5 HYPERLIPIDEMIA LDL GOAL <160: ICD-10-CM

## 2018-09-20 DIAGNOSIS — Z00.00 ENCOUNTER FOR ROUTINE ADULT HEALTH EXAMINATION WITHOUT ABNORMAL FINDINGS: Primary | ICD-10-CM

## 2018-09-20 DIAGNOSIS — E66.09 CLASS 2 OBESITY DUE TO EXCESS CALORIES WITHOUT SERIOUS COMORBIDITY WITH BODY MASS INDEX (BMI) OF 35.0 TO 35.9 IN ADULT: ICD-10-CM

## 2018-09-20 DIAGNOSIS — R10.2 CHRONIC PELVIC PAIN IN MALE: ICD-10-CM

## 2018-09-20 DIAGNOSIS — F40.10 SOCIAL ANXIETY DISORDER: ICD-10-CM

## 2018-09-20 DIAGNOSIS — E66.812 CLASS 2 OBESITY DUE TO EXCESS CALORIES WITHOUT SERIOUS COMORBIDITY WITH BODY MASS INDEX (BMI) OF 35.0 TO 35.9 IN ADULT: ICD-10-CM

## 2018-09-20 DIAGNOSIS — G57.12 LATERAL FEMORAL CUTANEOUS ENTRAPMENT SYNDROME, LEFT: ICD-10-CM

## 2018-09-20 DIAGNOSIS — F41.1 GENERALIZED ANXIETY DISORDER: ICD-10-CM

## 2018-09-20 DIAGNOSIS — N48.89 PENILE PAIN: ICD-10-CM

## 2018-09-20 DIAGNOSIS — G89.29 CHRONIC PELVIC PAIN IN MALE: ICD-10-CM

## 2018-09-20 DIAGNOSIS — D63.8 ANEMIA OF CHRONIC DISORDER: ICD-10-CM

## 2018-09-20 LAB
ALBUMIN SERPL-MCNC: 4 G/DL (ref 3.4–5)
ALP SERPL-CCNC: 74 U/L (ref 40–150)
ALT SERPL W P-5'-P-CCNC: 37 U/L (ref 0–70)
ANION GAP SERPL CALCULATED.3IONS-SCNC: 12 MMOL/L (ref 3–14)
AST SERPL W P-5'-P-CCNC: 24 U/L (ref 0–45)
BILIRUB SERPL-MCNC: 0.5 MG/DL (ref 0.2–1.3)
BUN SERPL-MCNC: 18 MG/DL (ref 7–30)
CALCIUM SERPL-MCNC: 8.6 MG/DL (ref 8.5–10.1)
CHLORIDE SERPL-SCNC: 106 MMOL/L (ref 94–109)
CHOLEST SERPL-MCNC: 263 MG/DL
CO2 SERPL-SCNC: 22 MMOL/L (ref 20–32)
CREAT SERPL-MCNC: 0.94 MG/DL (ref 0.66–1.25)
GFR SERPL CREATININE-BSD FRML MDRD: 87 ML/MIN/1.7M2
GLUCOSE SERPL-MCNC: 97 MG/DL (ref 70–99)
HDLC SERPL-MCNC: 34 MG/DL
HGB BLD-MCNC: 13.8 G/DL (ref 13.3–17.7)
LDLC SERPL CALC-MCNC: 188 MG/DL
NONHDLC SERPL-MCNC: 229 MG/DL
POTASSIUM SERPL-SCNC: 4.2 MMOL/L (ref 3.4–5.3)
PROT SERPL-MCNC: 7.7 G/DL (ref 6.8–8.8)
SODIUM SERPL-SCNC: 140 MMOL/L (ref 133–144)
TRIGL SERPL-MCNC: 205 MG/DL

## 2018-09-20 PROCEDURE — 36415 COLL VENOUS BLD VENIPUNCTURE: CPT | Performed by: PHYSICIAN ASSISTANT

## 2018-09-20 PROCEDURE — 80053 COMPREHEN METABOLIC PANEL: CPT | Performed by: PHYSICIAN ASSISTANT

## 2018-09-20 PROCEDURE — 99396 PREV VISIT EST AGE 40-64: CPT | Performed by: PHYSICIAN ASSISTANT

## 2018-09-20 PROCEDURE — 80061 LIPID PANEL: CPT | Performed by: PHYSICIAN ASSISTANT

## 2018-09-20 PROCEDURE — 85018 HEMOGLOBIN: CPT | Performed by: PHYSICIAN ASSISTANT

## 2018-09-20 RX ORDER — CETIRIZINE HYDROCHLORIDE 10 MG/1
10 TABLET ORAL DAILY
COMMUNITY

## 2018-09-20 RX ORDER — DULOXETIN HYDROCHLORIDE 30 MG/1
30 CAPSULE, DELAYED RELEASE ORAL 2 TIMES DAILY
Qty: 180 CAPSULE | Refills: 3 | Status: SHIPPED | OUTPATIENT
Start: 2018-09-20 | End: 2019-10-12

## 2018-09-20 RX ORDER — GABAPENTIN 300 MG/1
900 CAPSULE ORAL 3 TIMES DAILY
Qty: 270 CAPSULE | Refills: 11 | Status: SHIPPED | OUTPATIENT
Start: 2018-09-20 | End: 2019-10-12

## 2018-09-20 RX ORDER — AMITRIPTYLINE HYDROCHLORIDE 50 MG/1
TABLET ORAL
Qty: 90 TABLET | Refills: 3 | Status: SHIPPED | OUTPATIENT
Start: 2018-09-20 | End: 2019-10-12

## 2018-09-20 ASSESSMENT — ENCOUNTER SYMPTOMS
WEAKNESS: 0
MYALGIAS: 1
PARESTHESIAS: 0
NAUSEA: 0
DIARRHEA: 0
SORE THROAT: 0
FEVER: 0
NERVOUS/ANXIOUS: 1
SHORTNESS OF BREATH: 0
PALPITATIONS: 0
DIZZINESS: 0
COUGH: 0
HEMATURIA: 0
FREQUENCY: 0
HEARTBURN: 0
HEADACHES: 0
CONSTIPATION: 0
JOINT SWELLING: 0
DYSURIA: 0
HEMATOCHEZIA: 0
CHILLS: 0
ABDOMINAL PAIN: 0
ARTHRALGIAS: 0
EYE PAIN: 0

## 2018-09-20 ASSESSMENT — PATIENT HEALTH QUESTIONNAIRE - PHQ9
10. IF YOU CHECKED OFF ANY PROBLEMS, HOW DIFFICULT HAVE THESE PROBLEMS MADE IT FOR YOU TO DO YOUR WORK, TAKE CARE OF THINGS AT HOME, OR GET ALONG WITH OTHER PEOPLE: SOMEWHAT DIFFICULT
SUM OF ALL RESPONSES TO PHQ QUESTIONS 1-9: 3
SUM OF ALL RESPONSES TO PHQ QUESTIONS 1-9: 3

## 2018-09-20 ASSESSMENT — ANXIETY QUESTIONNAIRES
GAD7 TOTAL SCORE: 4
6. BECOMING EASILY ANNOYED OR IRRITABLE: NOT AT ALL
7. FEELING AFRAID AS IF SOMETHING AWFUL MIGHT HAPPEN: NOT AT ALL
7. FEELING AFRAID AS IF SOMETHING AWFUL MIGHT HAPPEN: NOT AT ALL
2. NOT BEING ABLE TO STOP OR CONTROL WORRYING: SEVERAL DAYS
GAD7 TOTAL SCORE: 4
GAD7 TOTAL SCORE: 4
5. BEING SO RESTLESS THAT IT IS HARD TO SIT STILL: NOT AT ALL
1. FEELING NERVOUS, ANXIOUS, OR ON EDGE: SEVERAL DAYS
3. WORRYING TOO MUCH ABOUT DIFFERENT THINGS: SEVERAL DAYS
4. TROUBLE RELAXING: SEVERAL DAYS

## 2018-09-20 NOTE — MR AVS SNAPSHOT
After Visit Summary   9/20/2018    Antonio Ford    MRN: [id number]           Patient Information     Date Of Birth          1970        Visit Information        Provider Department      9/20/2018 8:00 AM Josh Morales PA-C [facility]        Today's Diagnoses     Encounter for routine adult health examination without abnormal findings    -  1    Anemia of chronic disease        Chronic pelvic pain in male        Social anxiety disorder        Mild episode of recurrent major depressive disorder (H)        Hyperlipidemia LDL goal <160        Lateral femoral cutaneous entrapment syndrome, left        Penile pain        Generalized anxiety disorder          Care Instructions      Preventive Health Recommendations  Male Ages 40 to 49    Yearly exam:             See your health care provider every year in order to  o   Review health changes.   o   Discuss preventive care.    o   Review your medicines if your doctor has prescribed any.    You should be tested each year for STDs (sexually transmitted diseases) if you re at risk.     Have a cholesterol test every 5 years.     Have a colonoscopy (test for colon cancer) if someone in your family has had colon cancer or polyps before age 50.     After age 45, have a diabetes test (fasting glucose). If you are at risk for diabetes, you should have this test every 3 years.      Talk with your health care provider about whether or not a prostate cancer screening test (PSA) is right for you.    Shots: Get a flu shot each year. Get a tetanus shot every 10 years.     Nutrition:    Eat at least 5 servings of fruits and vegetables daily.     Eat whole-grain bread, whole-wheat pasta and brown rice instead of white grains and rice.     Get adequate Calcium and Vitamin D.     Lifestyle    Exercise for at least 150 minutes a week (30 minutes a day, 5 days a week). This will help you control your weight and prevent disease.     Limit alcohol to one  drink per day.     No smoking.     Wear sunscreen to prevent skin cancer.     See your dentist every six months for an exam and cleaning.      Your left thigh pain may be due to a muscle strain but symptoms are more consistent with a condition called lateral femoral cutaneous syndrome which is an entrapment/irritation of a nerve in your leg.  Symptoms usually resolve on their own over time. I recommend avoiding tight pants, belts and underwear along with avoiding sitting on your wallet.  You can continue with ice, heat and daily stretching with ibuprofen or Tylenol as needed.  If not improving, let me know.    Work on a healthier low carb, low fat diet with routine exercise.    If your cholesterol remains high, we may need to consider starting a statin.     Follow up annually.           Follow-ups after your visit        Follow-up notes from your care team     Return in about 1 year (around 9/20/2019) for Routine Visit, Physical Exam, Lab Work.      Your next 10 appointments already scheduled     Sep 21, 2018  8:00 AM CDT   Return Visit with Debbie REYES [facility] ([address] [id number] [phone]            Oct 11, 2018  9:30 AM CDT   Return Visit with Debbie N [facility] ([address] [address] [phone]              Who to contact     If you have questions or need follow up information about today's clinic visit or your schedule please contact [facility] directly at [phone].  Normal or non-critical lab and imaging results will be communicated to you by MyChart, letter or phone within 4 business days after the clinic has received the results. If you do not hear from us within 7 days, please contact the clinic through MyChart or phone. If you have a critical or abnormal lab result, we will notify you by phone as soon as  "possible.  Submit refill requests through [employer] or call your pharmacy and they will forward the refill request to us. Please allow 3 business days for your refill to be completed.          Additional Information About Your Visit        [employer] Information     [employer] gives you secure access to your electronic health record. If you see a primary care provider, you can also send messages to your care team and make appointments. If you have questions, please call your primary care clinic.  If you do not have a primary care provider, please call [phone] and they will assist you.        Care EveryWhere ID     This is your Care EveryWhere ID. This could be used by other organizations to access your [place] medical records  [id number]        Your Vitals Were     Pulse Temperature Respirations Height Pulse Oximetry BMI (Body Mass Index)    83 97.9  F (36.6  C) (Temporal) 16 5' 11.5\" (1.816 m) 95% 35.62 kg/m2       Blood Pressure from Last 3 Encounters:   09/20/18 112/76   02/23/18 120/78   08/25/17 124/84    Weight from Last 3 Encounters:   09/20/18 259 lb (117.5 kg)   02/23/18 250 lb (113.4 kg)   08/25/17 234 lb (106.1 kg)              We Performed the Following     Comprehensive metabolic panel (BMP + Alb, Alk Phos, ALT, AST, Total. Bili, TP)     Hemoglobin     Lipid panel reflex to direct LDL Fasting          Today's Medication Changes          These changes are accurate as of 9/20/18  8:54 AM.  If you have any questions, ask your nurse or doctor.               These medicines have changed or have updated prescriptions.        Dose/Directions    DULoxetine 30 MG EC capsule   Commonly known as:  CYMBALTA   This may have changed:  See the new instructions.   Used for:  Generalized anxiety disorder, Social anxiety disorder, Mild episode of recurrent major depressive disorder (H)   Changed by:  Josh Morales PA-C        Dose:  30 mg   Take 1 capsule (30 mg) by mouth 2 times daily   Quantity:  180 capsule "   Refills:  3            Where to get your medicines      These medications were sent to [facility] [place] [facility] [address] [facility] [place], [place] - [address] [facility]  [address] [facility] [address]     Phone:  [phone]     amitriptyline 50 MG tablet    DULoxetine 30 MG EC capsule    gabapentin 300 MG capsule                Primary Care Provider Office Phone # Fax #    Josh Modesto Morales PA-C [phone]       [address] [place] [address] [address]  [facility] [address]        Equal Access to Services     SPEEDY ESQUEDA : Hadii aad ku hadasho Soomaali, waaxda luqadaha, qaybta kaalmada adeegyada, waxay idiin hayaan celina jones . So [facility] [phone].    ATENCIÓN: Si habla español, tiene a villasenor disposición servicios gratuitos de asistencia lingüística. [facility] [phone].    We comply with applicable federal civil rights laws and [place] laws. We do not discriminate on the basis of race, color, national origin, age, disability, sex, sexual orientation, or gender identity.            Thank you!     Thank you for choosing [facility]  for your care. Our goal is always to provide you with excellent care. Hearing back from our patients is one way we can continue to improve our services. Please take a few minutes to complete the written survey that you may receive in the mail after your visit with us. Thank you!             Your Updated Medication List - Protect others around you: Learn how to safely use, store and throw away your medicines at www.disposemymeds.org.          This list is accurate as of 9/20/18  8:54 AM.  Always use your most recent med list.                   Brand Name Dispense Instructions for use Diagnosis    amitriptyline 50 MG tablet    ELAVIL    90 tablet    Take 1 tablet (50 mg) by mouth At Bedtime    Penile pain       cetirizine 10 MG tablet    zyrTEC     Take 10 mg by mouth daily        DULoxetine 30 MG EC capsule    CYMBALTA    180 capsule    Take 1 capsule (30 mg) by mouth 2  times daily    Generalized anxiety disorder, Social anxiety disorder, Mild episode of recurrent major depressive disorder (H)       gabapentin 300 MG capsule    NEURONTIN    270 capsule    Take 3 capsules (900 mg) by mouth 3 times daily    Penile pain

## 2018-09-21 ENCOUNTER — OFFICE VISIT (OUTPATIENT)
Dept: PSYCHOLOGY | Facility: CLINIC | Age: 48
End: 2018-09-21
Payer: COMMERCIAL

## 2018-09-21 DIAGNOSIS — F41.1 GENERALIZED ANXIETY DISORDER: Primary | ICD-10-CM

## 2018-09-21 DIAGNOSIS — F40.10 SOCIAL ANXIETY DISORDER: ICD-10-CM

## 2018-09-21 PROCEDURE — 90834 PSYTX W PT 45 MINUTES: CPT | Performed by: MARRIAGE & FAMILY THERAPIST

## 2018-09-21 ASSESSMENT — ANXIETY QUESTIONNAIRES: GAD7 TOTAL SCORE: 4

## 2018-09-21 ASSESSMENT — PATIENT HEALTH QUESTIONNAIRE - PHQ9: SUM OF ALL RESPONSES TO PHQ QUESTIONS 1-9: 3

## 2018-09-21 NOTE — MR AVS SNAPSHOT
MRN:[id number]                      After Visit Summary   9/21/2018    Antonio Ford    MRN: [id number]           Visit Information        Provider Department      9/21/2018 8:00 AM Debbie Bro [facility]      Your next 10 appointments already scheduled     Oct 11, 2018  9:30 AM CDT   Return Visit with Debbie ERIC Bellre   [facility] ([facility])    [address]  [facility] [address]   [phone]            Nov 02, 2018  9:00 AM CDT   Return Visit with Debbie Bro   [facility] ([facility])    [address]  [facility] [address]   [phone]              MyChart Information     [employer] gives you secure access to your electronic health record. If you see a primary care provider, you can also send messages to your care team and make appointments. If you have questions, please call your primary care clinic.  If you do not have a primary care provider, please call [phone] and they will assist you.        Care EveryWhere ID     This is your Care EveryWhere ID. This could be used by other organizations to access your [place] medical records  [id number]        Equal Access to Services     SPEEDY ESQUEDA : Tonio Moralez, waaxda luqadaha, qaybta kaalmada ademaria fernanda, stephanie goodman. So [facility] [phone].    ATENCIÓN: Si habla español, tiene a villasenor disposición servicios gratuitos de asistencia lingüística. [facility] al [phone].    We comply with applicable federal civil rights laws and [place] laws. We do not discriminate on the basis of race, color, national origin, age, disability, sex, sexual orientation, or gender identity.

## 2018-09-21 NOTE — PROGRESS NOTES
Progress Note    Client Name: Antonio Ford  Date: 9/21/2018       Service Type: Individual      Session Start Time: 8am  Session End Time: 8:50am   Session Length: 45 - 50     Session #: 112     Attendees: Client attended alone      PHQ-9 / FRANCISCO JAVIER-7 Due Date:  Answers for HPI/ROS submitted by the patient on 9/20/2018   If you checked off any problems, how difficult have these problems made it for you to do your work, take care of things at home, or get along with other people?: Somewhat difficult  PHQ9 TOTAL SCORE: 3, GAD7: 4  DATA    Treatment Objective(s) Addressed in This Session:  use at least 1-2 coping skills for anxiety management in the next 3-4 weeks         Progress on / Status of Treatment Objective(s) / Homework:  Satisfactory progress - ACTION (Actively working towards change); Intervened by reinforcing change plan / affirming steps taken continues to work on reducing anxiety    Intervention:   CBT, solution focused   Client reports some anxiety when attending work meetings. Discussed using confident stance, thought refuting, and positive self talk prior and during meetings. Discussed ongoing anxiety related to timing of putting girlfriend's house up for sale, starting the process of building or buying a home, how it will effect their children, and moving in together. Encouraged client to also consider how amount paid for home could fiscally affect them moving forward, and not over committing to make children happy.    90-day Treatment Plan review completed: yes    Current Stressors / Issues:   financial, family, girlfriend's daughter, potentially buying a new home,medical  Medication Review:  No changes to current psychiatric medication(s)   Current Outpatient Prescriptions   Medication     amitriptyline (ELAVIL) 50 MG tablet     cetirizine (ZYRTEC) 10 MG tablet     DULoxetine (CYMBALTA) 30 MG EC capsule     gabapentin (NEURONTIN) 300 MG capsule     No current  facility-administered medications for this visit.          Medication Compliance:  Yes    Changes in Health Issues:   None reported    Chemical Use Review:   Substance Use: Chemical use reviewed, no active concerns identified      Tobacco Use: No current tobacco use.        ASSESSMENT: Current Emotional / Mental Status (status of significant symptoms):    Risk status (Self / Other harm or suicidal ideation)  Client denies current fears or concerns for personal safety.  Client denies current or recent suicidal ideation or behaviors.  Client denies current or recent homicidal ideation or behaviors.  Client denies current or recent self injurious behavior or ideation.  Client denies other safety concerns.  A safety and risk management plan has not been developed at this time, however client was given the after-hours number should there be a change in any of these risk factors.    Appearance:   Appropriate   Eye Contact:   Good   Psychomotor Behavior: Normal   Attitude:   Cooperative   Orientation:   All  Speech   Rate / Production: Normal    Volume:  Normal   Mood:    Anxious   Affect:    Appropriate   Thought Content:  Clear   Thought Form:  Coherent  Logical   Insight:    Fair     Collateral Reports Completed:  Not Applicable    PLAN: (Homework, other)    See above note section.                                                     ________________________________________________________________________    Treatment Plan    Client's Name: Antonio Ford  YOB: 1970    Date: 8/20/2014    DSM-IV Diagnoses    AXIS I: 300.02(F41.1) FRANCISCO JAVIER  300.23(F40.10) Social Anxiety Disorder   F33.0 MDD recurrent Mild  Referral / Collaboration:  Collaboration was initiated with PCP MD.    Anticipated number of session or this episode of care: 75-80      MeasurableTreatment Goal(s) related to diagnosis / functional impairment(s)  Goal 1:Depression and anxiety: Client will decrease depressed mood and anxiety as evidenced by PHQ9  and GAD7 scores 4 and Under in the next 6 months. scoring 9/1/2015:  GAD7: 14. 1/10/2017: PHQ9:4, GAD7:3, 5/31/17:PHQ9:7, GAD7:8, 11/14/2017: PHQ9:5, GAD7:3, 1/12/2018: PHQ9:6, GAD7:7, 4/24/2018: PHQ9:4, GAD7:3,  6/29/2018:PHQ9: 5, 9/20/18: PHQ9: 3, GAD7: 4       I will know I've met my goal when I'm feeling less depressed and anxious, and happier in my relationship/my marriage is improving.    Objective #A (Client Action)  Status: Continued - Date(s): 9/21/2018  Client will identify 1-2 initial signs or symptoms of anxiety and utilize anxiety reduction techniques to decrease anxiety. Client will ID triggers for depression and anxiety and work towards decreasing reactivity to or eliminating stressor if possible. client will develop more effective coping skills to manage depression and anxiety symptoms, will develop healthy cognitive patterns and beliefs, will increase ability to function adaptively and will continue to take medications as prescribed / participate in supportive activities. Client will improve communication and assertiveness skills and learn to set boundaries with others.    Intervention(s)   Therapist will teach client how to ID body cues for anxiety, anxiety reduction techniques, how to ID triggers for depression and anxiety- decrease reactivity/eliminate, lifestyle changes to reduce depression and anxiety, communication skills, explore cognitive beliefs and help client to develop healthy cognitive patterns and beliefs. Grief counseling.       Client has reviewed and agreed to the above plan.      Debbie Schreiber

## 2018-10-11 ENCOUNTER — OFFICE VISIT (OUTPATIENT)
Dept: PSYCHOLOGY | Facility: CLINIC | Age: 48
End: 2018-10-11
Payer: COMMERCIAL

## 2018-10-11 DIAGNOSIS — F41.1 GENERALIZED ANXIETY DISORDER: Primary | ICD-10-CM

## 2018-10-11 DIAGNOSIS — F40.10 SOCIAL ANXIETY DISORDER: ICD-10-CM

## 2018-10-11 PROCEDURE — 90834 PSYTX W PT 45 MINUTES: CPT | Performed by: MARRIAGE & FAMILY THERAPIST

## 2018-10-11 NOTE — MR AVS SNAPSHOT
MRN:[id number]                      After Visit Summary   10/11/2018    Antonio Ford    MRN: [id number]           Visit Information        Provider Department      10/11/2018 9:30 AM Debbie Bro [facility] Generic      Your next 10 appointments already scheduled     Nov 02, 2018  9:00 AM CDT   Return Visit with Debbie ERIC HubbardBro   [facility] ([facility])    [address]  [facility] [id number] [phone]            Nov 16, 2018  9:00 AM CST   Return Visit with Debbie Bro   [facility] ([facility])    [address]  [facility] [address]   [phone]              MyChart Information     [employer] gives you secure access to your electronic health record. If you see a primary care provider, you can also send messages to your care team and make appointments. If you have questions, please call your primary care clinic.  If you do not have a primary care provider, please call [phone] and they will assist you.        Care EveryWhere ID     This is your Care EveryWhere ID. This could be used by other organizations to access your [place] medical records  [id number]        Equal Access to Services     SPEEDY ESQUEDA : Tonio Moralez, waaxda luqadaha, qaybta kaalmada cristiano, stephanie goodman. So [facility] [phone].    ATENCIÓN: Si habla español, tiene a villasenor disposición servicios gratuitos de asistencia lingüística. Llame al [phone].    We comply with applicable federal civil rights laws and [place] laws. We do not discriminate on the basis of race, color, national origin, age, disability, sex, sexual orientation, or gender identity.

## 2018-10-11 NOTE — PROGRESS NOTES
"                                             Progress Note    Client Name: Antonio Ford  Date: 10/11/2018       Service Type: Individual      Session Start Time: 9:30am  Session End Time: 10:15am   Session Length: 45 - 50     Session #: 113     Attendees: Client attended alone      PHQ-9 / FRANCISCO JAVIER-7 Due Date:  Answers for HPI/ROS submitted by the patient on 9/20/2018   If you checked off any problems, how difficult have these problems made it for you to do your work, take care of things at home, or get along with other people?: Somewhat difficult  PHQ9 TOTAL SCORE: 3, GAD7: 4  DATA    Treatment Objective(s) Addressed in This Session:  use at least 1-2 coping skills for anxiety management in the next 3-4 weeks         Progress on / Status of Treatment Objective(s) / Homework:  Satisfactory progress - ACTION (Actively working towards change); Intervened by reinforcing change plan / affirming steps taken continues to work on reducing anxiety    Intervention:   CBT, solution focused   Client reports some anxiety related to not being chosen for a large project at work and not being prepared for a couple meetings. Client reports he stayed home from the past 2 days due to stomach upset and anxiety which he has not done in the past. Discussed how he might address these concerns with his boss, and prepare for meetings. Discussed issues with ex wife \"Being controlling and snooping,\" on him and his children: commenting on what he is feeding kids while at his home, making phone calls to him about daughter and boyfriend being at his home together, issues related to fruit flies in the basement from son not taking out trash- ex thinking house is not clean. Encouraged client to set boundaries with ex wife. Processed anxiety related to turning in financial paperwork to buy house with girlfriend, and worry that due to past income he might not qualify. Client reports he looked at ex dating profile the other day, when feeling anxious " about next big steps in his present relationship. Client reports he is still friends with ex girlfriend's mother. Encouraged client to  de friend ex girlfriend's mother and move forward.       90-day Treatment Plan review completed: yes    Current Stressors / Issues:   financial, family, girlfriend's daughter, potentially buying a new home,medical  Medication Review:  No changes to current psychiatric medication(s)   Current Outpatient Prescriptions   Medication     amitriptyline (ELAVIL) 50 MG tablet     cetirizine (ZYRTEC) 10 MG tablet     DULoxetine (CYMBALTA) 30 MG EC capsule     gabapentin (NEURONTIN) 300 MG capsule     No current facility-administered medications for this visit.          Medication Compliance:  Yes    Changes in Health Issues:   None reported    Chemical Use Review:   Substance Use: Chemical use reviewed, no active concerns identified      Tobacco Use: No current tobacco use.        ASSESSMENT: Current Emotional / Mental Status (status of significant symptoms):    Risk status (Self / Other harm or suicidal ideation)  Client denies current fears or concerns for personal safety.  Client denies current or recent suicidal ideation or behaviors.  Client denies current or recent homicidal ideation or behaviors.  Client denies current or recent self injurious behavior or ideation.  Client denies other safety concerns.  A safety and risk management plan has not been developed at this time, however client was given the after-hours number should there be a change in any of these risk factors.    Appearance:   Appropriate   Eye Contact:   Good   Psychomotor Behavior: Normal   Attitude:   Cooperative   Orientation:   All  Speech   Rate / Production: Normal    Volume:  Normal   Mood:    Anxious   Affect:    Appropriate   Thought Content:  Clear   Thought Form:  Coherent  Logical   Insight:    Fair     Collateral Reports Completed:  Not Applicable    PLAN: (Homework, other)    See above note  section.                                                     ________________________________________________________________________    Treatment Plan    Client's Name: Antonio Ford  YOB: 1970    Date: 8/20/2014    DSM-IV Diagnoses    AXIS I: 300.02(F41.1) FRANCISCO JAVIER  300.23(F40.10) Social Anxiety Disorder   F33.0 MDD recurrent Mild  Referral / Collaboration:  Collaboration was initiated with PCP MD.    Anticipated number of session or this episode of care: 75-80      MeasurableTreatment Goal(s) related to diagnosis / functional impairment(s)  Goal 1:Depression and anxiety: Client will decrease depressed mood and anxiety as evidenced by PHQ9 and GAD7 scores 4 and Under in the next 6 months. scoring 9/1/2015:  GAD7: 14. 1/10/2017: PHQ9:4, GAD7:3, 5/31/17:PHQ9:7, GAD7:8, 11/14/2017: PHQ9:5, GAD7:3, 1/12/2018: PHQ9:6, GAD7:7, 4/24/2018: PHQ9:4, GAD7:3,  6/29/2018:PHQ9: 5, 9/20/18: PHQ9: 3, GAD7: 4       I will know I've met my goal when I'm feeling less depressed and anxious, and happier in my relationship/my marriage is improving.    Objective #A (Client Action)  Status: Continued - Date(s): 10/11/2018  Client will identify 1-2 initial signs or symptoms of anxiety and utilize anxiety reduction techniques to decrease anxiety. Client will ID triggers for depression and anxiety and work towards decreasing reactivity to or eliminating stressor if possible. client will develop more effective coping skills to manage depression and anxiety symptoms, will develop healthy cognitive patterns and beliefs, will increase ability to function adaptively and will continue to take medications as prescribed / participate in supportive activities. Client will improve communication and assertiveness skills and learn to set boundaries with others.    Intervention(s)   Therapist will teach client how to ID body cues for anxiety, anxiety reduction techniques, how to ID triggers for depression and anxiety- decrease  reactivity/eliminate, lifestyle changes to reduce depression and anxiety, communication skills, explore cognitive beliefs and help client to develop healthy cognitive patterns and beliefs. Grief counseling.       Client has reviewed and agreed to the above plan.      Debbie Schreiber

## 2018-11-02 ENCOUNTER — OFFICE VISIT (OUTPATIENT)
Dept: PSYCHOLOGY | Facility: CLINIC | Age: 48
End: 2018-11-02
Payer: COMMERCIAL

## 2018-11-02 DIAGNOSIS — F40.10 SOCIAL ANXIETY DISORDER: ICD-10-CM

## 2018-11-02 DIAGNOSIS — F41.1 GENERALIZED ANXIETY DISORDER: Primary | ICD-10-CM

## 2018-11-02 PROCEDURE — 90834 PSYTX W PT 45 MINUTES: CPT | Performed by: MARRIAGE & FAMILY THERAPIST

## 2018-11-02 NOTE — MR AVS SNAPSHOT
MRN:[id number]                      After Visit Summary   11/2/2018    Antonio Ford    MRN: [id number]           Visit Information        Provider Department      11/2/2018 9:00 AM Debbie Bro [facility] Generic      Your next 10 appointments already scheduled     Nov 16, 2018  9:00 AM CST   Return Visit with Debbie ERIC HubbardBro   [facility] ([facility])    [address]  [facility] [address] [phone]            Dec 07, 2018  8:00 AM CST   Return Visit with Debbie Bro   [facility] ([facility])    [address]  [facility] [address] [phone]              MyChart Information     [employer] gives you secure access to your electronic health record. If you see a primary care provider, you can also send messages to your care team and make appointments. If you have questions, please call your primary care clinic.  If you do not have a primary care provider, please call [phone] and they will assist you.        Care EveryWhere ID     This is your Care EveryWhere ID. This could be used by other organizations to access your [place] medical records  [id number]        Equal Access to Services     SPEEDY ESQUEDA AH: Tonio Moralez, waaxda luleslieadaha, qaybta kaalmada cristiano, stephanie goodman. So [facility] [phone].    ATENCIÓN: Si habla español, tiene a villasenor disposición servicios gratuitos de asistencia lingüística. Llame al [phone].    We comply with applicable federal civil rights laws and [place] laws. We do not discriminate on the basis of race, color, national origin, age, disability, sex, sexual orientation, or gender identity.

## 2018-11-03 NOTE — PROGRESS NOTES
Progress Note    Client Name: Antonio Ford  Date: 11/2/2018       Service Type: Individual      Session Start Time: 9am  Session End Time: 9:45am   Session Length: 45 - 50     Session #: 114     Attendees: Client attended alone      PHQ-9 / FRANCISCO JAVIER-7 Due Date:  Answers for HPI/ROS submitted by the patient on 9/20/2018   If you checked off any problems, how difficult have these problems made it for you to do your work, take care of things at home, or get along with other people?: Somewhat difficult  PHQ9 TOTAL SCORE: 3, GAD7: 4  DATA    Treatment Objective(s) Addressed in This Session:  identify at least 2-3 triggers for anxiety         Progress on / Status of Treatment Objective(s) / Homework:  Satisfactory progress - ACTION (Actively working towards change); Intervened by reinforcing change plan / affirming steps taken continues to work on reducing anxiety    Intervention:   CBT, solution focused   Processed ongoing anxiety related to girlfriend's daughter's avoidance of him, possible social anxiety, and how this will impact their living situation when buying a home. Encouraged client to consider reaching out to girlfriend's daughter offering support and talking with her about his own journey with social anxiety. Client was able to turn in his financials to work towards approval for a mortgage. They are looking to buy the builder's model.         90-day Treatment Plan review completed: yes    Current Stressors / Issues:   financial, family, girlfriend's daughter, potentially buying a new home,medical  Medication Review:  No changes to current psychiatric medication(s)   Current Outpatient Prescriptions   Medication     amitriptyline (ELAVIL) 50 MG tablet     cetirizine (ZYRTEC) 10 MG tablet     DULoxetine (CYMBALTA) 30 MG EC capsule     gabapentin (NEURONTIN) 300 MG capsule     No current facility-administered medications for this visit.          Medication  Compliance:  Yes    Changes in Health Issues:   None reported    Chemical Use Review:   Substance Use: Chemical use reviewed, no active concerns identified      Tobacco Use: No current tobacco use.        ASSESSMENT: Current Emotional / Mental Status (status of significant symptoms):    Risk status (Self / Other harm or suicidal ideation)  Client denies current fears or concerns for personal safety.  Client denies current or recent suicidal ideation or behaviors.  Client denies current or recent homicidal ideation or behaviors.  Client denies current or recent self injurious behavior or ideation.  Client denies other safety concerns.  A safety and risk management plan has not been developed at this time, however client was given the after-hours number should there be a change in any of these risk factors.    Appearance:   Appropriate   Eye Contact:   Good   Psychomotor Behavior: Normal   Attitude:   Cooperative   Orientation:   All  Speech   Rate / Production: Normal    Volume:  Normal   Mood:    Anxious   Affect:    Appropriate   Thought Content:  Clear   Thought Form:  Coherent  Logical   Insight:    Fair     Collateral Reports Completed:  Not Applicable    PLAN: (Homework, other)    See above note section.                                                     ________________________________________________________________________    Treatment Plan    Client's Name: Antonio Ford  YOB: 1970    Date: 8/20/2014    DSM-IV Diagnoses    AXIS I: 300.02(F41.1) FRANCISCO JAVIER  300.23(F40.10) Social Anxiety Disorder   F33.0 MDD recurrent Mild  Referral / Collaboration:  Collaboration was initiated with PCP MD.    Anticipated number of session or this episode of care: 75-80      MeasurableTreatment Goal(s) related to diagnosis / functional impairment(s)  Goal 1:Depression and anxiety: Client will decrease depressed mood and anxiety as evidenced by PHQ9 and GAD7 scores 4 and Under in the next 6 months. scoring 9/1/2015:   GAD7: 14. 1/10/2017: PHQ9:4, GAD7:3, 5/31/17:PHQ9:7, GAD7:8, 11/14/2017: PHQ9:5, GAD7:3, 1/12/2018: PHQ9:6, GAD7:7, 4/24/2018: PHQ9:4, GAD7:3,  6/29/2018:PHQ9: 5, 9/20/18: PHQ9: 3, GAD7: 4       I will know I've met my goal when I'm feeling less depressed and anxious, and happier in my relationship/my marriage is improving.    Objective #A (Client Action)  Status: Continued - Date(s): 11/2/2018  Client will identify 1-2 initial signs or symptoms of anxiety and utilize anxiety reduction techniques to decrease anxiety. Client will ID triggers for depression and anxiety and work towards decreasing reactivity to or eliminating stressor if possible. client will develop more effective coping skills to manage depression and anxiety symptoms, will develop healthy cognitive patterns and beliefs, will increase ability to function adaptively and will continue to take medications as prescribed / participate in supportive activities. Client will improve communication and assertiveness skills and learn to set boundaries with others.    Intervention(s)   Therapist will teach client how to ID body cues for anxiety, anxiety reduction techniques, how to ID triggers for depression and anxiety- decrease reactivity/eliminate, lifestyle changes to reduce depression and anxiety, communication skills, explore cognitive beliefs and help client to develop healthy cognitive patterns and beliefs. Grief counseling.       Client has reviewed and agreed to the above plan.      Debbie Schreiber

## 2018-11-16 ENCOUNTER — OFFICE VISIT (OUTPATIENT)
Dept: PSYCHOLOGY | Facility: CLINIC | Age: 48
End: 2018-11-16
Payer: COMMERCIAL

## 2018-11-16 DIAGNOSIS — F40.10 SOCIAL ANXIETY DISORDER: ICD-10-CM

## 2018-11-16 DIAGNOSIS — F41.1 GENERALIZED ANXIETY DISORDER: Primary | ICD-10-CM

## 2018-11-16 DIAGNOSIS — F33.0 MILD EPISODE OF RECURRENT MAJOR DEPRESSIVE DISORDER (H): ICD-10-CM

## 2018-11-16 PROCEDURE — 90834 PSYTX W PT 45 MINUTES: CPT | Performed by: MARRIAGE & FAMILY THERAPIST

## 2018-11-16 NOTE — MR AVS SNAPSHOT
MRN:[id number]                      After Visit Summary   11/16/2018    Antonio Ford    MRN: [id number]           Visit Information        Provider Department      11/16/2018 9:00 AM Debbie Bro [facility] Generic      Your next 10 appointments already scheduled     Dec 07, 2018  8:00 AM CST   Return Visit with Debbie ERIC HubbardBro   [facility] ([facility])    [address]  [facility] [address] [phone]            Dec 28, 2018 10:00 AM CST   Return Visit with Debbie Bro   [facility] ([facility])    [address]  [facility] [address] [phone]              MyChart Information     [employer] gives you secure access to your electronic health record. If you see a primary care provider, you can also send messages to your care team and make appointments. If you have questions, please call your primary care clinic.  If you do not have a primary care provider, please call [phone] and they will assist you.        Care EveryWhere ID     This is your Care EveryWhere ID. This could be used by other organizations to access your [place] medical records  [id number]        Equal Access to Services     SPEEDY ESQUEDA AH: Tonio Moralez, waaxda luleslieadaha, qaybta kaalmada cristiano, stephanie goodman. So [facility] [phone].    ATENCIÓN: Si habla español, tiene a villasenor disposición servicios gratuitos de asistencia lingüística. Llame al [phone].    We comply with applicable federal civil rights laws and [place] laws. We do not discriminate on the basis of race, color, national origin, age, disability, sex, sexual orientation, or gender identity.

## 2018-11-18 NOTE — PROGRESS NOTES
Progress Note    Client Name: Antonio Ford  Date: 11/16/2018       Service Type: Individual      Session Start Time: 9am  Session End Time: 9:45am   Session Length: 45 - 50     Session #: 115     Attendees: Client attended alone      PHQ-9 / FRANCISCO JAVIER-7 Due Date:  Answers for HPI/ROS submitted by the patient on 9/20/2018   If you checked off any problems, how difficult have these problems made it for you to do your work, take care of things at home, or get along with other people?: Somewhat difficult  PHQ9 TOTAL SCORE: 3, GAD7: 4  DATA    Treatment Objective(s) Addressed in This Session:  identify at least 2-3 triggers for anxiety         Progress on / Status of Treatment Objective(s) / Homework:  Satisfactory progress - ACTION (Actively working towards change); Intervened by reinforcing change plan / affirming steps taken continues to work on reducing anxiety    Intervention:   CBT, solution focused   Processed ongoing anxiety related to obtaining finances for a mortgage, considering proposing to girlfriend and buying a ring. Discussed that client is casually looking for another job as he does not feel [place] is a good fit for him. Discussed plans for Thanksgiving.         90-day Treatment Plan review completed: yes    Current Stressors / Issues:   financial, family, girlfriend's daughter, potentially buying a new home,medical  Medication Review:  No changes to current psychiatric medication(s)   Current Outpatient Prescriptions   Medication     amitriptyline (ELAVIL) 50 MG tablet     cetirizine (ZYRTEC) 10 MG tablet     DULoxetine (CYMBALTA) 30 MG EC capsule     gabapentin (NEURONTIN) 300 MG capsule     No current facility-administered medications for this visit.          Medication Compliance:  Yes    Changes in Health Issues:   None reported    Chemical Use Review:   Substance Use: Chemical use reviewed, no active concerns identified      Tobacco Use: No current tobacco  use.        ASSESSMENT: Current Emotional / Mental Status (status of significant symptoms):    Risk status (Self / Other harm or suicidal ideation)  Client denies current fears or concerns for personal safety.  Client denies current or recent suicidal ideation or behaviors.  Client denies current or recent homicidal ideation or behaviors.  Client denies current or recent self injurious behavior or ideation.  Client denies other safety concerns.  A safety and risk management plan has not been developed at this time, however client was given the after-hours number should there be a change in any of these risk factors.    Appearance:   Appropriate   Eye Contact:   Good   Psychomotor Behavior: Normal   Attitude:   Cooperative   Orientation:   All  Speech   Rate / Production: Normal    Volume:  Normal   Mood:    Anxious   Affect:    Appropriate   Thought Content:  Clear   Thought Form:  Coherent  Logical   Insight:    Fair     Collateral Reports Completed:  Not Applicable    PLAN: (Homework, other)    See above note section.                                                     ________________________________________________________________________    Treatment Plan    Client's Name: Antonio Ford  YOB: 1970    Date: 8/20/2014    DSM-IV Diagnoses    AXIS I: 300.02(F41.1) FRANCISCO JAVIER  300.23(F40.10) Social Anxiety Disorder   F33.0 MDD recurrent Mild  Referral / Collaboration:  Collaboration was initiated with PCP MD.    Anticipated number of session or this episode of care: 75-80      MeasurableTreatment Goal(s) related to diagnosis / functional impairment(s)  Goal 1:Depression and anxiety: Client will decrease depressed mood and anxiety as evidenced by PHQ9 and GAD7 scores 4 and Under in the next 6 months. scoring 9/1/2015:  GAD7: 14. 1/10/2017: PHQ9:4, GAD7:3, 5/31/17:PHQ9:7, GAD7:8, 11/14/2017: PHQ9:5, GAD7:3, 1/12/2018: PHQ9:6, GAD7:7, 4/24/2018: PHQ9:4, GAD7:3,  6/29/2018:PHQ9: 5, 9/20/18: PHQ9: 3, GAD7:  4       I will know I've met my goal when I'm feeling less depressed and anxious, and happier in my relationship/my marriage is improving.    Objective #A (Client Action)  Status: Continued - Date(s): 11/16/2018  Client will identify 1-2 initial signs or symptoms of anxiety and utilize anxiety reduction techniques to decrease anxiety. Client will ID triggers for depression and anxiety and work towards decreasing reactivity to or eliminating stressor if possible. client will develop more effective coping skills to manage depression and anxiety symptoms, will develop healthy cognitive patterns and beliefs, will increase ability to function adaptively and will continue to take medications as prescribed / participate in supportive activities. Client will improve communication and assertiveness skills and learn to set boundaries with others.    Intervention(s)   Therapist will teach client how to ID body cues for anxiety, anxiety reduction techniques, how to ID triggers for depression and anxiety- decrease reactivity/eliminate, lifestyle changes to reduce depression and anxiety, communication skills, explore cognitive beliefs and help client to develop healthy cognitive patterns and beliefs. Grief counseling.       Client has reviewed and agreed to the above plan.      Debbie Schreiber

## 2018-11-28 ENCOUNTER — OFFICE VISIT (OUTPATIENT)
Dept: URGENT CARE | Facility: RETAIL CLINIC | Age: 48
End: 2018-11-28
Payer: COMMERCIAL

## 2018-11-28 VITALS — TEMPERATURE: 97.5 F | SYSTOLIC BLOOD PRESSURE: 144 MMHG | HEART RATE: 74 BPM | DIASTOLIC BLOOD PRESSURE: 88 MMHG

## 2018-11-28 DIAGNOSIS — H66.002 ACUTE SUPPURATIVE OTITIS MEDIA OF LEFT EAR WITHOUT SPONTANEOUS RUPTURE OF TYMPANIC MEMBRANE, RECURRENCE NOT SPECIFIED: Primary | ICD-10-CM

## 2018-11-28 PROCEDURE — 99213 OFFICE O/P EST LOW 20 MIN: CPT | Performed by: PHYSICIAN ASSISTANT

## 2018-11-28 RX ORDER — AMOXICILLIN 875 MG
875 TABLET ORAL 2 TIMES DAILY
Qty: 14 TABLET | Refills: 0 | Status: SHIPPED | OUTPATIENT
Start: 2018-11-28 | End: 2018-12-05

## 2018-11-29 NOTE — PROGRESS NOTES
Chief Complaint   Patient presents with     Otalgia     left ear pain x 3 days, mostly constant pain, no fevers, nasal congestion x 5 days        SUBJECTIVE:  Antonio Ford is a 48 year old male who presents with left ear pain for 3 day(s).   Severity: moderate   Timing:still present  Additional symptoms include congestion.    History of recurrent otitis: no    Past Medical History:   Diagnosis Date     Allergic rhinitis, cause unspecified     Rhinitis, Allergic     Depressive disorder      Unspecified sinusitis (chronic)     Chronic sinusitis     Current Outpatient Prescriptions   Medication Sig Dispense Refill     amitriptyline (ELAVIL) 50 MG tablet Take 1 tablet (50 mg) by mouth At Bedtime 90 tablet 3     DULoxetine (CYMBALTA) 30 MG EC capsule Take 1 capsule (30 mg) by mouth 2 times daily 180 capsule 3     gabapentin (NEURONTIN) 300 MG capsule Take 3 capsules (900 mg) by mouth 3 times daily 270 capsule 11     cetirizine (ZYRTEC) 10 MG tablet Take 10 mg by mouth daily       History   Smoking Status     Never Smoker   Smokeless Tobacco     Never Used       ROS:   CONSTITUTIONAL:NEGATIVE for fever, chills  ENT/MOUTH: POSITIVE for ear pain left and nasal congestion and NEGATIVE for sore throat  RESP:NEGATIVE for significant cough or wheezing    OBJECTIVE:  /88 (BP Location: Left arm)  Pulse 74  Temp 97.5  F (36.4  C) (Oral)  The right TM is normal: no effusions, no erythema, and normal landmarks     The right auditory canal is normal and without drainage, edema or erythema  The left TM is bulging and erythematous  The left auditory canal is normal and without drainage, edema or erythema  Oropharynx exam is normal: no lesions, erythema, adenopathy or exudate.  GENERAL: no acute distress  EYES:  conjunctiva clear  NECK: supple, non-tender to palpation, no adenopathy noted  RESP: lungs clear to auscultation - no rales, rhonchi or wheezes  CV: regular rates and rhythm, normal S1 S2, no murmur noted  SKIN: no  suspicious lesions or rashes     ASSESSMENT:  (H66.002) Acute suppurative otitis media of left ear without spontaneous rupture of tympanic membrane, recurrence not specified  (primary encounter diagnosis)    PLAN:  Plan: amoxicillin (AMOXIL) 875 MG tablet  Take antibiotic as directed  Tylenol, motrin,  warm compresses next to ear, humidifier  Please follow up with primary care provider if not improving, worsening or new symptoms or for any adverse reactions to medications.      Pamela May PA-C  [facility]

## 2018-11-29 NOTE — PATIENT INSTRUCTIONS
Take antibiotic as directed with food for middle ear infection  Tylenol, motrin, decongestant for congestion/ear pressure, warm compresses next to ear, humidifier  Please follow up with primary care provider if not improving, worsening or new symptoms or for any adverse reactions to medications.

## 2018-12-03 ENCOUNTER — MYC MEDICAL ADVICE (OUTPATIENT)
Dept: FAMILY MEDICINE | Facility: OTHER | Age: 48
End: 2018-12-03

## 2018-12-04 NOTE — TELEPHONE ENCOUNTER
Patient read [employer] message. Closing encounter.     12/4/2018  8:54 AM Y Vickie Enamorado RN Patient Medical Advice Request

## 2018-12-04 NOTE — PROGRESS NOTES
SUBJECTIVE:   Antonio Ford is a 48 year old male who presents to clinic today for the following health issues:      HPI  Acute Illness   Acute illness concerns: ear infection  Onset: about a week and a half to two weeks.     Fever: no    Chills/Sweats: no    Headache (location?): no    Sinus Pressure:no    Conjunctivitis:  no    Ear Pain: YES: left-doesn't hurt as bad as it did when he was seen in  on 11/28, hurts a little bit and muffled sound    Rhinorrhea: no     Congestion: YES    Sore Throat: no      Cough: no    Wheeze: no    Decreased Appetite: no     Nausea: no     Vomiting: no     Diarrhea:  no     Dysuria/Freq.: no     Fatigue/Achiness: YES    Sick/Strep Exposure: no      Therapies Tried and outcome: Amoxicillin-may have helped the first day but hasn't changed, ibuprofen/tylenol.      Problem list and histories reviewed & adjusted, as indicated.  Additional history: as documented    Patient Active Problem List   Diagnosis     Allergic state     CARDIOVASCULAR SCREENING; LDL GOAL LESS THAN 160     Penile pain     Anemia of chronic disease     Generalized anxiety disorder     Chronic pelvic pain in male     Social anxiety disorder     Mild episode of recurrent major depressive disorder (H)     Lateral femoral cutaneous entrapment syndrome, left     Past Surgical History:   Procedure Laterality Date     C NASAL/SPHENOID SINUS ENDOSCOPY,DX  2005    nasal polypectomy as well     NERVE BLOCK PERIPHERAL  08/26/2013    [facility]     NERVE BLOCK PERIPHERAL  10/14/2013    [facility]     TONSILLECTOMY  1992     VASECTOMY         Social History   Substance Use Topics     Smoking status: Never Smoker     Smokeless tobacco: Never Used     Alcohol use Yes      Comment: occasionally      Family History   Problem Relation Age of Onset     Diabetes Maternal Grandmother 70     type 2      Cancer Paternal Grandmother      Cancer Maternal Aunt 30     leukemia     Heart Disease Maternal Grandfather 72      C.A.D. No family hx of          Current Outpatient Prescriptions   Medication Sig Dispense Refill     amitriptyline (ELAVIL) 50 MG tablet Take 1 tablet (50 mg) by mouth At Bedtime 90 tablet 3     amoxicillin (AMOXIL) 875 MG tablet Take 1 tablet (875 mg) by mouth 2 times daily for 7 days 14 tablet 0     cefdinir (OMNICEF) 300 MG capsule Take 1 capsule (300 mg) by mouth 2 times daily 20 capsule 0     DULoxetine (CYMBALTA) 30 MG EC capsule Take 1 capsule (30 mg) by mouth 2 times daily 180 capsule 3     gabapentin (NEURONTIN) 300 MG capsule Take 3 capsules (900 mg) by mouth 3 times daily 270 capsule 11     cetirizine (ZYRTEC) 10 MG tablet Take 10 mg by mouth daily         ROS:  Constitutional, HEENT, cardiovascular, pulmonary, gi and gu systems are negative, except as otherwise noted.    OBJECTIVE:     /84  Pulse 80  Temp 97.1  F (36.2  C) (Temporal)  Resp 16  Wt 271 lb 9.6 oz (123.2 kg)  BMI 37.35 kg/m2  Body mass index is 37.35 kg/(m^2).  GENERAL: healthy, alert and no distress  EYES: Eyes grossly normal to inspection, PERRL and conjunctivae and sclerae normal  HENT: normal cephalic/atraumatic, right ear: erythematous, bulging membrane and mucopurulent effusion, left ear: clear effusion and erythematous, nasal mucosa edematous , rhinorrhea clear, oropharynx clear and oral mucous membranes moist  NECK: no adenopathy, no asymmetry, masses, or scars and thyroid normal to palpation  RESP: lungs clear to auscultation - no rales, rhonchi or wheezes  CV: regular rate and rhythm, normal S1 S2, no S3 or S4, no murmur, click or rub, no peripheral edema and peripheral pulses strong  MS: no gross musculoskeletal defects noted, no edema  SKIN: no suspicious lesions or rashes    Diagnostic Test Results:  none     ASSESSMENT/PLAN:       ICD-10-CM    1. Acute suppurative otitis media of right ear without spontaneous rupture of tympanic membrane, recurrence not specified H66.001 cefdinir (OMNICEF) 300 MG capsule        Left ear looks as it should after having an acute infection and being treated properly with antibiotics, however the right TM appears infected now.  Recommended a second round of antibiotics and switched the class.  Also recommended he treat his nasal congestion better with nasal steroids, nasal saline washes, decongestants. Ibuprofen/tylenol as needed for pain.  Discussed that the plugged feeling can take longer to resolve.      Follow-up if not improving over the next week, sooner if worse or new concerns.     Options for treatment and follow-up care were reviewed with the patient and/or guardian. Patient and/or guardian engaged in the decision making process and verbalized understanding of the options discussed and agreed with the final plan.     Karlo Nunez PA-C  [facility]

## 2018-12-05 ENCOUNTER — OFFICE VISIT (OUTPATIENT)
Dept: FAMILY MEDICINE | Facility: OTHER | Age: 48
End: 2018-12-05
Payer: COMMERCIAL

## 2018-12-05 VITALS
TEMPERATURE: 97.1 F | HEART RATE: 80 BPM | SYSTOLIC BLOOD PRESSURE: 122 MMHG | WEIGHT: 271.6 LBS | DIASTOLIC BLOOD PRESSURE: 84 MMHG | BODY MASS INDEX: 37.35 KG/M2 | RESPIRATION RATE: 16 BRPM

## 2018-12-05 DIAGNOSIS — H66.001 ACUTE SUPPURATIVE OTITIS MEDIA OF RIGHT EAR WITHOUT SPONTANEOUS RUPTURE OF TYMPANIC MEMBRANE, RECURRENCE NOT SPECIFIED: Primary | ICD-10-CM

## 2018-12-05 PROCEDURE — 99213 OFFICE O/P EST LOW 20 MIN: CPT | Performed by: PHYSICIAN ASSISTANT

## 2018-12-05 RX ORDER — CEFDINIR 300 MG/1
300 CAPSULE ORAL 2 TIMES DAILY
Qty: 20 CAPSULE | Refills: 0 | Status: SHIPPED | OUTPATIENT
Start: 2018-12-05 | End: 2019-10-24

## 2018-12-05 ASSESSMENT — PAIN SCALES - GENERAL: PAINLEVEL: MODERATE PAIN (4)

## 2018-12-05 NOTE — MR AVS SNAPSHOT
"              After Visit Summary   12/5/2018    Antonio Ford    MRN: [id number]           Patient Information     Date Of Birth          1970        Visit Information        Provider Department      12/5/2018 8:00 AM Karlo Nunez PA-C [facility]        Today's Diagnoses     Acute suppurative otitis media of right ear without spontaneous rupture of tympanic membrane, recurrence not specified    -  1      Care Instructions    1. Antibiotic: Take the full course of the antibiotic.  Can consider adding in a probiotic or yogurt if GI upset is a concern. Follow-up if any problems with the antibiotic.    2. Nasal Saline: At the pharmacy you can find a \"Nettipot\" or NeilMed Nasal Rinse.  This is a salt solution that you mix with warm water.  I want you to perform nasal saline washes at least twice daily while you are sick.  You can do this anytime you feel like you are developing nasal congestion.  To properly utilize be sure you are leaning forward as far as you can and either tilt or squeeze slowly.  In the beginning this can be difficult to get to work properly but as the congestion is improved it will work easier.  If you don't use these properly you can feel as if you're drowning.     3. Nasal Steroid: Fluticasone/Flonase or Nasacort, These are OTC medications for allergies.  They are steroid sprays that are localized to the nose so no systemic effects. Two sprays each nostril once daily - allergist noted it is most effective if used before bed.  Ok to continue to use nasal saline as well.  When you spray it in the nose it should be up and out towards the eye on the side you are spraying the medication.  You should not taste the medication and it should not drip out the nose. This will decrease inflammation and also nasal mucous production.  You can also use this anytime you are developing nasal congestion.  Ok in the future to start these as soon as you feel you are developing nasal " congestion, sinus pressure, increased nasal drainage.    4. IMPORTANT: When using other nasal inhaled products especially 12 hour sprays such as Afrin you should only use for a max of approximately 3 days, longer use could result in rebound congestion (congestion that develops because you're off the medication).    5. If no blood pressure issues recommend Mucinex DM Max, if issues with your blood pressure ok to just use plain mucinex product.    6. Heat packs on sinuses    7. Ibuprofen as needed      Follow-up if symptoms worsen or do not resolve.  Feel free to call with any questions or concerns.              Follow-ups after your visit        Follow-up notes from your care team     Return in about 1 week (around 12/12/2018) for if not improving, worse or new concerns. .      Your next 10 appointments already scheduled     Dec 28, 2018 10:00 AM CST   Return Visit with Debbie Bro   [facility]    [address]  [facility] [address]   [phone]              Who to contact     If you have questions or need follow up information about today's clinic visit or your schedule please contact [facility] directly at [phone].  Normal or non-critical lab and imaging results will be communicated to you by MyChart, letter or phone within 4 business days after the clinic has received the results. If you do not hear from us within 7 days, please contact the clinic through [facility] or phone. If you have a critical or abnormal lab result, we will notify you by phone as soon as possible.  Submit refill requests through [address] or call your pharmacy and they will forward the refill request to us. Please allow 3 business days for your refill to be completed.          Additional Information About Your Visit        [employer] Information     [address] gives you secure access to your electronic health record. If you see a primary care provider, you can also  send messages to your care team and make appointments. If you have questions, please call your primary care clinic.  If you do not have a primary care provider, please call [phone] and they will assist you.        Care EveryWhere ID     This is your Care EveryWhere ID. This could be used by other organizations to access your [place] medical records  [id number]        Your Vitals Were     Pulse Temperature Respirations BMI (Body Mass Index)          80 97.1  F (36.2  C) (Temporal) 16 37.35 kg/m2         Blood Pressure from Last 3 Encounters:   12/05/18 122/84   11/28/18 144/88   09/20/18 112/76    Weight from Last 3 Encounters:   12/05/18 271 lb 9.6 oz (123.2 kg)   09/20/18 259 lb (117.5 kg)   02/23/18 250 lb (113.4 kg)              Today, you had the following     No orders found for display         Today's Medication Changes          These changes are accurate as of 12/5/18  8:17 AM.  If you have any questions, ask your nurse or doctor.               Start taking these medicines.        Dose/Directions    cefdinir 300 MG capsule   Commonly known as:  OMNICEF   Used for:  Acute suppurative otitis media of right ear without spontaneous rupture of tympanic membrane, recurrence not specified   Started by:  Karlo Nunez PA-C        Dose:  300 mg   Take 1 capsule (300 mg) by mouth 2 times daily   Quantity:  20 capsule   Refills:  0            Where to get your medicines      These medications were sent to [facility] [place] [id number] [address]     Phone:  [phone]     cefdinir 300 MG capsule                Primary Care Provider Office Phone # Fax #    Josh Morales PA-C [phone]       [address]  [facility] [address]        Equal Access to Services     SPEEDY ESQUEDA AH: Tonio Moralez, ian solis, qaybta kaalmastephanie barrios. So [facility]  [phone].    ATENCIÓN: Si eileen العلي, tiene a villasenor disposición servicios gratuitos de asistencia lingüística. Gail laguerre [phone].    We comply with applicable federal civil rights laws and [place] laws. We do not discriminate on the basis of race, color, national origin, age, disability, sex, sexual orientation, or gender identity.            Thank you!     Thank you for choosing [facility]  for your care. Our goal is always to provide you with excellent care. Hearing back from our patients is one way we can continue to improve our services. Please take a few minutes to complete the written survey that you may receive in the mail after your visit with us. Thank you!             Your Updated Medication List - Protect others around you: Learn how to safely use, store and throw away your medicines at www.disposemymeds.org.          This list is accurate as of 12/5/18  8:17 AM.  Always use your most recent med list.                   Brand Name Dispense Instructions for use Diagnosis    amitriptyline 50 MG tablet    ELAVIL    90 tablet    Take 1 tablet (50 mg) by mouth At Bedtime    Penile pain       amoxicillin 875 MG tablet    AMOXIL    14 tablet    Take 1 tablet (875 mg) by mouth 2 times daily for 7 days    Acute suppurative otitis media of left ear without spontaneous rupture of tympanic membrane, recurrence not specified       cefdinir 300 MG capsule    OMNICEF    20 capsule    Take 1 capsule (300 mg) by mouth 2 times daily    Acute suppurative otitis media of right ear without spontaneous rupture of tympanic membrane, recurrence not specified       cetirizine 10 MG tablet    zyrTEC     Take 10 mg by mouth daily        DULoxetine 30 MG capsule    CYMBALTA    180 capsule    Take 1 capsule (30 mg) by mouth 2 times daily    Generalized anxiety disorder, Social anxiety disorder, Mild episode of recurrent major depressive disorder (H)       gabapentin 300 MG capsule    NEURONTIN    270 capsule     Take 3 capsules (900 mg) by mouth 3 times daily    Penile pain

## 2018-12-05 NOTE — PATIENT INSTRUCTIONS
"1. Antibiotic: Take the full course of the antibiotic.  Can consider adding in a probiotic or yogurt if GI upset is a concern. Follow-up if any problems with the antibiotic.    2. Nasal Saline: At the pharmacy you can find a \"Nettipot\" or NeilMed Nasal Rinse.  This is a salt solution that you mix with warm water.  I want you to perform nasal saline washes at least twice daily while you are sick.  You can do this anytime you feel like you are developing nasal congestion.  To properly utilize be sure you are leaning forward as far as you can and either tilt or squeeze slowly.  In the beginning this can be difficult to get to work properly but as the congestion is improved it will work easier.  If you don't use these properly you can feel as if you're drowning.     3. Nasal Steroid: Fluticasone/Flonase or Nasacort, These are OTC medications for allergies.  They are steroid sprays that are localized to the nose so no systemic effects. Two sprays each nostril once daily - allergist noted it is most effective if used before bed.  Ok to continue to use nasal saline as well.  When you spray it in the nose it should be up and out towards the eye on the side you are spraying the medication.  You should not taste the medication and it should not drip out the nose. This will decrease inflammation and also nasal mucous production.  You can also use this anytime you are developing nasal congestion.  Ok in the future to start these as soon as you feel you are developing nasal congestion, sinus pressure, increased nasal drainage.    4. IMPORTANT: When using other nasal inhaled products especially 12 hour sprays such as Afrin you should only use for a max of approximately 3 days, longer use could result in rebound congestion (congestion that develops because you're off the medication).    5. If no blood pressure issues recommend Mucinex DM Max, if issues with your blood pressure ok to just use plain mucinex product.    6. Heat packs " on sinuses    7. Ibuprofen as needed      Follow-up if symptoms worsen or do not resolve.  Feel free to call with any questions or concerns.

## 2019-01-25 ENCOUNTER — OFFICE VISIT (OUTPATIENT)
Dept: PSYCHOLOGY | Facility: CLINIC | Age: 49
End: 2019-01-25
Payer: COMMERCIAL

## 2019-01-25 DIAGNOSIS — F41.1 GENERALIZED ANXIETY DISORDER: Primary | ICD-10-CM

## 2019-01-25 DIAGNOSIS — F40.10 SOCIAL ANXIETY DISORDER: ICD-10-CM

## 2019-01-25 PROCEDURE — 90834 PSYTX W PT 45 MINUTES: CPT | Performed by: MARRIAGE & FAMILY THERAPIST

## 2019-01-25 NOTE — PROGRESS NOTES
Progress Note    Client Name: Antonio Ford  Date: 1/25/2019       Service Type: Individual      Session Start Time: 8am  Session End Time: 8:45am   Session Length: 45 - 50     Session #: 116     Attendees: Client attended alone      PHQ-9 / FRANCISCO JAVIER-7 Due Date: declined  Answers for HPI/ROS submitted by the patient on 9/20/2018   If you checked off any problems, how difficult have these problems made it for you to do your work, take care of things at home, or get along with other people?: Somewhat difficult  PHQ9 TOTAL SCORE: 3, GAD7: 4  DATA    Treatment Objective(s) Addressed in This Session:  use at least 2-3 coping skills for anxiety management in the next 4 weeks   ID anxiety triggers      Progress on / Status of Treatment Objective(s) / Homework:  Satisfactory progress - ACTION (Actively working towards change); Intervened by reinforcing change plan / affirming steps taken continues to work on reducing anxiety, taking steps to make changes    Intervention:   CBT, solution focused   Processed ongoing anxiety related job, job seeking, planning future with girlfriend(buying a house, proposing, planning a wedding) and girlfriend's daughter's response, and upcoming changes to children's schooling.  Encouraged client to use grounding techniques when feeling anxious, refute anxious thoughts, use self affirmations, and consider what is true in the situation/facts.       90-day Treatment Plan review completed: yes    Current Stressors / Issues:   financial, family, girlfriend's daughter, potentially buying a new home, job seeking, medical  Medication Review:  No changes to current psychiatric medication(s)   Current Outpatient Medications   Medication     amitriptyline (ELAVIL) 50 MG tablet     cefdinir (OMNICEF) 300 MG capsule     cetirizine (ZYRTEC) 10 MG tablet     DULoxetine (CYMBALTA) 30 MG EC capsule     gabapentin (NEURONTIN) 300 MG capsule     No current  facility-administered medications for this visit.          Medication Compliance:  Yes    Changes in Health Issues:   None reported    Chemical Use Review:   Substance Use: Chemical use reviewed, no active concerns identified      Tobacco Use: No current tobacco use.        ASSESSMENT: Current Emotional / Mental Status (status of significant symptoms):    Risk status (Self / Other harm or suicidal ideation)  Client denies current fears or concerns for personal safety.  Client denies current or recent suicidal ideation or behaviors.  Client denies current or recent homicidal ideation or behaviors.  Client denies current or recent self injurious behavior or ideation.  Client denies other safety concerns.  A safety and risk management plan has not been developed at this time, however client was given the after-hours number should there be a change in any of these risk factors.    Appearance:   Appropriate   Eye Contact:   Good   Psychomotor Behavior: Normal   Attitude:   Cooperative   Orientation:   All  Speech   Rate / Production: Normal    Volume:  Normal   Mood:    Anxious   Affect:    Appropriate   Thought Content:  Clear   Thought Form:  Coherent  Logical   Insight:    Fair     Collateral Reports Completed:  Not Applicable    PLAN: (Homework, other)    Encouraged client to use grounding techniques when feeling anxious, refute anxious thoughts, use self affirmations, and consider what is true in the situation/facts.                                                        ________________________________________________________________________    Treatment Plan    Client's Name: Antonio Ford  YOB: 1970    Date: 8/20/2014    DSM-IV Diagnoses    AXIS I: 300.02(F41.1) FRANCISCO JAVIER  300.23(F40.10) Social Anxiety Disorder   F33.0 MDD recurrent Mild  Referral / Collaboration:  Collaboration was initiated with PCP MD.    Anticipated number of session or this episode of care: 75-80      MeasurableTreatment Goal(s)  related to diagnosis / functional impairment(s)  Goal 1:Depression and anxiety: Client will decrease depressed mood and anxiety as evidenced by PHQ9 and GAD7 scores 4 and Under in the next 6 months. scoring 9/1/2015:  GAD7: 14. 1/10/2017: PHQ9:4, GAD7:3, 5/31/17:PHQ9:7, GAD7:8, 11/14/2017: PHQ9:5, GAD7:3, 1/12/2018: PHQ9:6, GAD7:7, 4/24/2018: PHQ9:4, GAD7:3,  6/29/2018:PHQ9: 5, 9/20/18: PHQ9: 3, GAD7: 4       I will know I've met my goal when I'm feeling less depressed and anxious, and happier in my relationship/my marriage is improving.    Objective #A (Client Action)  Status: Continued - Date(s): 1/25/2019  Client will identify 1-2 initial signs or symptoms of anxiety and utilize anxiety reduction techniques to decrease anxiety. Client will ID triggers for depression and anxiety and work towards decreasing reactivity to or eliminating stressor if possible. client will develop more effective coping skills to manage depression and anxiety symptoms, will develop healthy cognitive patterns and beliefs, will increase ability to function adaptively and will continue to take medications as prescribed / participate in supportive activities. Client will improve communication and assertiveness skills and learn to set boundaries with others.    Intervention(s)   Therapist will teach client how to ID body cues for anxiety, anxiety reduction techniques, how to ID triggers for depression and anxiety- decrease reactivity/eliminate, lifestyle changes to reduce depression and anxiety, communication skills, explore cognitive beliefs and help client to develop healthy cognitive patterns and beliefs. Grief counseling.       Client has reviewed and agreed to the above plan.      Debbie Schreiber

## 2019-03-22 ENCOUNTER — OFFICE VISIT (OUTPATIENT)
Dept: PSYCHOLOGY | Facility: CLINIC | Age: 49
End: 2019-03-22
Payer: COMMERCIAL

## 2019-03-22 DIAGNOSIS — F40.10 SOCIAL ANXIETY DISORDER: ICD-10-CM

## 2019-03-22 DIAGNOSIS — F33.0 MILD EPISODE OF RECURRENT MAJOR DEPRESSIVE DISORDER (H): ICD-10-CM

## 2019-03-22 DIAGNOSIS — F41.1 GENERALIZED ANXIETY DISORDER: Primary | ICD-10-CM

## 2019-03-22 PROCEDURE — 90834 PSYTX W PT 45 MINUTES: CPT | Performed by: MARRIAGE & FAMILY THERAPIST

## 2019-03-22 ASSESSMENT — PATIENT HEALTH QUESTIONNAIRE - PHQ9
10. IF YOU CHECKED OFF ANY PROBLEMS, HOW DIFFICULT HAVE THESE PROBLEMS MADE IT FOR YOU TO DO YOUR WORK, TAKE CARE OF THINGS AT HOME, OR GET ALONG WITH OTHER PEOPLE: SOMEWHAT DIFFICULT
SUM OF ALL RESPONSES TO PHQ QUESTIONS 1-9: 7
SUM OF ALL RESPONSES TO PHQ QUESTIONS 1-9: 7

## 2019-03-22 ASSESSMENT — ANXIETY QUESTIONNAIRES
GAD7 TOTAL SCORE: 3
GAD7 TOTAL SCORE: 3
3. WORRYING TOO MUCH ABOUT DIFFERENT THINGS: SEVERAL DAYS
2. NOT BEING ABLE TO STOP OR CONTROL WORRYING: SEVERAL DAYS
7. FEELING AFRAID AS IF SOMETHING AWFUL MIGHT HAPPEN: NOT AT ALL
5. BEING SO RESTLESS THAT IT IS HARD TO SIT STILL: NOT AT ALL
1. FEELING NERVOUS, ANXIOUS, OR ON EDGE: SEVERAL DAYS
6. BECOMING EASILY ANNOYED OR IRRITABLE: NOT AT ALL
4. TROUBLE RELAXING: NOT AT ALL
GAD7 TOTAL SCORE: 3
7. FEELING AFRAID AS IF SOMETHING AWFUL MIGHT HAPPEN: NOT AT ALL

## 2019-03-22 NOTE — Clinical Note
Srinivasa-One of my long time client's may opt to come see you, but is unsure at this time. Struggled in the past with social anxiety, FRANCISCO JAVIER, and depression. He is in maintenence at this point and likes to have a once a month person for accountability. He plans to consider options in the next couple weeks and let us know if he'd like to transfer to you. He has been a great client that you will enjoy if you have the opportunity!Debbie Bro MA, [facility]

## 2019-03-22 NOTE — PROGRESS NOTES
Discharge Summary  Multiple Sessions    Client Name: Antonio Ford MRN#: [id number] YOB: 1970    Discharge Date:   March 22, 2019      Service Type: Individual      Session Start Time: 8am  Session End Time: 8:50am      Session Length: 45 - 50     Session #: 117     Attendees: Client attended alone    Focus of Treatment Objective(s):  Client's presenting concerns included: Depressed Mood - related to divorce  Anxiety - FRANCISCO JAVIER, social anxiety  Relational Problems related to: Conflict or difficulties with partner/spouse and divorce  Stage of Change at time of Discharge: MAINTENANCE (Working to maintain change, with risk of relapse)  Summary of today's visit: Discussed progress made since starting counseling, and that he is maintaining gains, and overall coping well with present stressors: upcoming marriage/blend of 2 families/moving into one home together. Discussed some differing views on parenting girlfriend's youngest son- suggested they look into [employer] or [employer]. They are finishing out [employer] at their [employer] and on the same page financially. Discussed area providers for [facility]: premarital and encouraged client to talk to his  about this. Discussed option to consider seeing another [facility] provider while in the processing of heading towards marriage and making changes, however client would like some time to consider options.     Medication Adherence:  Yes  Current Outpatient Medications   Medication     amitriptyline (ELAVIL) 50 MG tablet     cefdinir (OMNICEF) 300 MG capsule     cetirizine (ZYRTEC) 10 MG tablet     DULoxetine (CYMBALTA) 30 MG EC capsule     gabapentin (NEURONTIN) 300 MG capsule     No current facility-administered medications for this visit.        Chemical Use:  No    Assessment: Current Emotional / Mental Status (status of significant symptoms):    Risk status (Self / Other harm or suicidal  ideation)  Client denies current fears or concerns for personal safety.  Client denies current or recent suicidal ideation or behaviors.  Client denies current or recent homicidal ideation or behaviors.  Client denies current or recent self injurious behavior or ideation.  Client denies other safety concerns.  A safety and risk management plan has not been developed at this time, however client was given the after-hours number should there be a change in any of these risk factors.    Appearance:   Appropriate   Eye Contact:   Good   Psychomotor Behavior: Normal   Attitude:   Cooperative   Orientation:   All  Speech   Rate / Production: Normal    Volume:  Normal   Mood:    Anxious  Normal Sad   Affect:    Appropriate   Thought Content:  Clear   Thought Form:  Coherent  Logical   Insight:   Good     Answers for HPI/ROS submitted by the patient on 3/22/2019   If you checked off any problems, how difficult have these problems made it for you to do your work, take care of things at home, or get along with other people?: Somewhat difficult  PHQ9 TOTAL SCORE: 7  FRANCISCO JAVIER 7 TOTAL SCORE: 3    WHODAS 2.0 3/22/2019   Standing 1   Household Responsibilities 2   Task Learning 1   Community Activities 2   Emotional Effect of Health 1   Concentration 1   Walking 1   Washing 1   Dressing 1   New People 2   Friendships 1   Work 2   Total Score 16   S1 Standing for long periods such as 30 minutes None   S2 Taking care of your household responsibilities Mild   S3 Learning a new task, for example, learning how to get to a new place? None   S4 Joining in community activities (for example, festivities, Buddhism or other activities) in the same way as anyone else can? Mild   S5 How much have you been emotionally affected by your health problems? None   S6 Concentrating on doing something for ten minutes? None   S7 Walking a long distance such as a kilometre  None   S8 Washing your whole body? None   S9 Getting dressed? None   S10 Dealing with  people you do not know? Mild   S11 Maintaining a friendship? None   S12 Your day-to-day work? Mild   WHODAS 2.0 12 Item scoring 16       DSM5 Diagnoses: (Sustained by DSM5 Criteria Listed Above)  Diagnoses: 300.02(F41.1) FRANCISCO JAVIER  300.23(F40.10) Social Anxiety Disorder   F33.0 MDD recurrent Mild  Psychosocial & Contextual Factors: upcoming marriage to girlfriend/blending of families/move, family  WHODAS 2.0 (12 item) Score: 16    Reason for Discharge:  Provider is leaving the clinic. Discussed that overall client is doing well but may like to see a provider in [facility] every 3-4 wks for maintinence. Client plans to consider whether he plans to move forward with seeing another provider. Suggested client consider seeing Srinivasa Gruber.       Aftercare Plan:  Client may resume counseling services at any time in the future by calling the [facility] Intake Office, [phone].      Debbie Schreiber                                        ______________________________________________________________________     Treatment Plan     Client's Name: Antonio Ford                        YOB: 1970     Date: 8/20/2014     DSM-IV Diagnoses                  AXIS I: 300.02(F41.1) FRANCISCO JAVIER  300.23(F40.10) Social Anxiety Disorder   F33.0 MDD recurrent Mild  Referral / Collaboration:  Collaboration was initiated with PCP MD.     Anticipated number of session or this episode of care: 75-80        MeasurableTreatment Goal(s) related to diagnosis / functional impairment(s)  Goal 1:Depression and anxiety: Client will decrease depressed mood and anxiety as evidenced by PHQ9 and GAD7 scores 4 and Under in the next 6 months. scoring 9/1/2015:  GAD7: 14. 1/10/2017: PHQ9:4, GAD7:3, 5/31/17:PHQ9:7, GAD7:8, 11/14/2017: PHQ9:5, GAD7:3, 1/12/2018: PHQ9:6, GAD7:7, 4/24/2018: PHQ9:4, GAD7:3,  6/29/2018:PHQ9: 5, 9/20/18: PHQ9: 3, GAD7: 4, 3/22/2019: PHQ9: 7, GAD7: 3   I will know I've met my goal when I'm feeling less depressed and anxious, and happier in  my relationship/my marriage is improving.    Objective #A (Client Action)  Status: Completed - Date(s): 3/22/2019  Client will identify 1-2 initial signs or symptoms of anxiety and utilize anxiety reduction techniques to decrease anxiety. Client will ID triggers for depression and anxiety and work towards decreasing reactivity to or eliminating stressor if possible. client will develop more effective coping skills to manage depression and anxiety symptoms, will develop healthy cognitive patterns and beliefs, will increase ability to function adaptively and will continue to take medications as prescribed / participate in supportive activities. Client will improve communication and assertiveness skills and learn to set boundaries with others.    Intervention(s)   Therapist will teach client how to ID body cues for anxiety, anxiety reduction techniques, how to ID triggers for depression and anxiety- decrease reactivity/eliminate, lifestyle changes to reduce depression and anxiety, communication skills, explore cognitive beliefs and help client to develop healthy cognitive patterns and beliefs. Grief counseling.         Client has reviewed and agreed to the above plan.        Debbie Schreiber

## 2019-03-23 ASSESSMENT — PATIENT HEALTH QUESTIONNAIRE - PHQ9: SUM OF ALL RESPONSES TO PHQ QUESTIONS 1-9: 7

## 2019-03-23 ASSESSMENT — ANXIETY QUESTIONNAIRES: GAD7 TOTAL SCORE: 3

## 2019-10-12 DIAGNOSIS — F40.10 SOCIAL ANXIETY DISORDER: ICD-10-CM

## 2019-10-12 DIAGNOSIS — N48.89 PENILE PAIN: ICD-10-CM

## 2019-10-12 DIAGNOSIS — F41.1 GENERALIZED ANXIETY DISORDER: ICD-10-CM

## 2019-10-12 DIAGNOSIS — F33.0 MILD EPISODE OF RECURRENT MAJOR DEPRESSIVE DISORDER (H): ICD-10-CM

## 2019-10-14 RX ORDER — DULOXETIN HYDROCHLORIDE 30 MG/1
CAPSULE, DELAYED RELEASE ORAL
Qty: 60 CAPSULE | Refills: 0 | Status: SHIPPED | OUTPATIENT
Start: 2019-10-14 | End: 2019-10-24

## 2019-10-14 RX ORDER — GABAPENTIN 300 MG/1
CAPSULE ORAL
Qty: 270 CAPSULE | Refills: 0 | Status: SHIPPED | OUTPATIENT
Start: 2019-10-14 | End: 2019-10-24

## 2019-10-14 RX ORDER — AMITRIPTYLINE HYDROCHLORIDE 50 MG/1
TABLET ORAL
Qty: 60 TABLET | Refills: 0 | Status: SHIPPED | OUTPATIENT
Start: 2019-10-14 | End: 2019-10-24

## 2019-10-14 NOTE — TELEPHONE ENCOUNTER
Pending Prescriptions:                       Disp   Refills    gabapentin (NEURONTIN) 300 MG capsule [Ph*270 ca*10           Sig: TAKE THREE CAPSULES BY MOUTH THREE TIMES DAILY        Last Written Prescription Date:  9/20/18  Last Fill Quantity: 270,   # refills: 11  Last Office Visit: 12/5/18  Future Office visit:       Routing refill request to provider for review/approval because:  Drug not on the [facility], [facility] or [facility] refill protocol or controlled substance         DULoxetine (CYMBALTA) 30 MG capsule [Phar*180 ca*2            Sig: TAKE ONE CAPSULE BY MOUTH TWICE DAILY     PHQ-9 SCORE 6/29/2018 9/20/2018 3/22/2019   PHQ-9 Total Score - - -   PHQ-9 Total Score MyChart 5 (Mild depression) 3 (Minimal depression) 7 (Mild depression)   PHQ-9 Total Score 5 3 7     Medication is being filled for 1 time refill only due to:  Patient needs labs PHQ9.      amitriptyline (ELAVIL) 50 MG tablet [Phar*90 tab*2            Sig: TAKE ONE TABLET BY MOUTH AT BEDTIME    Prescription approved per [facility] Refill Protocol.      Hanna Joe, RN, BSN

## 2019-10-16 NOTE — PROGRESS NOTES
"Subjective     Antonio Ford is a 49 year old male who presents to clinic today for the following health issues:    HPI   {SUPERLIST (Optional):857036}  {additonal problems for provider to add (Optional):868683}    {HIST REVIEW/ LINKS 2 (Optional):283390}    Reviewed and updated as needed this visit by Provider         Review of Systems   {ROS COMP (Optional):490447}      Objective    There were no vitals taken for this visit.  There is no height or weight on file to calculate BMI.  Physical Exam   {Exam List (Optional):266788}    {Diagnostic Test Results (Optional):224062::\"Diagnostic Test Results:\",\"Labs reviewed in Epic\"}        {PROVIDER CHARTING PREFERENCE:596917}    "

## 2019-10-24 ENCOUNTER — OFFICE VISIT (OUTPATIENT)
Dept: FAMILY MEDICINE | Facility: OTHER | Age: 49
End: 2019-10-24
Payer: COMMERCIAL

## 2019-10-24 VITALS
HEIGHT: 71 IN | DIASTOLIC BLOOD PRESSURE: 86 MMHG | BODY MASS INDEX: 38.22 KG/M2 | TEMPERATURE: 98 F | HEART RATE: 85 BPM | OXYGEN SATURATION: 97 % | SYSTOLIC BLOOD PRESSURE: 124 MMHG | WEIGHT: 273 LBS | RESPIRATION RATE: 16 BRPM

## 2019-10-24 DIAGNOSIS — J30.2 SEASONAL ALLERGIC RHINITIS, UNSPECIFIED TRIGGER: ICD-10-CM

## 2019-10-24 DIAGNOSIS — E66.09 CLASS 2 OBESITY DUE TO EXCESS CALORIES WITHOUT SERIOUS COMORBIDITY WITH BODY MASS INDEX (BMI) OF 38.0 TO 38.9 IN ADULT: ICD-10-CM

## 2019-10-24 DIAGNOSIS — R10.2 CHRONIC PELVIC PAIN IN MALE: ICD-10-CM

## 2019-10-24 DIAGNOSIS — F40.10 SOCIAL ANXIETY DISORDER: ICD-10-CM

## 2019-10-24 DIAGNOSIS — F41.1 GENERALIZED ANXIETY DISORDER: ICD-10-CM

## 2019-10-24 DIAGNOSIS — E66.812 CLASS 2 OBESITY DUE TO EXCESS CALORIES WITHOUT SERIOUS COMORBIDITY WITH BODY MASS INDEX (BMI) OF 38.0 TO 38.9 IN ADULT: ICD-10-CM

## 2019-10-24 DIAGNOSIS — F33.0 MILD EPISODE OF RECURRENT MAJOR DEPRESSIVE DISORDER (H): ICD-10-CM

## 2019-10-24 DIAGNOSIS — Z00.00 ENCOUNTER FOR ROUTINE ADULT HEALTH EXAMINATION WITHOUT ABNORMAL FINDINGS: Primary | ICD-10-CM

## 2019-10-24 DIAGNOSIS — G89.29 CHRONIC PELVIC PAIN IN MALE: ICD-10-CM

## 2019-10-24 DIAGNOSIS — E78.5 HYPERLIPIDEMIA LDL GOAL <160: ICD-10-CM

## 2019-10-24 LAB
ANION GAP SERPL CALCULATED.3IONS-SCNC: 5 MMOL/L (ref 3–14)
BUN SERPL-MCNC: 20 MG/DL (ref 7–30)
CALCIUM SERPL-MCNC: 9 MG/DL (ref 8.5–10.1)
CHLORIDE SERPL-SCNC: 109 MMOL/L (ref 94–109)
CHOLEST SERPL-MCNC: 247 MG/DL
CO2 SERPL-SCNC: 27 MMOL/L (ref 20–32)
CREAT SERPL-MCNC: 0.88 MG/DL (ref 0.66–1.25)
GFR SERPL CREATININE-BSD FRML MDRD: >90 ML/MIN/{1.73_M2}
GLUCOSE SERPL-MCNC: 99 MG/DL (ref 70–99)
HDLC SERPL-MCNC: 38 MG/DL
LDLC SERPL CALC-MCNC: 151 MG/DL
NONHDLC SERPL-MCNC: 209 MG/DL
POTASSIUM SERPL-SCNC: 4.3 MMOL/L (ref 3.4–5.3)
SODIUM SERPL-SCNC: 141 MMOL/L (ref 133–144)
TRIGL SERPL-MCNC: 291 MG/DL

## 2019-10-24 PROCEDURE — 36415 COLL VENOUS BLD VENIPUNCTURE: CPT | Performed by: PHYSICIAN ASSISTANT

## 2019-10-24 PROCEDURE — 99213 OFFICE O/P EST LOW 20 MIN: CPT | Mod: 25 | Performed by: PHYSICIAN ASSISTANT

## 2019-10-24 PROCEDURE — 80048 BASIC METABOLIC PNL TOTAL CA: CPT | Performed by: PHYSICIAN ASSISTANT

## 2019-10-24 PROCEDURE — 99396 PREV VISIT EST AGE 40-64: CPT | Performed by: PHYSICIAN ASSISTANT

## 2019-10-24 PROCEDURE — 80061 LIPID PANEL: CPT | Performed by: PHYSICIAN ASSISTANT

## 2019-10-24 RX ORDER — GABAPENTIN 300 MG/1
CAPSULE ORAL
Qty: 270 CAPSULE | Refills: 11 | Status: SHIPPED | OUTPATIENT
Start: 2019-10-24 | End: 2020-10-25

## 2019-10-24 RX ORDER — AMITRIPTYLINE HYDROCHLORIDE 50 MG/1
TABLET ORAL
Qty: 90 TABLET | Refills: 3 | Status: SHIPPED | OUTPATIENT
Start: 2019-10-24 | End: 2020-10-25

## 2019-10-24 RX ORDER — FLUTICASONE PROPIONATE 50 MCG
2 SPRAY, SUSPENSION (ML) NASAL DAILY
Qty: 15.8 ML | Refills: 2 | Status: SHIPPED | OUTPATIENT
Start: 2019-10-24 | End: 2024-06-05

## 2019-10-24 RX ORDER — DULOXETIN HYDROCHLORIDE 30 MG/1
30 CAPSULE, DELAYED RELEASE ORAL 2 TIMES DAILY
Qty: 180 CAPSULE | Refills: 3 | Status: SHIPPED | OUTPATIENT
Start: 2019-10-24 | End: 2020-10-31

## 2019-10-24 ASSESSMENT — ENCOUNTER SYMPTOMS
HEMATURIA: 0
NERVOUS/ANXIOUS: 1
PARESTHESIAS: 0
ABDOMINAL PAIN: 0
DIARRHEA: 0
CONSTIPATION: 0
DIZZINESS: 0
FREQUENCY: 0
WEAKNESS: 0
NAUSEA: 0
ARTHRALGIAS: 0
CHILLS: 0
HEARTBURN: 0
FEVER: 0
SORE THROAT: 0
DYSURIA: 0
JOINT SWELLING: 0
EYE PAIN: 0
PALPITATIONS: 0
HEADACHES: 0
SHORTNESS OF BREATH: 0
COUGH: 1
HEMATOCHEZIA: 0
MYALGIAS: 0

## 2019-10-24 ASSESSMENT — ANXIETY QUESTIONNAIRES
3. WORRYING TOO MUCH ABOUT DIFFERENT THINGS: SEVERAL DAYS
4. TROUBLE RELAXING: NOT AT ALL
GAD7 TOTAL SCORE: 5
1. FEELING NERVOUS, ANXIOUS, OR ON EDGE: SEVERAL DAYS
7. FEELING AFRAID AS IF SOMETHING AWFUL MIGHT HAPPEN: SEVERAL DAYS
7. FEELING AFRAID AS IF SOMETHING AWFUL MIGHT HAPPEN: SEVERAL DAYS
2. NOT BEING ABLE TO STOP OR CONTROL WORRYING: SEVERAL DAYS
6. BECOMING EASILY ANNOYED OR IRRITABLE: SEVERAL DAYS
GAD7 TOTAL SCORE: 5
5. BEING SO RESTLESS THAT IT IS HARD TO SIT STILL: NOT AT ALL
GAD7 TOTAL SCORE: 5

## 2019-10-24 ASSESSMENT — PATIENT HEALTH QUESTIONNAIRE - PHQ9
SUM OF ALL RESPONSES TO PHQ QUESTIONS 1-9: 7
SUM OF ALL RESPONSES TO PHQ QUESTIONS 1-9: 7
10. IF YOU CHECKED OFF ANY PROBLEMS, HOW DIFFICULT HAVE THESE PROBLEMS MADE IT FOR YOU TO DO YOUR WORK, TAKE CARE OF THINGS AT HOME, OR GET ALONG WITH OTHER PEOPLE: SOMEWHAT DIFFICULT

## 2019-10-24 ASSESSMENT — MIFFLIN-ST. JEOR: SCORE: 2135.45

## 2019-10-24 NOTE — PATIENT INSTRUCTIONS
Preventive Health Recommendations  Male Ages 40 to 49    Yearly exam:             See your health care provider every year in order to  o   Review health changes.   o   Discuss preventive care.    o   Review your medicines if your doctor has prescribed any.    You should be tested each year for STDs (sexually transmitted diseases) if you re at risk.     Have a cholesterol test every 5 years.     Have a colonoscopy (test for colon cancer) if someone in your family has had colon cancer or polyps before age 50.     After age 45, have a diabetes test (fasting glucose). If you are at risk for diabetes, you should have this test every 3 years.      Talk with your health care provider about whether or not a prostate cancer screening test (PSA) is right for you.    Shots: Get a flu shot each year. Get a tetanus shot every 10 years.     Nutrition:    Eat at least 5 servings of fruits and vegetables daily.     Eat whole-grain bread, whole-wheat pasta and brown rice instead of white grains and rice.     Get adequate Calcium and Vitamin D.     Lifestyle    Exercise for at least 150 minutes a week (30 minutes a day, 5 days a week). This will help you control your weight and prevent disease.     Limit alcohol to one drink per day.     No smoking.     Wear sunscreen to prevent skin cancer.     See your dentist every six months for an exam and cleaning.      Will add Flonase to help with the allergies. I also recommend a daily antihistamine.    Follow up annually.

## 2019-10-24 NOTE — PROGRESS NOTES
SUBJECTIVE:   CC: Antonio Ford is an 49 year old male who presents for preventative health visit.     Healthy Habits:     Getting at least 3 servings of Calcium per day:  Yes    Bi-annual eye exam:  Yes    Dental care twice a year:  Yes    Sleep apnea or symptoms of sleep apnea:  None    Diet:  Regular (no restrictions)    Frequency of exercise:  1 day/week    Duration of exercise:  Less than 15 minutes    Taking medications regularly:  Yes    Medication side effects:  None    PHQ-2 Total Score: 2    Additional concerns today:  No      He has noticed nasal congestion, postnasal drainage, chest congestion and cough over the past few months. He wonders if this is allergies as he has a history of seasonal allergies but it typically never involves his chest. He has tried Mucinex DM which has helped. He noticed intermittent wheezing and mild shortness of breath when lying down but not frequently. He denies fevers, chills or chest pain.     His chronic penile/perineal pain is stable with gabapentin and amitriptyline.     Today's PHQ-2 Score:   PHQ-2 ( 1999 [employer]) 10/24/2019   Q1: Little interest or pleasure in doing things 1   Q2: Feeling down, depressed or hopeless 1   PHQ-2 Score 2   Q1: Little interest or pleasure in doing things Several days   Q2: Feeling down, depressed or hopeless Several days   PHQ-2 Score 2     Answers for HPI/ROS submitted by the patient on 10/24/2019   Annual Exam:  If you checked off any problems, how difficult have these problems made it for you to do your work, take care of things at home, or get along with other people?: Somewhat difficult  PHQ9 TOTAL SCORE: 7  FRANCISCO JAVIER 7 TOTAL SCORE: 5    Abuse: Current or Past(Physical, Sexual or Emotional)- No  Do you feel safe in your environment? Yes    Social History     Tobacco Use     Smoking status: Never Smoker     Smokeless tobacco: Never Used   Substance Use Topics     Alcohol use: Yes     Comment: occasionally      If you drink alcohol do you  "typically have >3 drinks per day or >7 drinks per week? No    Alcohol Use 10/24/2019   Prescreen: >3 drinks/day or >7 drinks/week? No   Prescreen: >3 drinks/day or >7 drinks/week? -       Last PSA:   PSA   Date Value Ref Range Status   12/27/2012 0.88 0 - 4 ug/L Final       Reviewed orders with patient. Reviewed health maintenance and updated orders accordingly - Yes    Reviewed and updated as needed this visit by clinical staff  Tobacco  Allergies  Meds  Med Hx  Surg Hx  Fam Hx  Soc Hx        Reviewed and updated as needed this visit by Provider  Tobacco  Allergies  Meds  Problems  Med Hx  Surg Hx  Fam Hx        Review of Systems   Constitutional: Negative for chills and fever.   HENT: Positive for congestion. Negative for ear pain, hearing loss and sore throat.    Eyes: Negative for pain and visual disturbance.   Respiratory: Positive for cough. Negative for shortness of breath.    Cardiovascular: Negative for chest pain, palpitations and peripheral edema.   Gastrointestinal: Negative for abdominal pain, constipation, diarrhea, heartburn, hematochezia and nausea.   Genitourinary: Negative for discharge, dysuria, frequency, genital sores, hematuria, impotence and urgency.   Musculoskeletal: Negative for arthralgias, joint swelling and myalgias.   Skin: Negative for rash.   Neurological: Negative for dizziness, weakness, headaches and paresthesias.   Psychiatric/Behavioral: Negative for mood changes. The patient is nervous/anxious.      OBJECTIVE:   /86   Pulse 85   Temp 98  F (36.7  C) (Temporal)   Resp 16   Ht 1.803 m (5' 11\")   Wt 123.8 kg (273 lb)   SpO2 97%   BMI 38.08 kg/m      Physical Exam  GENERAL: healthy, alert and no distress  EYES: Eyes grossly normal to inspection, PERRL and conjunctivae and sclerae normal  HENT: ear canals and TM's normal, nose and mouth without ulcers or lesions  NECK: no adenopathy, no asymmetry, masses, or scars and thyroid normal to palpation  RESP: lungs " clear to auscultation - no rales, rhonchi or wheezes  CV: regular rate and rhythm, normal S1 S2, no S3 or S4, no murmur, click or rub, no peripheral edema and peripheral pulses strong  ABDOMEN: soft, nontender, no hepatosplenomegaly, no masses and bowel sounds normal   (male): normal male circumcised genitalia without lesions or urethral discharge, no hernia  MS: no gross musculoskeletal defects noted, no edema. FROM to all extremities. No spinal tenderness.   SKIN: no suspicious lesions or rashes  NEURO: Normal strength and tone, mentation intact and speech normal. Cranial nerves II-XII are grossly intact. DTRs are 2+/4 throughout and symmetric. Gait is stable.  PSYCH: mentation appears normal, affect normal/bright    ASSESSMENT/PLAN:       ICD-10-CM    1. Encounter for routine adult health examination without abnormal findings Z00.00    2. Seasonal allergic rhinitis, unspecified trigger J30.2 fluticasone (FLONASE) 50 MCG/ACT nasal spray     OFFICE/OUTPT VISIT,EST,LEVL III   3. Generalized anxiety disorder F41.1 DULoxetine (CYMBALTA) 30 MG capsule   4. Mild episode of recurrent major depressive disorder (H) F33.0 DULoxetine (CYMBALTA) 30 MG capsule   5. Social anxiety disorder F40.10 DULoxetine (CYMBALTA) 30 MG capsule   6. Chronic pelvic pain in male R10.2 gabapentin (NEURONTIN) 300 MG capsule    G89.29 amitriptyline (ELAVIL) 50 MG tablet   7. Hyperlipidemia LDL goal <160 E78.5 Lipid panel reflex to direct LDL Fasting     OFFICE/OUTPT VISIT,EST,LEVL III   8. Class 2 obesity due to excess calories without serious comorbidity with body mass index (BMI) of 38.0 to 38.9 in adult E66.09 Basic metabolic panel  (Ca, Cl, CO2, Creat, Gluc, K, Na, BUN)    Z68.38 OFFICE/OUTPT VISIT,EST,LEVL III       2. His postnasal drainage and chest congestion are consistent with seasonal allergies. I recommend he restart a daily antihistamine and will also prescribe Flonase nasal spray to use as directed. If symptoms are not improving,  "he will contact the clinic.    3-5. Mood has been stable on Cymbalta so will continue current dose.    6. Stable with gabapentin and amitriptyline so will continue current doses.    7-8. His cholesterol has been quite high for the past few years but has declined trying a statin. I discussed getting back into a routine exercise regimen and he states he recently started going to the gym again with his son. I also recommend he cut back on the sweets and carb loaded foods. If cholesterol remains elevated, with LDL above 160, I recommend he reconsider trying a statin and he seems more open to this option this time.     Follow up annually.      COUNSELING:   Reviewed preventive health counseling, as reflected in patient instructions       Regular exercise       Healthy diet/nutrition    Estimated body mass index is 38.08 kg/m  as calculated from the following:    Height as of this encounter: 1.803 m (5' 11\").    Weight as of this encounter: 123.8 kg (273 lb).     Weight management plan: Discussed healthy diet and exercise guidelines     reports that he has never smoked. He has never used smokeless tobacco.      Counseling Resources:  ATP IV Guidelines  Pooled Cohorts Equation Calculator  FRAX Risk Assessment  ICSI Preventive Guidelines  Dietary Guidelines for Americans, 2010  USDA's MyPlate  ASA Prophylaxis  Lung CA Screening    Josh Morales PA-C  [facility]  "

## 2019-10-25 ASSESSMENT — ANXIETY QUESTIONNAIRES: GAD7 TOTAL SCORE: 5

## 2019-10-25 ASSESSMENT — PATIENT HEALTH QUESTIONNAIRE - PHQ9: SUM OF ALL RESPONSES TO PHQ QUESTIONS 1-9: 7

## 2019-10-29 ENCOUNTER — OFFICE VISIT (OUTPATIENT)
Dept: URGENT CARE | Facility: RETAIL CLINIC | Age: 49
End: 2019-10-29
Payer: COMMERCIAL

## 2019-10-29 VITALS
DIASTOLIC BLOOD PRESSURE: 89 MMHG | SYSTOLIC BLOOD PRESSURE: 130 MMHG | TEMPERATURE: 97.7 F | OXYGEN SATURATION: 97 % | HEART RATE: 70 BPM

## 2019-10-29 DIAGNOSIS — R06.2 WHEEZING: ICD-10-CM

## 2019-10-29 DIAGNOSIS — J06.9 VIRAL URI WITH COUGH: Primary | ICD-10-CM

## 2019-10-29 PROCEDURE — 99213 OFFICE O/P EST LOW 20 MIN: CPT | Performed by: PHYSICIAN ASSISTANT

## 2019-10-29 RX ORDER — PREDNISONE 20 MG/1
40 TABLET ORAL DAILY
Qty: 10 TABLET | Refills: 0 | Status: SHIPPED | OUTPATIENT
Start: 2019-10-29 | End: 2019-11-04

## 2019-10-29 RX ORDER — ALBUTEROL SULFATE 90 UG/1
2 AEROSOL, METERED RESPIRATORY (INHALATION) EVERY 4 HOURS PRN
Qty: 1 INHALER | Refills: 0 | Status: SHIPPED | OUTPATIENT
Start: 2019-10-29 | End: 2020-11-25

## 2019-10-29 ASSESSMENT — ENCOUNTER SYMPTOMS
STRIDOR: 0
CHEST TIGHTNESS: 1
SINUS PRESSURE: 1
RHINORRHEA: 0
HEADACHES: 0
SHORTNESS OF BREATH: 1
WHEEZING: 0
FATIGUE: 0
FEVER: 0
ADENOPATHY: 0
SINUS PAIN: 0
SORE THROAT: 0
COUGH: 1

## 2019-10-30 NOTE — PROGRESS NOTES
Chief Complaint   Patient presents with     Cough     since last friday, productive cough, pt thinks feverish during this time, nasal congestion/drainage since friday     SUBJECTIVE:  Antonio Ford is a 49 year old male who presents to the clinic today with a chief complaint of cough  for 5 days.  His cough is described as persistent and feeling like its coming from a tightness in his chest.  The patient's symptoms are moderate and persistent.  Associated symptoms include nasal congestion, post nasal drip and subjective fever.  The patient's symptoms are exacerbated by lying down.  Patient has been using Mucinex and NyQuil to improve symptoms.  Predisposing factors include: seasonal allergies.    Past Medical History:   Diagnosis Date     Allergic rhinitis, cause unspecified     Rhinitis, Allergic     Depressive disorder      Unspecified sinusitis (chronic)     Chronic sinusitis     amitriptyline (ELAVIL) 50 MG tablet, TAKE ONE TABLET BY MOUTH AT BEDTIME  cetirizine (ZYRTEC) 10 MG tablet, Take 10 mg by mouth daily  DULoxetine (CYMBALTA) 30 MG capsule, Take 1 capsule (30 mg) by mouth 2 times daily  gabapentin (NEURONTIN) 300 MG capsule, TAKE THREE CAPSULES BY MOUTH THREE TIMES DAILY  fluticasone (FLONASE) 50 MCG/ACT nasal spray, Spray 2 sprays into both nostrils daily (Patient not taking: Reported on 10/29/2019)    No current facility-administered medications on file prior to visit.     Social History     Tobacco Use     Smoking status: Never Smoker     Smokeless tobacco: Never Used   Substance Use Topics     Alcohol use: Yes     Comment: occasionally      Allergies   Allergen Reactions     Birch Trees      Cats      Dogs      Nuts      Seasonal Allergies      Review of Systems   Constitutional: Negative for fatigue and fever.   HENT: Positive for congestion and sinus pressure. Negative for ear pain, rhinorrhea, sinus pain and sore throat.    Respiratory: Positive for cough, chest tightness (and heaviness) and  shortness of breath. Negative for wheezing and stridor.    Neurological: Negative for headaches.   Hematological: Negative for adenopathy.     OBJECTIVE:  /89   Pulse 70   Temp 97.7  F (36.5  C) (Oral)   SpO2 97%   GENERAL APPEARANCE: healthy, alert and in no distress  HEENT: PERRL, conjunctiva clear. Bilateral ear canals and TM's normal. Nose without erythematous or edematous turbinates. Posterior pharynx nonerythematous and without tonsillar hypertrophy or exudate.  NECK: supple, nontender, no lymphadenopathy  RESP: lungs with end expiratory wheezes to auscultation - no rales, rhonchi. Breathing is comfortable, not labored and without use of accessory muscles.  CV: regular rates and rhythm, normal S1 S2, no murmur noted    ASSESSMENT:    ICD-10-CM    1. Viral URI with cough J06.9 albuterol (PROAIR HFA/PROVENTIL HFA/VENTOLIN HFA) 108 (90 Base) MCG/ACT inhaler    B97.89    2. Wheezing R06.2 predniSONE (DELTASONE) 20 MG tablet     PLAN:   Patient Instructions   Prednisone 40mg daily for 5 days.  Albuterol 2 puffs every 4 hours as needed for wheezing  Rest! Your body needs more rest to heal.  Drink plenty of fluids (warm fluids like tea or soup are soothing and reduce cough)  Sit in the bathroom with a hot shower running and breathe in the steam.  Honey may soothe your sore throat and help manage your cough- may take straight or in warm water with lemon juice.  Avoid smoke (cigarettes, bonfires, fireplace, wood burning stoves).  Take Tylenol or an NSAID such as ibuprofen or naproxen as needed for pain.  Delsym (dextromethorphan polistirex) is an over the counter cough medication that lasts 12 hours.   Mucinex or Robitussin (guiafenesin) thin mucus and may help it to loosen more quickly  Good handwashing is the best way to prevent spread of germs  Present to emergency room if you develop trouble breathing, swallowing or cough-up blood.  Follow up with your primary care provider if symptoms worsen or fail to  improve as expected.    Follow up with primary care provider with any problems, questions or concerns or if symptoms worsen or fail to improve. Patient agreed to plan and verbalized understanding.    Lachelle Madden PA-C  [facility]

## 2019-10-30 NOTE — PATIENT INSTRUCTIONS
Prednisone 40mg daily for 5 days.  Albuterol 2 puffs every 4 hours as needed for wheezing  Rest! Your body needs more rest to heal.  Drink plenty of fluids (warm fluids like tea or soup are soothing and reduce cough)  Sit in the bathroom with a hot shower running and breathe in the steam.  Honey may soothe your sore throat and help manage your cough- may take straight or in warm water with lemon juice.  Avoid smoke (cigarettes, bonfires, fireplace, wood burning stoves).  Take Tylenol or an NSAID such as ibuprofen or naproxen as needed for pain.  Delsym (dextromethorphan polistirex) is an over the counter cough medication that lasts 12 hours.   Mucinex or Robitussin (guiafenesin) thin mucus and may help it to loosen more quickly  Good handwashing is the best way to prevent spread of germs  Present to emergency room if you develop trouble breathing, swallowing or cough-up blood.  Follow up with your primary care provider if symptoms worsen or fail to improve as expected.

## 2019-11-04 ENCOUNTER — OFFICE VISIT (OUTPATIENT)
Dept: FAMILY MEDICINE | Facility: OTHER | Age: 49
End: 2019-11-04
Payer: COMMERCIAL

## 2019-11-04 ENCOUNTER — TELEPHONE (OUTPATIENT)
Dept: NURSING | Facility: CLINIC | Age: 49
End: 2019-11-04

## 2019-11-04 ENCOUNTER — HEALTH MAINTENANCE LETTER (OUTPATIENT)
Age: 49
End: 2019-11-04

## 2019-11-04 ENCOUNTER — ANCILLARY PROCEDURE (OUTPATIENT)
Dept: GENERAL RADIOLOGY | Facility: OTHER | Age: 49
End: 2019-11-04
Attending: PHYSICIAN ASSISTANT
Payer: COMMERCIAL

## 2019-11-04 VITALS
HEIGHT: 71 IN | WEIGHT: 282 LBS | OXYGEN SATURATION: 97 % | HEART RATE: 83 BPM | BODY MASS INDEX: 39.48 KG/M2 | RESPIRATION RATE: 18 BRPM | DIASTOLIC BLOOD PRESSURE: 86 MMHG | TEMPERATURE: 97.4 F | SYSTOLIC BLOOD PRESSURE: 130 MMHG

## 2019-11-04 DIAGNOSIS — J20.9 ACUTE BRONCHITIS WITH SYMPTOMS > 10 DAYS: ICD-10-CM

## 2019-11-04 PROCEDURE — 99213 OFFICE O/P EST LOW 20 MIN: CPT | Performed by: PHYSICIAN ASSISTANT

## 2019-11-04 PROCEDURE — 71046 X-RAY EXAM CHEST 2 VIEWS: CPT

## 2019-11-04 RX ORDER — ALBUTEROL SULFATE 90 UG/1
2 AEROSOL, METERED RESPIRATORY (INHALATION) EVERY 6 HOURS PRN
Qty: 1 INHALER | Refills: 0 | Status: SHIPPED | OUTPATIENT
Start: 2019-11-04 | End: 2020-11-25

## 2019-11-04 RX ORDER — DOXYCYCLINE 100 MG/1
100 CAPSULE ORAL 2 TIMES DAILY
Qty: 14 CAPSULE | Refills: 0 | Status: SHIPPED | OUTPATIENT
Start: 2019-11-04 | End: 2020-11-25

## 2019-11-04 ASSESSMENT — MIFFLIN-ST. JEOR: SCORE: 2176.27

## 2019-11-04 NOTE — PROGRESS NOTES
"Subjective     Antonio Ford is a 49 year old male who presents to clinic today for the following health issues:    HPI     Patient in clinic today to follow up on illness. He was seen for a physical on 10/24 and was told to try allergy medications for his ongoing symptoms of nasal congestion and a cough. He tried a daily antihistamine and Flonase without much benefit. His cough worsened so he was seen at [facility] on 10/29 and was started on Prednisone and an Albuterol inhaler. HePatient states that he felt slightly better when he was taking the prednisone, but now feels worse again. Patient his cough is not as productive as it was but he continues to cough frequently and feel chest pressure. He has some shortness of breath when lying down. Albuterol provided some relief. He also feels \"lethargic/kind of sick/drowsy. Not good.\" He denies fevers or chills. No leg swelling.     Reviewed and updated as needed this visit by Provider  Allergies  Meds  Problems  Med Hx     Review of Systems   GENERAL: Denies fever, fatigue, weakness, weight gain, or weight loss.  HEENT: Eyes-Denies pain, redness, loss of vision, double or blurred vision.     Ears/Nose- +Nasal congestion. Denies tinnitus, loss of hearing, epistaxis, decreased sense of smell. Denies loss of sense of taste, dry mouth, or sore throat.   CARDIOVASCULAR: +Shortness of breath at times. Denies chest pain, irregular heartbeats, palpitations, or edema.  RESPIRATORY: +Cough, shortness of breath, orthopnea. Denies hemoptysis.    Objective    /86   Pulse 83   Temp 97.4  F (36.3  C) (Temporal)   Resp 18   Ht 1.803 m (5' 11\")   Wt 127.9 kg (282 lb)   SpO2 97%   BMI 39.33 kg/m    Body mass index is 39.33 kg/m .  Physical Exam   GENERAL: healthy, alert and no distress  EYES: Eyes grossly normal to inspection, PERRL and conjunctivae and sclerae normal  HENT: ear canals and TM's normal. Nasal mucosa is erythematous and edematous bilaterally. "   NECK: no adenopathy, no asymmetry, masses, or scars and thyroid normal to palpation  RESP: lungs clear to auscultation - no rales, rhonchi or wheezes  CV: regular rate and rhythm, normal S1 S2, no S3 or S4, no murmur, click or rub, no peripheral edema      XR Chest    I personally reviewed the chest x-ray and there is a questionable haziness in the left lower lung. Will await final radiology report.     Assessment & Plan       ICD-10-CM    1. Acute bronchitis with symptoms > 10 days J20.9 XR Chest 2 Views     albuterol (PROAIR HFA/PROVENTIL HFA/VENTOLIN HFA) 108 (90 Base) MCG/ACT inhaler        Given his symptoms of cough and congestion over the past few months, worse over the past 2 weeks along with questionable haziness on chest x-ray. Will treat with doxycycline to cover for pneumonia.   Encouraged to take a probiotic while on this.  No cardiomegaly or leg edema to suggest a cardiac etiology.  I recommend plenty of fluids along with breathing in warm, moist air/humidifer.   Continue with Mucinex to help with congestion.    Use ibuprofen and/or Tylenol to help with fevers/pain.  Honey and tea can help with cough.   Will refill his albuterol inhaler to use 1-2 puffs every 6 hours as needed.  If symptoms are not improving within the next week, he should be seen again.     Josh Morales PA-C  [facility]

## 2019-11-05 ENCOUNTER — MYC MEDICAL ADVICE (OUTPATIENT)
Dept: FAMILY MEDICINE | Facility: OTHER | Age: 49
End: 2019-11-05

## 2019-11-05 DIAGNOSIS — J20.9 ACUTE BRONCHITIS WITH SYMPTOMS > 10 DAYS: Primary | ICD-10-CM

## 2019-11-05 RX ORDER — DOXYCYCLINE 100 MG/1
100 CAPSULE ORAL 2 TIMES DAILY
Qty: 6 CAPSULE | Refills: 0 | Status: SHIPPED | OUTPATIENT
Start: 2019-11-05 | End: 2020-11-25

## 2019-11-05 NOTE — TELEPHONE ENCOUNTER
JM, please review. It looks like Dr. Rizo had sent the Rx for him yesterday. For quantity 14 for twice daily. Patent wondering if this is supposed to be for 10 days?  Nora Elizabeth MA

## 2019-11-05 NOTE — TELEPHONE ENCOUNTER
"\"I was just seen in clinic and was told that my inhaler and an antibiotic would be sent over the [facility] pharmacy, they did not get the antibiotic.\" Per epic:/PA Given his symptoms of cough and congestion over the past few months, worse over the past 2 weeks along with questionable haziness on chest x-ray. Will treat with doxycycline to cover for pneumonia. Paged on call Dr. Rizo([facility]) through page op at 7:20 pm to call [facility] for RX order. MD will send order to pharmacy now. Patient was notified. Call back if needed.  Anju Tello RN [facility]      "

## 2019-11-05 NOTE — PATIENT INSTRUCTIONS
Will treat you with doxcycline to take as directed.   Take a probiotic or daily Activia yogurt while on this.  I recommend plenty of fluids along with breathing in warm, moist air/humidifer.   Continue with Mucinex to help with congestion.    Use ibuprofen and/or Tylenol to help with fevers/pain.  Honey and tea can help with cough.   Will prescribe an albuterol inhaler to use 1-2 puffs every 6 hours as needed.  If symptoms are not improving within the next week, you should be seen again.

## 2020-03-24 ENCOUNTER — E-VISIT (OUTPATIENT)
Dept: FAMILY MEDICINE | Facility: OTHER | Age: 50
End: 2020-03-24
Payer: COMMERCIAL

## 2020-03-24 DIAGNOSIS — J20.9 ACUTE BRONCHITIS WITH SYMPTOMS > 10 DAYS: Primary | ICD-10-CM

## 2020-03-24 PROCEDURE — 99421 OL DIG E/M SVC 5-10 MIN: CPT | Performed by: PHYSICIAN ASSISTANT

## 2020-03-25 RX ORDER — AZITHROMYCIN 250 MG/1
TABLET, FILM COATED ORAL
Qty: 6 TABLET | Refills: 0 | Status: SHIPPED | OUTPATIENT
Start: 2020-03-25 | End: 2020-03-30

## 2020-03-25 RX ORDER — CODEINE PHOSPHATE AND GUAIFENESIN 10; 100 MG/5ML; MG/5ML
1-2 SOLUTION ORAL
Qty: 120 ML | Refills: 0 | Status: SHIPPED | OUTPATIENT
Start: 2020-03-25 | End: 2020-11-25

## 2020-03-25 RX ORDER — BENZONATATE 100 MG/1
100 CAPSULE ORAL 3 TIMES DAILY PRN
Qty: 30 CAPSULE | Refills: 0 | Status: SHIPPED | OUTPATIENT
Start: 2020-03-25 | End: 2020-11-25

## 2020-10-24 DIAGNOSIS — R10.2 CHRONIC PELVIC PAIN IN MALE: ICD-10-CM

## 2020-10-24 DIAGNOSIS — G89.29 CHRONIC PELVIC PAIN IN MALE: ICD-10-CM

## 2020-10-25 RX ORDER — GABAPENTIN 300 MG/1
CAPSULE ORAL
Qty: 270 CAPSULE | Refills: 0 | Status: SHIPPED | OUTPATIENT
Start: 2020-10-25 | End: 2020-11-25

## 2020-10-25 RX ORDER — AMITRIPTYLINE HYDROCHLORIDE 50 MG/1
TABLET ORAL
Qty: 30 TABLET | Refills: 0 | Status: SHIPPED | OUTPATIENT
Start: 2020-10-25 | End: 2020-11-25

## 2020-10-25 NOTE — TELEPHONE ENCOUNTER
Pending Prescriptions:                       Disp   Refills    amitriptyline (ELAVIL) 50 MG tablet [Pharm*90 tab*0        Sig: TAKE ONE TABLET BY MOUTH AT BEDTIME    gabapentin (NEURONTIN) 300 MG capsule [Pha*270 ca*0        Sig: TAKE THREE CAPSULES BY MOUTH THREE TIMES DAILY      Routing refill request to provider for review/approval because:  Drug not on the [facility] refill protocol   Drug interaction warning    Payton Torres RN

## 2020-10-25 NOTE — TELEPHONE ENCOUNTER
Short refills sent. Needs to schedule annual physical prior to further refills.    Josh Morales PA-C

## 2020-10-31 DIAGNOSIS — F40.10 SOCIAL ANXIETY DISORDER: ICD-10-CM

## 2020-10-31 DIAGNOSIS — F33.0 MILD EPISODE OF RECURRENT MAJOR DEPRESSIVE DISORDER (H): ICD-10-CM

## 2020-10-31 DIAGNOSIS — F41.1 GENERALIZED ANXIETY DISORDER: ICD-10-CM

## 2020-10-31 RX ORDER — DULOXETIN HYDROCHLORIDE 30 MG/1
30 CAPSULE, DELAYED RELEASE ORAL 2 TIMES DAILY
Qty: 180 CAPSULE | Refills: 0 | Status: SHIPPED | OUTPATIENT
Start: 2020-10-31 | End: 2020-11-25

## 2020-10-31 NOTE — TELEPHONE ENCOUNTER
Pending Prescriptions:                       Disp   Refills    DULoxetine (CYMBALTA) 30 MG capsule [Phar*180 ca*0            Sig: Take 1 capsule (30 mg) by mouth 2 times daily    Medication is being filled for 1 time everett refill only due to:  Patient is due for mood check.     Please call and help schedule.  Thank you!  Vy Torres RN  October 31, 2020

## 2020-11-20 NOTE — PROGRESS NOTES
SUBJECTIVE:   CC: Antonio Ford is an 50 year old male who presents for preventative health visit.       Patient has been advised of split billing requirements and indicates understanding: Yes  Healthy Habits:     Getting at least 3 servings of Calcium per day:  Yes    Bi-annual eye exam:  Yes    Dental care twice a year:  Yes    Sleep apnea or symptoms of sleep apnea:  Excessive snoring    Diet:  Regular (no restrictions)    Frequency of exercise:  None    Taking medications regularly:  Yes    Medication side effects:  None and Other    PHQ-2 Total Score: 2    Additional concerns today:  Yes    Antonio has noticed left lower back pain, rectal bleeding, and erectile dysfunction over the past few months. The back pain feels somewhat achy sometimes radiating down his left leg. Mild numbness and tingling in the left leg is noted at times as well. Denies loss bladder and bowel control. He states that the pain limits the movements/activities he can do. He takes Tylenol/ibuprofen for the pain. No previous episodes or inciting injury.     The rectal bleeding appears bright red on toilet paper and in the toilet water. He has not noticed if the blood is within the stool or just surrounding it on the surface. He has a history of constipation and strains with bowel movements. Sometimes he feels a tearing/burning sensation while defecating. Denies dark colored blood in stools, diarrhea, nausea, vomiting, and abdominal pain. He questions if this is a result of hemorrhoids. No personal or family history of colon cancer.     Antonio recently got  and has experienced problems with both achieving and maintaining an erection. He is unsure why this is happening and would like to do something about this. He has noted weight gain since the gyms have closed due to COVID-19. Depression and anxiety have slightly worsened with the loss of his job and COVID-19, but he feels that it is stable overall.     Answers for HPI/ROS submitted by the  patient on 11/24/2020   Annual Exam:  If you checked off any problems, how difficult have these problems made it for you to do your work, take care of things at home, or get along with other people?: Somewhat difficult  PHQ9 TOTAL SCORE: 9  FRANCISCO JAVIER 7 TOTAL SCORE: 9    Today's PHQ-2 Score:   PHQ-2 ( 1999 Pfizer) 11/24/2020   Q1: Little interest or pleasure in doing things 1   Q2: Feeling down, depressed or hopeless 1   PHQ-2 Score 2   Q1: Little interest or pleasure in doing things Several days   Q2: Feeling down, depressed or hopeless Several days   PHQ-2 Score 2     Abuse: Current or Past(Physical, Sexual or Emotional)- No  Do you feel safe in your environment? Yes    Social History     Tobacco Use     Smoking status: Never Smoker     Smokeless tobacco: Never Used   Substance Use Topics     Alcohol use: Yes     Comment: occasionally      If you drink alcohol do you typically have >3 drinks per day or >7 drinks per week? No    AUDIT - Alcohol Use Disorders Identification Test - Reproduced from the World Health Organization Audit 2001 (Second Edition) 11/24/2020   1.  How often do you have a drink containing alcohol? 2 to 3 times a week   2.  How many drinks containing alcohol do you have on a typical day when you are drinking? 3 or 4   3.  How often do you have five or more drinks on one occasion? Monthly   4.  How often during the last year have you found that you were not able to stop drinking once you had started? Never   5.  How often during the last year have you failed to do what was normally expected of you because of drinking? Never   6.  How often during the last year have you needed a first drink in the morning to get yourself going after a heavy drinking session? Never   7.  How often during the last year have you had a feeling of guilt or remorse after drinking? Never   8.  How often during the last year have you been unable to remember what happened the night before because of your drinking? Never   9.   "Have you or someone else been injured because of your drinking? No   10. Has a relative, friend, doctor or other health care worker been concerned about your drinking or suggested you cut down? No   TOTAL SCORE 6     Last PSA:   PSA   Date Value Ref Range Status   12/27/2012 0.88 0 - 4 ug/L Final     Reviewed orders with patient. Reviewed health maintenance and updated orders accordingly - Yes    Reviewed and updated as needed this visit by clinical staff  Tobacco  Allergies  Meds   Med Hx  Surg Hx  Fam Hx  Soc Hx      Reviewed and updated as needed this visit by Provider  Tobacco  Allergies  Meds  Problems  Med Hx  Surg Hx  Fam Hx      Review of Systems   Constitutional: Negative for chills and fever.   HENT: Positive for congestion. Negative for ear pain, hearing loss and sore throat.    Eyes: Negative for pain and visual disturbance.   Respiratory: Negative for cough and shortness of breath.    Cardiovascular: Negative for chest pain, palpitations and peripheral edema.   Gastrointestinal: Positive for constipation and hematochezia. Negative for abdominal pain, diarrhea, heartburn and nausea.   Genitourinary: Positive for impotence. Negative for discharge, dysuria, frequency, genital sores, hematuria and urgency.   Musculoskeletal: Positive for myalgias. Negative for arthralgias and joint swelling.   Skin: Negative for rash.   Neurological: Negative for dizziness, weakness, headaches and paresthesias.   Psychiatric/Behavioral: Negative for mood changes. The patient is nervous/anxious.    All other systems reviewed and are negative.    OBJECTIVE:   /76 (BP Location: Right arm, Patient Position: Chair, Cuff Size: Adult Large)   Pulse 78   Temp 97.8  F (36.6  C) (Temporal)   Resp 16   Ht 1.803 m (5' 11\")   Wt 132.9 kg (293 lb)   SpO2 98%   BMI 40.87 kg/m      Physical Exam  GENERAL: healthy, alert and no distress  EYES: Eyes grossly normal to inspection, PERRL and conjunctivae and sclerae " normal  HENT: ear canals and TM's normal, nose and mouth without ulcers or lesions  NECK: no adenopathy, no asymmetry, masses, or scars   RESP: lungs clear to auscultation - no rales, rhonchi or wheezes  CV: regular rate and rhythm, normal S1 S2, no S3 or S4, no murmur, click or rub, no peripheral edema and peripheral pulses strong  ABDOMEN: soft, nontender, no hepatosplenomegaly, no masses and bowel sounds normal   (male): normal male circumcised genitalia without lesions or urethral discharge, no hernia  RECTAL: normal sphincter tone, no rectal masses, prostate normal size, smooth, nontender without nodules or masses. Small external hemorrhoid appreciated at the 12:00 position.   MS: no gross musculoskeletal defects noted, no edema  SKIN: no suspicious lesions or rashes  NEURO: Normal strength and tone, mentation intact and speech normal. Cranial nerves II-XII are grossly intact. DTRs are 2+/4 throughout and symmetric. Gait is stable.   PSYCH: mentation appears normal, affect normal/bright    Results for orders placed or performed in visit on 11/25/20   XR Lumbar Spine 2/3 Views     Status: None    Narrative    LUMBAR SPINE TWO-THREE  VIEWS  11/25/2020 2:41 PM     HISTORY: left low back pain, occasional pain into the leg; Acute  left-sided low back pain with left-sided sciatica    COMPARISON: None.    FINDINGS: There is normal osseous alignment.  No fractures are  identified.  The vertebral bodies and disc spaces are unremarkable.      Impression    IMPRESSION: No significant degenerative changes are identified.    MOR SOUTH MD       ASSESSMENT/PLAN:       ICD-10-CM    1. Routine general medical examination at a health care facility  Z00.00 INFLUENZA VACCINE IM > 6 MONTHS VALENT IIV4 [82071]   2. Chronic pelvic pain in male  R10.2 amitriptyline (ELAVIL) 50 MG tablet    G89.29 gabapentin (NEURONTIN) 300 MG capsule   3. Morbid obesity (H)  E66.01 Basic metabolic panel  (Ca, Cl, CO2, Creat, Gluc, K, Na, BUN)   4.  Generalized anxiety disorder  F41.1 DULoxetine (CYMBALTA) 30 MG capsule   5. Social anxiety disorder  F40.10 DULoxetine (CYMBALTA) 30 MG capsule   6. Mild episode of recurrent major depressive disorder (H)  F33.0 DULoxetine (CYMBALTA) 30 MG capsule   7. Acute left-sided low back pain with left-sided sciatica  M54.42 XR Lumbar Spine 2/3 Views   8. Hyperlipidemia LDL goal <160  E78.5 Lipid panel reflex to direct LDL Fasting   9. Grade I hemorrhoids  K64.0    10. Erectile dysfunction, unspecified erectile dysfunction type  N52.9 sildenafil (VIAGRA) 50 MG tablet   11. Screening for prostate cancer  Z12.5 PSA, screen   12. FH: prostate cancer  Z80.42 PSA, screen       1. Flu shot administered by MA.     2. Stable. No concerns about pelvic pain today as it has been controlled lately.     3. Recommend regular exercise and a healthy diet with small, frequent, low-carb meals.     4-6. Stable. Continue duloxetine and amitriptyline.     7. Symptoms consistent with sciatica. Recommend ice/heat and Tylenol/ibuprofen for pain. Will obtain a spine XR (results ended up coming back normal). Continue chiropractic care and contact the clinic if wanting the try physical therapy.     9. Rectal bleeding likely secondary to external hemorrhoids. One small external hemorrhoid at the 12 o'clock position on exam. Recommend adding a fiber supplement like metamucil to reduce constipation. A Sitz bath and/or moistened wipes may reduce the burning. Contact the clinic if bleeding worsens or does not improve.     10-11. Family history of prostate cancer. Will obtain PSA today.     12. Patient has difficulty achieving and maintaining erections. Will prescribe Viagra to be taken as needed prior to intercourse. He will contact the clinic if experiencing side effects.      Follow up annually.    Patient has been advised of split billing requirements and indicates understanding: Yes  COUNSELING:   Reviewed preventive health counseling, as reflected in  "patient instructions    Estimated body mass index is 40.87 kg/m  as calculated from the following:    Height as of this encounter: 1.803 m (5' 11\").    Weight as of this encounter: 132.9 kg (293 lb).     Weight management plan: Discussed healthy diet and exercise guidelines    He reports that he has never smoked. He has never used smokeless tobacco.      Counseling Resources:  ATP IV Guidelines  Pooled Cohorts Equation Calculator  FRAX Risk Assessment  ICSI Preventive Guidelines  Dietary Guidelines for Americans, 2010  USDA's MyPlate  ASA Prophylaxis  Lung CA Screening    Seen in conjunction with RIRI Cummings PA-C  [facility]  "

## 2020-11-20 NOTE — PATIENT INSTRUCTIONS
Preventive Health Recommendations  Male Ages 40 to 49    Yearly exam:             See your health care provider every year in order to  o   Review health changes.   o   Discuss preventive care.    o   Review your medicines if your doctor has prescribed any.    You should be tested each year for STDs (sexually transmitted diseases) if you re at risk.     Have a cholesterol test every 5 years.     Have a colonoscopy (test for colon cancer) if someone in your family has had colon cancer or polyps before age 50.     After age 45, have a diabetes test (fasting glucose). If you are at risk for diabetes, you should have this test every 3 years.      Talk with your health care provider about whether or not a prostate cancer screening test (PSA) is right for you.    Shots: Get a flu shot each year. Get a tetanus shot every 10 years.     Nutrition:    Eat at least 5 servings of fruits and vegetables daily.     Eat whole-grain bread, whole-wheat pasta and brown rice instead of white grains and rice.     Get adequate Calcium and Vitamin D.     Lifestyle    Exercise for at least 150 minutes a week (30 minutes a day, 5 days a week). This will help you control your weight and prevent disease.     Limit alcohol to one drink per day.     No smoking.     Wear sunscreen to prevent skin cancer.     See your dentist every six months for an exam and cleaning.      Will try Viagra 1 hour prior to intercourse as needed.  If you have side effects, let me know.    I recommend adding a fiber supplement like metamucil to help with the constipation.  Try a Sitz bath to help with the burning and use moistened wipes.  If bleeding worsening or not improving, let me know.    Will get an x-ray of your spine and I recommend ibuprofen or Tylenol as needed along with ice and heat.  Continue chiropractic and if you want to try physical therapy, let me know.    Follow up annually.

## 2020-11-22 ENCOUNTER — HEALTH MAINTENANCE LETTER (OUTPATIENT)
Age: 50
End: 2020-11-22

## 2020-11-24 ASSESSMENT — ENCOUNTER SYMPTOMS
WEAKNESS: 0
FREQUENCY: 0
HEMATOCHEZIA: 1
MYALGIAS: 1
EYE PAIN: 0
HEARTBURN: 0
HEADACHES: 0
ARTHRALGIAS: 0
COUGH: 0
JOINT SWELLING: 0
SHORTNESS OF BREATH: 0
FEVER: 0
PALPITATIONS: 0
ABDOMINAL PAIN: 0
CHILLS: 0
DYSURIA: 0
NAUSEA: 0
DIARRHEA: 0
NERVOUS/ANXIOUS: 1
PARESTHESIAS: 0
CONSTIPATION: 1
HEMATURIA: 0
DIZZINESS: 0
SORE THROAT: 0

## 2020-11-24 ASSESSMENT — ANXIETY QUESTIONNAIRES
GAD7 TOTAL SCORE: 9
7. FEELING AFRAID AS IF SOMETHING AWFUL MIGHT HAPPEN: SEVERAL DAYS
2. NOT BEING ABLE TO STOP OR CONTROL WORRYING: MORE THAN HALF THE DAYS
7. FEELING AFRAID AS IF SOMETHING AWFUL MIGHT HAPPEN: SEVERAL DAYS
5. BEING SO RESTLESS THAT IT IS HARD TO SIT STILL: NOT AT ALL
3. WORRYING TOO MUCH ABOUT DIFFERENT THINGS: MORE THAN HALF THE DAYS
1. FEELING NERVOUS, ANXIOUS, OR ON EDGE: MORE THAN HALF THE DAYS
4. TROUBLE RELAXING: SEVERAL DAYS
6. BECOMING EASILY ANNOYED OR IRRITABLE: SEVERAL DAYS

## 2020-11-24 ASSESSMENT — PATIENT HEALTH QUESTIONNAIRE - PHQ9
SUM OF ALL RESPONSES TO PHQ QUESTIONS 1-9: 9
SUM OF ALL RESPONSES TO PHQ QUESTIONS 1-9: 9
10. IF YOU CHECKED OFF ANY PROBLEMS, HOW DIFFICULT HAVE THESE PROBLEMS MADE IT FOR YOU TO DO YOUR WORK, TAKE CARE OF THINGS AT HOME, OR GET ALONG WITH OTHER PEOPLE: SOMEWHAT DIFFICULT

## 2020-11-25 ENCOUNTER — OFFICE VISIT (OUTPATIENT)
Dept: FAMILY MEDICINE | Facility: OTHER | Age: 50
End: 2020-11-25
Payer: COMMERCIAL

## 2020-11-25 ENCOUNTER — ANCILLARY PROCEDURE (OUTPATIENT)
Dept: GENERAL RADIOLOGY | Facility: OTHER | Age: 50
End: 2020-11-25
Attending: PHYSICIAN ASSISTANT
Payer: COMMERCIAL

## 2020-11-25 VITALS
TEMPERATURE: 97.8 F | DIASTOLIC BLOOD PRESSURE: 76 MMHG | RESPIRATION RATE: 16 BRPM | HEART RATE: 78 BPM | BODY MASS INDEX: 41.02 KG/M2 | HEIGHT: 71 IN | SYSTOLIC BLOOD PRESSURE: 134 MMHG | OXYGEN SATURATION: 98 % | WEIGHT: 293 LBS

## 2020-11-25 DIAGNOSIS — E78.5 HYPERLIPIDEMIA LDL GOAL <160: ICD-10-CM

## 2020-11-25 DIAGNOSIS — F41.1 GENERALIZED ANXIETY DISORDER: ICD-10-CM

## 2020-11-25 DIAGNOSIS — F33.0 MILD EPISODE OF RECURRENT MAJOR DEPRESSIVE DISORDER (H): ICD-10-CM

## 2020-11-25 DIAGNOSIS — G89.29 CHRONIC PELVIC PAIN IN MALE: ICD-10-CM

## 2020-11-25 DIAGNOSIS — N52.9 ERECTILE DYSFUNCTION, UNSPECIFIED ERECTILE DYSFUNCTION TYPE: ICD-10-CM

## 2020-11-25 DIAGNOSIS — R10.2 CHRONIC PELVIC PAIN IN MALE: ICD-10-CM

## 2020-11-25 DIAGNOSIS — E66.01 MORBID OBESITY (H): ICD-10-CM

## 2020-11-25 DIAGNOSIS — M54.42 ACUTE LEFT-SIDED LOW BACK PAIN WITH LEFT-SIDED SCIATICA: ICD-10-CM

## 2020-11-25 DIAGNOSIS — Z00.00 ROUTINE GENERAL MEDICAL EXAMINATION AT A HEALTH CARE FACILITY: Primary | ICD-10-CM

## 2020-11-25 DIAGNOSIS — Z12.5 SCREENING FOR PROSTATE CANCER: ICD-10-CM

## 2020-11-25 DIAGNOSIS — F40.10 SOCIAL ANXIETY DISORDER: ICD-10-CM

## 2020-11-25 DIAGNOSIS — K64.0 GRADE I HEMORRHOIDS: ICD-10-CM

## 2020-11-25 DIAGNOSIS — Z80.42 FH: PROSTATE CANCER: ICD-10-CM

## 2020-11-25 LAB
ANION GAP SERPL CALCULATED.3IONS-SCNC: 4 MMOL/L (ref 3–14)
BUN SERPL-MCNC: 20 MG/DL (ref 7–30)
CALCIUM SERPL-MCNC: 9.4 MG/DL (ref 8.5–10.1)
CHLORIDE SERPL-SCNC: 108 MMOL/L (ref 94–109)
CHOLEST SERPL-MCNC: 258 MG/DL
CO2 SERPL-SCNC: 28 MMOL/L (ref 20–32)
CREAT SERPL-MCNC: 1.09 MG/DL (ref 0.66–1.25)
GFR SERPL CREATININE-BSD FRML MDRD: 80 ML/MIN/{1.73_M2}
GLUCOSE SERPL-MCNC: 94 MG/DL (ref 70–99)
HDLC SERPL-MCNC: 33 MG/DL
LDLC SERPL CALC-MCNC: 176 MG/DL
NONHDLC SERPL-MCNC: 225 MG/DL
POTASSIUM SERPL-SCNC: 4.5 MMOL/L (ref 3.4–5.3)
PSA SERPL-ACNC: 0.77 UG/L (ref 0–4)
SODIUM SERPL-SCNC: 140 MMOL/L (ref 133–144)
TRIGL SERPL-MCNC: 244 MG/DL

## 2020-11-25 PROCEDURE — 80061 LIPID PANEL: CPT | Performed by: PHYSICIAN ASSISTANT

## 2020-11-25 PROCEDURE — 90471 IMMUNIZATION ADMIN: CPT | Performed by: PHYSICIAN ASSISTANT

## 2020-11-25 PROCEDURE — 99396 PREV VISIT EST AGE 40-64: CPT | Mod: 25 | Performed by: PHYSICIAN ASSISTANT

## 2020-11-25 PROCEDURE — 80048 BASIC METABOLIC PNL TOTAL CA: CPT | Performed by: PHYSICIAN ASSISTANT

## 2020-11-25 PROCEDURE — 90686 IIV4 VACC NO PRSV 0.5 ML IM: CPT | Performed by: PHYSICIAN ASSISTANT

## 2020-11-25 PROCEDURE — 99213 OFFICE O/P EST LOW 20 MIN: CPT | Mod: 25 | Performed by: PHYSICIAN ASSISTANT

## 2020-11-25 PROCEDURE — G0103 PSA SCREENING: HCPCS | Performed by: PHYSICIAN ASSISTANT

## 2020-11-25 PROCEDURE — 36415 COLL VENOUS BLD VENIPUNCTURE: CPT | Performed by: PHYSICIAN ASSISTANT

## 2020-11-25 PROCEDURE — 72100 X-RAY EXAM L-S SPINE 2/3 VWS: CPT | Performed by: RADIOLOGY

## 2020-11-25 RX ORDER — DULOXETIN HYDROCHLORIDE 30 MG/1
30 CAPSULE, DELAYED RELEASE ORAL 2 TIMES DAILY
Qty: 180 CAPSULE | Refills: 3 | Status: SHIPPED | OUTPATIENT
Start: 2020-11-25 | End: 2022-02-10

## 2020-11-25 RX ORDER — SILDENAFIL 50 MG/1
TABLET, FILM COATED ORAL
Qty: 6 TABLET | Refills: 5 | Status: SHIPPED | OUTPATIENT
Start: 2020-11-25 | End: 2021-03-18

## 2020-11-25 RX ORDER — AMITRIPTYLINE HYDROCHLORIDE 50 MG/1
50 TABLET ORAL AT BEDTIME
Qty: 90 TABLET | Refills: 3 | Status: SHIPPED | OUTPATIENT
Start: 2020-11-25 | End: 2021-11-29

## 2020-11-25 RX ORDER — GABAPENTIN 300 MG/1
CAPSULE ORAL
Qty: 270 CAPSULE | Refills: 5 | Status: SHIPPED | OUTPATIENT
Start: 2020-11-25 | End: 2021-08-23

## 2020-11-25 ASSESSMENT — ANXIETY QUESTIONNAIRES: GAD7 TOTAL SCORE: 9

## 2020-11-25 ASSESSMENT — ENCOUNTER SYMPTOMS
DYSURIA: 0
DIARRHEA: 0
MYALGIAS: 1
PALPITATIONS: 0
SHORTNESS OF BREATH: 0
DIZZINESS: 0
CONSTIPATION: 1
EYE PAIN: 0
NAUSEA: 0
HEMATOCHEZIA: 1
HEMATURIA: 0
NERVOUS/ANXIOUS: 1
FEVER: 0
ARTHRALGIAS: 0
ABDOMINAL PAIN: 0
FREQUENCY: 0
SORE THROAT: 0
HEARTBURN: 0
JOINT SWELLING: 0
HEADACHES: 0
WEAKNESS: 0
COUGH: 0
CHILLS: 0
PARESTHESIAS: 0

## 2020-11-25 ASSESSMENT — PAIN SCALES - GENERAL: PAINLEVEL: MODERATE PAIN (4)

## 2020-11-25 ASSESSMENT — PATIENT HEALTH QUESTIONNAIRE - PHQ9: SUM OF ALL RESPONSES TO PHQ QUESTIONS 1-9: 9

## 2020-11-25 ASSESSMENT — MIFFLIN-ST. JEOR: SCORE: 2221.17

## 2021-03-18 ENCOUNTER — MYC MEDICAL ADVICE (OUTPATIENT)
Dept: FAMILY MEDICINE | Facility: OTHER | Age: 51
End: 2021-03-18

## 2021-03-18 DIAGNOSIS — N52.9 ERECTILE DYSFUNCTION, UNSPECIFIED ERECTILE DYSFUNCTION TYPE: ICD-10-CM

## 2021-03-18 RX ORDER — SILDENAFIL 50 MG/1
TABLET, FILM COATED ORAL
Qty: 12 TABLET | Refills: 5 | Status: SHIPPED | OUTPATIENT
Start: 2021-03-18 | End: 2022-02-10

## 2021-03-18 NOTE — TELEPHONE ENCOUNTER
sildenafil (VIAGRA) 50 MG tablet 6 tablet 5 11/25/2020  --   Sig: Take 0.5-2 tablets 1-4 hours prior to intercourse   Sent to pharmacy as: Sildenafil Citrate 50 MG Oral Tablet (VIAGRA)     Are you able to change to 12 per month or would you like to do a virtual visit??? To discuss.....

## 2021-08-22 DIAGNOSIS — G89.29 CHRONIC PELVIC PAIN IN MALE: ICD-10-CM

## 2021-08-22 DIAGNOSIS — R10.2 CHRONIC PELVIC PAIN IN MALE: ICD-10-CM

## 2021-08-23 RX ORDER — GABAPENTIN 300 MG/1
CAPSULE ORAL
Qty: 270 CAPSULE | Refills: 0 | Status: SHIPPED | OUTPATIENT
Start: 2021-08-23 | End: 2021-11-01

## 2021-08-23 NOTE — TELEPHONE ENCOUNTER
Pending Prescriptions:                       Disp   Refills    gabapentin (NEURONTIN) 300 MG capsule [Pha*270 ca*0        Sig: TAKE THREE CAPSULES BY MOUTH THREE TIMES DAILY     Routing refill request to provider for review/approval because:  Drug not on the [facility] refill protocol

## 2021-08-23 NOTE — TELEPHONE ENCOUNTER
Reviewed note from November, symptoms stable at that time further refills by PCP  Sindhu Null PA-C

## 2021-09-18 ENCOUNTER — HEALTH MAINTENANCE LETTER (OUTPATIENT)
Age: 51
End: 2021-09-18

## 2021-10-31 DIAGNOSIS — R10.2 CHRONIC PELVIC PAIN IN MALE: ICD-10-CM

## 2021-10-31 DIAGNOSIS — G89.29 CHRONIC PELVIC PAIN IN MALE: ICD-10-CM

## 2021-11-01 ENCOUNTER — MYC REFILL (OUTPATIENT)
Dept: FAMILY MEDICINE | Facility: OTHER | Age: 51
End: 2021-11-01

## 2021-11-01 DIAGNOSIS — G89.29 CHRONIC PELVIC PAIN IN MALE: ICD-10-CM

## 2021-11-01 DIAGNOSIS — R10.2 CHRONIC PELVIC PAIN IN MALE: ICD-10-CM

## 2021-11-01 RX ORDER — GABAPENTIN 300 MG/1
CAPSULE ORAL
Qty: 270 CAPSULE | Refills: 0 | OUTPATIENT
Start: 2021-11-01

## 2021-11-01 RX ORDER — GABAPENTIN 300 MG/1
CAPSULE ORAL
Qty: 270 CAPSULE | Refills: 0 | Status: SHIPPED | OUTPATIENT
Start: 2021-11-01 | End: 2021-11-29

## 2021-11-01 NOTE — TELEPHONE ENCOUNTER
Pending Prescriptions:                       Disp   Refills    gabapentin (NEURONTIN) 300 MG capsule      270 ca*0        Sig: TAKE THREE CAPSULES BY MOUTH THREE TIMES DAILY    Routing refill request to provider for review/approval because:  Drug not on the [facility] refill protocol

## 2021-11-13 ENCOUNTER — HEALTH MAINTENANCE LETTER (OUTPATIENT)
Age: 51
End: 2021-11-13

## 2021-11-28 DIAGNOSIS — R10.2 CHRONIC PELVIC PAIN IN MALE: ICD-10-CM

## 2021-11-28 DIAGNOSIS — G89.29 CHRONIC PELVIC PAIN IN MALE: ICD-10-CM

## 2021-11-29 ENCOUNTER — MYC REFILL (OUTPATIENT)
Dept: FAMILY MEDICINE | Facility: OTHER | Age: 51
End: 2021-11-29
Payer: COMMERCIAL

## 2021-11-29 DIAGNOSIS — R10.2 CHRONIC PELVIC PAIN IN MALE: ICD-10-CM

## 2021-11-29 DIAGNOSIS — G89.29 CHRONIC PELVIC PAIN IN MALE: ICD-10-CM

## 2021-11-29 RX ORDER — AMITRIPTYLINE HYDROCHLORIDE 50 MG/1
TABLET ORAL
Qty: 90 TABLET | Refills: 0 | Status: SHIPPED | OUTPATIENT
Start: 2021-11-29 | End: 2022-02-10

## 2021-12-01 RX ORDER — AMITRIPTYLINE HYDROCHLORIDE 50 MG/1
50 TABLET ORAL
Qty: 90 TABLET | Refills: 0 | OUTPATIENT
Start: 2021-12-01

## 2022-01-08 ENCOUNTER — HEALTH MAINTENANCE LETTER (OUTPATIENT)
Age: 52
End: 2022-01-08

## 2022-01-21 DIAGNOSIS — R10.2 CHRONIC PELVIC PAIN IN MALE: ICD-10-CM

## 2022-01-21 DIAGNOSIS — G89.29 CHRONIC PELVIC PAIN IN MALE: ICD-10-CM

## 2022-01-21 RX ORDER — GABAPENTIN 300 MG/1
CAPSULE ORAL
Qty: 270 CAPSULE | Refills: 0 | Status: SHIPPED | OUTPATIENT
Start: 2022-01-21 | End: 2022-02-10

## 2022-02-10 ENCOUNTER — OFFICE VISIT (OUTPATIENT)
Dept: FAMILY MEDICINE | Facility: OTHER | Age: 52
End: 2022-02-10
Payer: COMMERCIAL

## 2022-02-10 VITALS
BODY MASS INDEX: 39.42 KG/M2 | RESPIRATION RATE: 18 BRPM | OXYGEN SATURATION: 98 % | TEMPERATURE: 96.9 F | HEART RATE: 91 BPM | HEIGHT: 72 IN | SYSTOLIC BLOOD PRESSURE: 128 MMHG | WEIGHT: 291 LBS | DIASTOLIC BLOOD PRESSURE: 88 MMHG

## 2022-02-10 DIAGNOSIS — F40.10 SOCIAL ANXIETY DISORDER: ICD-10-CM

## 2022-02-10 DIAGNOSIS — Z11.4 SCREENING FOR HIV (HUMAN IMMUNODEFICIENCY VIRUS): ICD-10-CM

## 2022-02-10 DIAGNOSIS — Z11.59 NEED FOR HEPATITIS C SCREENING TEST: ICD-10-CM

## 2022-02-10 DIAGNOSIS — G89.29 CHRONIC PELVIC PAIN IN MALE: ICD-10-CM

## 2022-02-10 DIAGNOSIS — K62.5 RECTAL BLEEDING: ICD-10-CM

## 2022-02-10 DIAGNOSIS — Z00.00 ENCOUNTER FOR ROUTINE ADULT HEALTH EXAMINATION WITHOUT ABNORMAL FINDINGS: Primary | ICD-10-CM

## 2022-02-10 DIAGNOSIS — R10.2 CHRONIC PELVIC PAIN IN MALE: ICD-10-CM

## 2022-02-10 DIAGNOSIS — F33.0 MILD EPISODE OF RECURRENT MAJOR DEPRESSIVE DISORDER (H): ICD-10-CM

## 2022-02-10 DIAGNOSIS — K64.0 GRADE I HEMORRHOIDS: ICD-10-CM

## 2022-02-10 DIAGNOSIS — N52.9 ERECTILE DYSFUNCTION, UNSPECIFIED ERECTILE DYSFUNCTION TYPE: ICD-10-CM

## 2022-02-10 DIAGNOSIS — Z12.11 SCREEN FOR COLON CANCER: ICD-10-CM

## 2022-02-10 DIAGNOSIS — E66.01 MORBID OBESITY (H): ICD-10-CM

## 2022-02-10 DIAGNOSIS — E78.5 HYPERLIPIDEMIA LDL GOAL <160: ICD-10-CM

## 2022-02-10 DIAGNOSIS — Z13.1 SCREENING FOR DIABETES MELLITUS: ICD-10-CM

## 2022-02-10 DIAGNOSIS — Z12.5 SCREENING FOR PROSTATE CANCER: ICD-10-CM

## 2022-02-10 DIAGNOSIS — Z80.42 FH: PROSTATE CANCER: ICD-10-CM

## 2022-02-10 DIAGNOSIS — F41.1 GENERALIZED ANXIETY DISORDER: ICD-10-CM

## 2022-02-10 LAB
ANION GAP SERPL CALCULATED.3IONS-SCNC: 6 MMOL/L (ref 3–14)
BUN SERPL-MCNC: 20 MG/DL (ref 7–30)
CALCIUM SERPL-MCNC: 9.5 MG/DL (ref 8.5–10.1)
CHLORIDE BLD-SCNC: 106 MMOL/L (ref 94–109)
CHOLEST SERPL-MCNC: 252 MG/DL
CO2 SERPL-SCNC: 26 MMOL/L (ref 20–32)
CREAT SERPL-MCNC: 0.98 MG/DL (ref 0.66–1.25)
FASTING STATUS PATIENT QL REPORTED: YES
GFR SERPL CREATININE-BSD FRML MDRD: >90 ML/MIN/1.73M2
GLUCOSE BLD-MCNC: 103 MG/DL (ref 70–99)
HCV AB SERPL QL IA: NONREACTIVE
HDLC SERPL-MCNC: 29 MG/DL
HIV 1+2 AB+HIV1 P24 AG SERPL QL IA: NONREACTIVE
LDLC SERPL CALC-MCNC: 168 MG/DL
NONHDLC SERPL-MCNC: 223 MG/DL
POTASSIUM BLD-SCNC: 4.3 MMOL/L (ref 3.4–5.3)
PSA SERPL-MCNC: 1.4 UG/L (ref 0–4)
SODIUM SERPL-SCNC: 138 MMOL/L (ref 133–144)
TRIGL SERPL-MCNC: 277 MG/DL

## 2022-02-10 PROCEDURE — 80061 LIPID PANEL: CPT | Performed by: PHYSICIAN ASSISTANT

## 2022-02-10 PROCEDURE — 99396 PREV VISIT EST AGE 40-64: CPT | Performed by: PHYSICIAN ASSISTANT

## 2022-02-10 PROCEDURE — 80048 BASIC METABOLIC PNL TOTAL CA: CPT | Performed by: PHYSICIAN ASSISTANT

## 2022-02-10 PROCEDURE — 99214 OFFICE O/P EST MOD 30 MIN: CPT | Mod: 25 | Performed by: PHYSICIAN ASSISTANT

## 2022-02-10 PROCEDURE — G0103 PSA SCREENING: HCPCS | Performed by: PHYSICIAN ASSISTANT

## 2022-02-10 PROCEDURE — 36415 COLL VENOUS BLD VENIPUNCTURE: CPT | Performed by: PHYSICIAN ASSISTANT

## 2022-02-10 PROCEDURE — 87389 HIV-1 AG W/HIV-1&-2 AB AG IA: CPT | Performed by: PHYSICIAN ASSISTANT

## 2022-02-10 PROCEDURE — 86803 HEPATITIS C AB TEST: CPT | Performed by: PHYSICIAN ASSISTANT

## 2022-02-10 RX ORDER — SILDENAFIL 50 MG/1
TABLET, FILM COATED ORAL
Qty: 20 TABLET | Refills: 5 | Status: SHIPPED | OUTPATIENT
Start: 2022-02-10 | End: 2023-02-13

## 2022-02-10 RX ORDER — AMITRIPTYLINE HYDROCHLORIDE 50 MG/1
TABLET ORAL
Qty: 90 TABLET | Refills: 3 | Status: SHIPPED | OUTPATIENT
Start: 2022-02-10 | End: 2023-03-01

## 2022-02-10 RX ORDER — GABAPENTIN 300 MG/1
900 CAPSULE ORAL 3 TIMES DAILY
Qty: 270 CAPSULE | Refills: 11 | Status: SHIPPED | OUTPATIENT
Start: 2022-02-10 | End: 2023-02-13

## 2022-02-10 RX ORDER — DULOXETIN HYDROCHLORIDE 30 MG/1
30 CAPSULE, DELAYED RELEASE ORAL 2 TIMES DAILY
Qty: 180 CAPSULE | Refills: 3 | Status: SHIPPED | OUTPATIENT
Start: 2022-02-10 | End: 2023-02-10

## 2022-02-10 ASSESSMENT — ENCOUNTER SYMPTOMS
CONSTIPATION: 1
FREQUENCY: 0
SORE THROAT: 0
FEVER: 0
PARESTHESIAS: 0
DIZZINESS: 0
COUGH: 0
ARTHRALGIAS: 0
DIARRHEA: 0
HEMATURIA: 0
SHORTNESS OF BREATH: 0
PALPITATIONS: 0
CHILLS: 0
NAUSEA: 0
HEMATOCHEZIA: 1
HEARTBURN: 0
EYE PAIN: 0
MYALGIAS: 1
WEAKNESS: 0
ABDOMINAL PAIN: 0
DYSURIA: 0
JOINT SWELLING: 0
HEADACHES: 0
NERVOUS/ANXIOUS: 1

## 2022-02-10 ASSESSMENT — MIFFLIN-ST. JEOR: SCORE: 2223.1

## 2022-02-10 NOTE — PROGRESS NOTES
SUBJECTIVE:   CC: Antonio Ford is an 51 year old male who presents for preventative health visit.       Patient has been advised of split billing requirements and indicates understanding: Yes     Healthy Habits:     Getting at least 3 servings of Calcium per day:  NO    Bi-annual eye exam:  Yes    Dental care twice a year:  Yes    Sleep apnea or symptoms of sleep apnea:  Daytime drowsiness    Diet:  Regular (no restrictions)    Frequency of exercise:  2-3 days/week    Duration of exercise:  15-30 minutes    Taking medications regularly:  Yes    Medication side effects:  None    PHQ-2 Total Score: 2    Additional concerns today:  No    He still has intermittent rectal bleeding, usually once or twice per month after a bowel movement. There is never any blood in the stool itself but it is always in the toilet water or on the toilet paper. He does have history of hemorrhoids but thinks he may have an anal fissure sometimes too. He is usually good about daily metamucil to keep his stools soft. No abdominal pain, nausea, vomiting or diarrhea.    He still has chronic anxiety and although it remains well controlled, he wishes it would just go away.    His chronic pelvic pain is stable on his current medication regimen.     Today's PHQ-2 Score:   PHQ-2 ( 1999 Pfizer) 2/10/2022   Q1: Little interest or pleasure in doing things 1   Q2: Feeling down, depressed or hopeless 1   PHQ-2 Score 2   PHQ-2 Total Score (12-17 Years)- Positive if 3 or more points; Administer PHQ-A if positive -   Q1: Little interest or pleasure in doing things Several days   Q2: Feeling down, depressed or hopeless Several days   PHQ-2 Score 2       Abuse: Current or Past(Physical, Sexual or Emotional)- No  Do you feel safe in your environment? Yes    Have you ever done Advance Care Planning? (For example, a Health Directive, POLST, or a discussion with a medical provider or your loved ones about your wishes): No, advance care planning information given  to patient to review.  Patient declined advance care planning discussion at this time.    Social History     Tobacco Use     Smoking status: Never Smoker     Smokeless tobacco: Never Used   Substance Use Topics     Alcohol use: Yes     Comment: occasionally      If you drink alcohol do you typically have >3 drinks per day or >7 drinks per week? No      AUDIT - Alcohol Use Disorders Identification Test - Reproduced from the World Health Organization Audit 2001 (Second Edition) 11/24/2020   1.  How often do you have a drink containing alcohol? 2 to 3 times a week   2.  How many drinks containing alcohol do you have on a typical day when you are drinking? 3 or 4   3.  How often do you have five or more drinks on one occasion? Monthly   4.  How often during the last year have you found that you were not able to stop drinking once you had started? Never   5.  How often during the last year have you failed to do what was normally expected of you because of drinking? Never   6.  How often during the last year have you needed a first drink in the morning to get yourself going after a heavy drinking session? Never   7.  How often during the last year have you had a feeling of guilt or remorse after drinking? Never   8.  How often during the last year have you been unable to remember what happened the night before because of your drinking? Never   9.  Have you or someone else been injured because of your drinking? No   10. Has a relative, friend, doctor or other health care worker been concerned about your drinking or suggested you cut down? No   TOTAL SCORE 6       Last PSA:   PSA   Date Value Ref Range Status   11/25/2020 0.77 0 - 4 ug/L Final     Comment:     Assay Method:  Chemiluminescence using Siemens Vista analyzer       Reviewed orders with patient. Reviewed health maintenance and updated orders accordingly - Yes    Reviewed and updated as needed this visit by clinical staff  Tobacco  Allergies  Meds  Problems   "Med Hx  Surg Hx  Fam Hx  Soc Hx         Reviewed and updated as needed this visit by Provider  Tobacco  Allergies  Meds  Problems  Med Hx  Surg Hx  Fam Hx            Review of Systems  CONSTITUTIONAL: NEGATIVE for fever, chills, change in weight  INTEGUMENTARY/SKIN: NEGATIVE for worrisome rashes, moles or lesions  EYES: NEGATIVE for vision changes or irritation  ENT: NEGATIVE for ear, mouth and throat problems  RESP: NEGATIVE for significant cough or SOB  CV: NEGATIVE for chest pain, palpitations or peripheral edema  GI: NEGATIVE for nausea, abdominal pain, heartburn, or change in bowel habits   male: negative for dysuria, hematuria, decreased urinary stream, erectile dysfunction, urethral discharge  MUSCULOSKELETAL: NEGATIVE for significant arthralgias or myalgia  NEURO: NEGATIVE for weakness, dizziness or paresthesias  ENDOCRINE: NEGATIVE for temperature intolerance, skin/hair changes  HEME/ALLERGY/IMMUNE: NEGATIVE for bleeding problems  PSYCHIATRIC: NEGATIVE for changes in mood or affect    OBJECTIVE:   /88   Pulse 91   Temp 96.9  F (36.1  C) (Temporal)   Resp 18   Ht 1.829 m (6' 0.01\")   Wt 132 kg (291 lb)   SpO2 98%   BMI 39.46 kg/m      Physical Exam  GENERAL: healthy, alert and no distress  EYES: Eyes grossly normal to inspection, PERRL and conjunctivae and sclerae normal  HENT: ear canals and TM's normal, nose and mouth without ulcers or lesions  NECK: no adenopathy, no asymmetry, masses, or scars and thyroid normal to palpation  RESP: lungs clear to auscultation - no rales, rhonchi or wheezes  CV: regular rate and rhythm, normal S1 S2, no S3 or S4, no murmur, click or rub, no peripheral edema and peripheral pulses strong  ABDOMEN: soft, nontender, no hepatosplenomegaly, no masses and bowel sounds normal   (male): normal male circumcised genitalia without lesions or urethral discharge, no hernia  RECTAL: normal sphincter tone, no rectal masses  MS: no gross musculoskeletal defects " noted, no edema. FROM to all extremities. No spinal tenderness.   SKIN: no suspicious lesions or rashes  NEURO: Normal strength and tone, mentation intact and speech normal. Cranial nerves II-XII are grossly intact. DTRs are 2+/4 throughout and symmetric. Gait is stable. .  PSYCH: mentation appears normal, affect normal/bright      ASSESSMENT/PLAN:       ICD-10-CM    1. Encounter for routine adult health examination without abnormal findings  Z00.00    2. Generalized anxiety disorder  F41.1 DULoxetine (CYMBALTA) 30 MG capsule   3. Erectile dysfunction, unspecified erectile dysfunction type  N52.9 sildenafil (VIAGRA) 50 MG tablet   4. Chronic pelvic pain in male  R10.2 gabapentin (NEURONTIN) 300 MG capsule    G89.29 amitriptyline (ELAVIL) 50 MG tablet   5. Rectal bleeding  K62.5    6. Morbid obesity (H)  E66.01    7. Mild episode of recurrent major depressive disorder (H)  F33.0 DULoxetine (CYMBALTA) 30 MG capsule   8. Hyperlipidemia LDL goal <160  E78.5 Lipid panel reflex to direct LDL Fasting     Lipid panel reflex to direct LDL Fasting   9. Grade I hemorrhoids  K64.0    10. Need for hepatitis C screening test  Z11.59 Hepatitis C Screen Reflex to HCV RNA Quant and Genotype     Hepatitis C Screen Reflex to HCV RNA Quant and Genotype   11. Screen for colon cancer  Z12.11 Adult Gastro Ref - Procedure Only   12. Screening for HIV (human immunodeficiency virus)  Z11.4 HIV Antigen Antibody Combo     HIV Antigen Antibody Combo   13. Social anxiety disorder  F40.10 DULoxetine (CYMBALTA) 30 MG capsule   14. FH: prostate cancer  Z80.42 PSA, screen     PSA, screen   15. Screening for diabetes mellitus  Z13.1 Basic metabolic panel  (Ca, Cl, CO2, Creat, Gluc, K, Na, BUN)     Basic metabolic panel  (Ca, Cl, CO2, Creat, Gluc, K, Na, BUN)   16. Screening for prostate cancer  Z12.5 PSA, screen     PSA, screen       2, 7, 13. Mood is overall stable although he would like his anxiety to just be gone. I recommend trying CBD as this  "could help not only with his mood but his chronic pain as well. I also recommend more routine exercise for his physical and mental estiven. He declines counseling at this time. If symptoms worsen, he will let me know.    3. Continue sildenafil as needed as this works well    4. Continue gabapentin and amitriptyline as pain has been stable.    5, 9, 11. History of hemorrhoids with continued intermittent rectal bleeding. No obvious hemorrhoids or fissures on exam today. Continue with a high fiber diet/fiber supplement along with plenty of fluids. He is almost 52 and his insurance may cover a colonoscopy before 50 so will order a screening colonoscopy.     6. He will continue to work on a healthier diet and has started to exercise again.    8. Updated fasting lipid panel ordered.    10, 12, 14-16. Updated screening labs ordered.    Follow up annually.     Patient has been advised of split billing requirements and indicates understanding: Yes    COUNSELING:   Reviewed preventive health counseling, as reflected in patient instructions       Regular exercise       Healthy diet/nutrition    Estimated body mass index is 39.46 kg/m  as calculated from the following:    Height as of this encounter: 1.829 m (6' 0.01\").    Weight as of this encounter: 132 kg (291 lb).     Weight management plan: Discussed healthy diet and exercise guidelines    He reports that he has never smoked. He has never used smokeless tobacco.      Counseling Resources:  ATP IV Guidelines  Pooled Cohorts Equation Calculator  FRAX Risk Assessment  ICSI Preventive Guidelines  Dietary Guidelines for Americans, 2010  USDA's MyPlate  ASA Prophylaxis  Lung CA Screening    Josh Morales PA-C  [facility]  "

## 2022-02-10 NOTE — PATIENT INSTRUCTIONS
Preventive Health Recommendations  Male Ages 40 to 49    Yearly exam:             See your health care provider every year in order to  o   Review health changes.   o   Discuss preventive care.    o   Review your medicines if your doctor has prescribed any.    You should be tested each year for STDs (sexually transmitted diseases) if you re at risk.     Have a cholesterol test every 5 years.     Have a colonoscopy (test for colon cancer) if someone in your family has had colon cancer or polyps before age 50.     After age 45, have a diabetes test (fasting glucose). If you are at risk for diabetes, you should have this test every 3 years.      Talk with your health care provider about whether or not a prostate cancer screening test (PSA) is right for you.    Shots: Get a flu shot each year. Get a tetanus shot every 10 years.     Nutrition:    Eat at least 5 servings of fruits and vegetables daily.     Eat whole-grain bread, whole-wheat pasta and brown rice instead of white grains and rice.     Get adequate Calcium and Vitamin D.     Lifestyle    Exercise for at least 150 minutes a week (30 minutes a day, 5 days a week). This will help you control your weight and prevent disease.     Limit alcohol to one drink per day.     No smoking.     Wear sunscreen to prevent skin cancer.     See your dentist every six months for an exam and cleaning.     Continue with a fiber supplement to keep stools soft.  Will order a colonoscopy. See if your insurance will cover it before 50.    Try CBD for the anxiety and continue to work on routine exercise.    Follow up annually.

## 2022-02-11 ASSESSMENT — ANXIETY QUESTIONNAIRES
IF YOU CHECKED OFF ANY PROBLEMS ON THIS QUESTIONNAIRE, HOW DIFFICULT HAVE THESE PROBLEMS MADE IT FOR YOU TO DO YOUR WORK, TAKE CARE OF THINGS AT HOME, OR GET ALONG WITH OTHER PEOPLE: SOMEWHAT DIFFICULT
1. FEELING NERVOUS, ANXIOUS, OR ON EDGE: MORE THAN HALF THE DAYS
GAD7 TOTAL SCORE: 6
7. FEELING AFRAID AS IF SOMETHING AWFUL MIGHT HAPPEN: NOT AT ALL
2. NOT BEING ABLE TO STOP OR CONTROL WORRYING: SEVERAL DAYS
3. WORRYING TOO MUCH ABOUT DIFFERENT THINGS: SEVERAL DAYS
6. BECOMING EASILY ANNOYED OR IRRITABLE: NOT AT ALL
5. BEING SO RESTLESS THAT IT IS HARD TO SIT STILL: SEVERAL DAYS

## 2022-02-11 ASSESSMENT — PATIENT HEALTH QUESTIONNAIRE - PHQ9
5. POOR APPETITE OR OVEREATING: SEVERAL DAYS
SUM OF ALL RESPONSES TO PHQ QUESTIONS 1-9: 4

## 2022-02-12 ASSESSMENT — ANXIETY QUESTIONNAIRES: GAD7 TOTAL SCORE: 6

## 2022-03-28 ENCOUNTER — TELEPHONE (OUTPATIENT)
Dept: GASTROENTEROLOGY | Facility: CLINIC | Age: 52
End: 2022-03-28
Payer: COMMERCIAL

## 2022-03-28 DIAGNOSIS — Z11.59 ENCOUNTER FOR SCREENING FOR OTHER VIRAL DISEASES: Primary | ICD-10-CM

## 2022-03-28 NOTE — TELEPHONE ENCOUNTER
Screening Questions  BlueKIND OF PREP RedLOCATION [review exclusion criteria] GreenSEDATION TYPE  1. Have you had a positive covid test in the last 90 days? n     2. Are you active on mychart? y    3. What insurance is in the chart? BXBS     3.   Ordering/Referring Provider: Andrew    4. BMI 40.4 [BMI OVER 40-EXTENDED PREP]  If greater than 40 review exclusion criteria [PAC APPT IF @ [facility]]        5.  Respiratory Screening :  [If yes to any of the following HOSPITAL setting only]     Do you use daily home oxygen? n    Do you have mod to severe Obstructive Sleep Apnea? n  [OKAY @ [facility] SH PH RI]   Do you have Pulmonary Hypertension? n     Do you have UNCONTROLLED asthma? n        6.   Have you had a heart or lung transplant? n      7.   Are you currently on dialysis? n [ If yes, G-PREP & HOSPITAL setting only]     8.   Do you have chronic kidney disease? n [ If yes, G-PREP ]    9.   Have you had a stroke or Transient ischemic attack (TIA - aka  mini stroke ) within 6 months?  n (If yes, please review exclusion criteria)    10.   In the past 6 months, have you had any heart related issues including cardiomyopathy or heart attack? n           If yes, did it require cardiac stenting or other implantable device? n      11.   Do you have any implantable devices in your body (pacemaker, defib, LVAD)? n (If yes, please review exclusion criteria)    12.   Do you take nitroglycerin? n           If yes, how often? n  (if yes, HOSPITAL setting ONLY)    13.   Are you currently taking any blood thinners? n           [IF YES, INFORM PATIENT TO FOLLOW UP W/ ORDERING PROVIDER FOR BRIDGING INSTRUCTIONS]     14.   Do you have a diagnosis of diabetes? n   [ If yes, G-PREP ]    15.   [FEMALES] Are you currently pregnant?     If yes, how many weeks?     16.   Are you taking any prescription pain medications on a routine schedule?  n  [ If yes, EXTENDED PREP.] [If yes, MAC]    17.   Do you have any chemical dependencies such as  alcohol, street drugs, or methadone?  n [If yes, MAC]    18.   Do you have any history of post-traumatic stress syndrome, severe anxiety or history of psychosis?  n  [If yes, MAC]    19.   Do you have a significant intellectual disability?  n [If yes, MAC]    20.   Do you transfer independently?  y    21.  On a regular basis do you go 3-5 days between bowel movements?y  [ If yes, EXTENDED PREP.]    22.   Preferred LOCAL Pharmacy for Pre Prescription   [facility] [id number] [facility]      Scheduling Details      Caller : Antonio Ford  (Please ask for phone number if not scheduled by patient)    Type of Procedure Scheduled: Colonoscopy  Which Colonoscopy Prep was Sent?: Miralax  KHORUTS CF PATIENTS & GROEN'S PATIENTS NEEDS EXTENDED PREP  Surgeon: Sim  Date of Procedure: 4/20  Location:       Sedation Type: CS  Conscious Sedation- Needs  for 6 hours after the procedure  MAC/General-Needs  for 24 hours after procedure    Pre-op Required at [facility] [place], [place] and OR for MAC sedation: n  (advise patient they will need a pre-op prior to procedure -)      Informed patient they will need an adult  y  Cannot take any type of public or medical transportation alone    Pre-Procedure Covid test to be completed at [facility] or Externally: y    Confirmed Nurse will call to complete assessment y    Additional comments:

## 2022-04-16 ENCOUNTER — LAB (OUTPATIENT)
Dept: URGENT CARE | Facility: URGENT CARE | Age: 52
End: 2022-04-16
Payer: COMMERCIAL

## 2022-04-16 DIAGNOSIS — Z11.59 ENCOUNTER FOR SCREENING FOR OTHER VIRAL DISEASES: ICD-10-CM

## 2022-04-16 PROCEDURE — U0005 INFEC AGEN DETEC AMPLI PROBE: HCPCS

## 2022-04-16 PROCEDURE — U0003 INFECTIOUS AGENT DETECTION BY NUCLEIC ACID (DNA OR RNA); SEVERE ACUTE RESPIRATORY SYNDROME CORONAVIRUS 2 (SARS-COV-2) (CORONAVIRUS DISEASE [COVID-19]), AMPLIFIED PROBE TECHNIQUE, MAKING USE OF HIGH THROUGHPUT TECHNOLOGIES AS DESCRIBED BY CMS-2020-01-R: HCPCS

## 2022-04-17 LAB — SARS-COV-2 RNA RESP QL NAA+PROBE: NEGATIVE

## 2022-04-20 ENCOUNTER — HOSPITAL ENCOUNTER (OUTPATIENT)
Facility: AMBULATORY SURGERY CENTER | Age: 52
Discharge: HOME | End: 2022-04-20
Attending: INTERNAL MEDICINE | Admitting: INTERNAL MEDICINE
Payer: COMMERCIAL

## 2022-04-20 VITALS
TEMPERATURE: 98.3 F | HEART RATE: 43 BPM | SYSTOLIC BLOOD PRESSURE: 97 MMHG | OXYGEN SATURATION: 97 % | RESPIRATION RATE: 16 BRPM | DIASTOLIC BLOOD PRESSURE: 67 MMHG

## 2022-04-20 LAB — COLONOSCOPY: NORMAL

## 2022-04-20 PROCEDURE — G8918 PT W/O PREOP ORDER IV AB PRO: HCPCS

## 2022-04-20 PROCEDURE — G8907 PT DOC NO EVENTS ON DISCHARG: HCPCS

## 2022-04-20 PROCEDURE — 45378 DIAGNOSTIC COLONOSCOPY: CPT

## 2022-04-20 RX ORDER — SODIUM CHLORIDE 9 MG/ML
INJECTION, SOLUTION INTRAVENOUS CONTINUOUS PRN
Status: COMPLETED | OUTPATIENT
Start: 2022-04-20 | End: 2022-04-20

## 2022-04-20 RX ORDER — NALOXONE HYDROCHLORIDE 0.4 MG/ML
0.2 INJECTION, SOLUTION INTRAMUSCULAR; INTRAVENOUS; SUBCUTANEOUS
Status: DISCONTINUED | OUTPATIENT
Start: 2022-04-20 | End: 2022-04-21 | Stop reason: HOSPADM

## 2022-04-20 RX ORDER — NALOXONE HYDROCHLORIDE 0.4 MG/ML
0.4 INJECTION, SOLUTION INTRAMUSCULAR; INTRAVENOUS; SUBCUTANEOUS
Status: DISCONTINUED | OUTPATIENT
Start: 2022-04-20 | End: 2022-04-21 | Stop reason: HOSPADM

## 2022-04-20 RX ORDER — FENTANYL CITRATE 50 UG/ML
INJECTION, SOLUTION INTRAMUSCULAR; INTRAVENOUS PRN
Status: DISCONTINUED | OUTPATIENT
Start: 2022-04-20 | End: 2022-04-20 | Stop reason: HOSPADM

## 2022-04-20 RX ORDER — LIDOCAINE 40 MG/G
CREAM TOPICAL
Status: DISCONTINUED | OUTPATIENT
Start: 2022-04-20 | End: 2022-04-21 | Stop reason: HOSPADM

## 2022-04-20 RX ORDER — ONDANSETRON 2 MG/ML
4 INJECTION INTRAMUSCULAR; INTRAVENOUS EVERY 6 HOURS PRN
Status: DISCONTINUED | OUTPATIENT
Start: 2022-04-20 | End: 2022-04-21 | Stop reason: HOSPADM

## 2022-04-20 RX ORDER — ONDANSETRON 4 MG/1
4 TABLET, ORALLY DISINTEGRATING ORAL EVERY 6 HOURS PRN
Status: DISCONTINUED | OUTPATIENT
Start: 2022-04-20 | End: 2022-04-21 | Stop reason: HOSPADM

## 2022-04-20 RX ORDER — PROCHLORPERAZINE MALEATE 10 MG
10 TABLET ORAL EVERY 6 HOURS PRN
Status: DISCONTINUED | OUTPATIENT
Start: 2022-04-20 | End: 2022-04-21 | Stop reason: HOSPADM

## 2022-04-20 RX ORDER — FLUMAZENIL 0.1 MG/ML
0.2 INJECTION, SOLUTION INTRAVENOUS
Status: DISCONTINUED | OUTPATIENT
Start: 2022-04-20 | End: 2022-04-21 | Stop reason: HOSPADM

## 2022-04-20 RX ORDER — ONDANSETRON 2 MG/ML
4 INJECTION INTRAMUSCULAR; INTRAVENOUS
Status: COMPLETED | OUTPATIENT
Start: 2022-04-20 | End: 2022-04-20

## 2022-04-20 RX ADMIN — ONDANSETRON 4 MG: 2 INJECTION INTRAMUSCULAR; INTRAVENOUS at 13:24

## 2022-04-20 NOTE — H&P
ENDOSCOPY PRE-SEDATION H&P FOR OUTPATIENT PROCEDURES    Antonio Ford  [id number]  1970    Procedure: colonoscopy    Pre-procedure diagnosis: hematochezia, screening    Past medical history:   Past Medical History:   Diagnosis Date     Allergic rhinitis, cause unspecified     Rhinitis, Allergic     Depressive disorder      Unspecified sinusitis (chronic)     Chronic sinusitis       Past surgical history:   Past Surgical History:   Procedure Laterality Date     NERVE BLOCK PERIPHERAL  08/26/2013    [facility]     NERVE BLOCK PERIPHERAL  10/14/2013    [facility]     TONSILLECTOMY  1992     VASECTOMY       ZZC NASAL/SPHENOID SINUS ENDOSCOPY,DX  2005    nasal polypectomy as well       Current Outpatient Medications   Medication     amitriptyline (ELAVIL) 50 MG tablet     cetirizine (ZYRTEC) 10 MG tablet     DULoxetine (CYMBALTA) 30 MG capsule     fluticasone (FLONASE) 50 MCG/ACT nasal spray     gabapentin (NEURONTIN) 300 MG capsule     sildenafil (VIAGRA) 50 MG tablet     Current Facility-Administered Medications   Medication     lidocaine (LMX4) kit     lidocaine 1 % 0.1-1 mL     ondansetron (ZOFRAN) injection 4 mg     sodium chloride (PF) 0.9% PF flush 3 mL     sodium chloride (PF) 0.9% PF flush 3 mL       Allergies   Allergen Reactions     Birch Trees      Cats      Dogs      Nuts      Seasonal Allergies        History of Anesthesia/Sedation Problems: no    Physical Exam:    Mental status: alert  Heart: Normal  Lung: Normal  Assessment of patient's airway: Normal  Other as pertinent for procedure: None     ASA Score: See Provation note    Mallampati score:  II - Faucial pillars and soft palate may be seen, but uvula is masked by the base of the tongue    Assessment/Plan:     The patient is an appropriate candidate to receive sedation.    Informed consent was discussed with the patient/family, including the risks, benefits, potential complications and any alternative options associated with  sedation.    Patient assessment completed just prior to sedation and while under constant observation by the provider. Condition determined to be adequate for proceeding with sedation.    The specific risks for the procedure were discussed with the patient at the time of informed consent and include but are not limited to perforation which could require surgery, missing significant neoplasm or lesion, hemorrhage and adverse sedative complication.      David Montemayor MD

## 2022-05-10 NOTE — MR AVS SNAPSHOT
After Visit Summary   11/28/2018    Antonio Ford    MRN: [id number]           Patient Information     Date Of Birth          1970        Visit Information        Provider Department      11/28/2018 7:00 PM Pamela May PA-C [facility]        Today's Diagnoses     Acute suppurative otitis media of left ear without spontaneous rupture of tympanic membrane, recurrence not specified    -  1      Care Instructions    Take antibiotic as directed with food for middle ear infection  Tylenol, motrin, decongestant for congestion/ear pressure, warm compresses next to ear, humidifier  Please follow up with primary care provider if not improving, worsening or new symptoms or for any adverse reactions to medications.            Follow-ups after your visit        Your next 10 appointments already scheduled     Dec 07, 2018  8:00 AM CST   Return Visit with Debbie REYES [facility] ([facility])    [address] [place] [id number] [phone]            Dec 28, 2018 10:00 AM CST   Return Visit with Debbie REYES [facility] ([facility])    [address] [place] [id number] [phone]              Who to contact     You can reach your care team any time of the day by calling [phone].  Notification of test results:  If you have an abnormal lab result, we will notify you by phone as soon as possible.         Additional Information About Your Visit        MyChart Information     Cooliohart gives you secure access to your electronic health record. If you see a primary care provider, you can also send messages to your care team and make appointments. If you have questions, please call your primary care clinic.  If you do not have a primary care provider, please call [phone] and they will assist you.        Care EveryWhere ID     This is your Care EveryWhere ID. This  could be used by other organizations to access your [facility] medical records  [id number]        Your Vitals Were     Pulse Temperature                74 97.5  F (36.4  C) (Oral)           Blood Pressure from Last 3 Encounters:   11/28/18 144/88   09/20/18 112/76   02/23/18 120/78    Weight from Last 3 Encounters:   09/20/18 259 lb (117.5 kg)   02/23/18 250 lb (113.4 kg)   08/25/17 234 lb (106.1 kg)              Today, you had the following     No orders found for display         Today's Medication Changes          These changes are accurate as of 11/28/18  7:13 PM.  If you have any questions, ask your nurse or doctor.               Start taking these medicines.        Dose/Directions    amoxicillin 875 MG tablet   Commonly known as:  AMOXIL   Used for:  Acute suppurative otitis media of left ear without spontaneous rupture of tympanic membrane, recurrence not specified        Dose:  875 mg   Take 1 tablet (875 mg) by mouth 2 times daily for 7 days   Quantity:  14 tablet   Refills:  0            Where to get your medicines      These medications were sent to [facility] [address] [place], [place] [id number]     Phone:  [phone]     amoxicillin 875 MG tablet                Primary Care Provider Office Phone # Fax #    Josh Morales PA-C [phone]       [address] [place] [id number]        Equal Access to Services     [facility] HospitalCYNTHIA AH: Hadii ernestina Moralez, waaxda luqadaha, qaybta kaalmada cristiano, stephanie goodman. So [facility] [phone].    ATENCIÓN: Si habla español, tiene a villasenor disposición servicios gratuitos de asistencia lingüística. Gail al [phone].    We comply with applicable federal civil rights laws and [place] laws. We do not discriminate on the basis of race, color, national origin, age, disability, sex, sexual orientation, or gender identity.            Thank you!     Thank  you for choosing [facility]  for your care. Our goal is always to provide you with excellent care. Hearing back from our patients is one way we can continue to improve our services. Please take a few minutes to complete the written survey that you may receive in the mail after your visit with us. Thank you!             Your Updated Medication List - Protect others around you: Learn how to safely use, store and throw away your medicines at www.disposemymeds.org.          This list is accurate as of 11/28/18  7:13 PM.  Always use your most recent med list.                   Brand Name Dispense Instructions for use Diagnosis    amitriptyline 50 MG tablet    ELAVIL    90 tablet    Take 1 tablet (50 mg) by mouth At Bedtime    Penile pain       amoxicillin 875 MG tablet    AMOXIL    14 tablet    Take 1 tablet (875 mg) by mouth 2 times daily for 7 days    Acute suppurative otitis media of left ear without spontaneous rupture of tympanic membrane, recurrence not specified       cetirizine 10 MG tablet    zyrTEC     Take 10 mg by mouth daily        DULoxetine 30 MG capsule    CYMBALTA    180 capsule    Take 1 capsule (30 mg) by mouth 2 times daily    Generalized anxiety disorder, Social anxiety disorder, Mild episode of recurrent major depressive disorder (H)       gabapentin 300 MG capsule    NEURONTIN    270 capsule    Take 3 capsules (900 mg) by mouth 3 times daily    Penile pain          positive S1/positive S2

## 2022-11-20 ENCOUNTER — HEALTH MAINTENANCE LETTER (OUTPATIENT)
Age: 52
End: 2022-11-20

## 2023-02-09 DIAGNOSIS — F41.1 GENERALIZED ANXIETY DISORDER: ICD-10-CM

## 2023-02-09 DIAGNOSIS — F33.0 MILD EPISODE OF RECURRENT MAJOR DEPRESSIVE DISORDER (H): ICD-10-CM

## 2023-02-09 DIAGNOSIS — F40.10 SOCIAL ANXIETY DISORDER: ICD-10-CM

## 2023-02-10 RX ORDER — DULOXETIN HYDROCHLORIDE 30 MG/1
30 CAPSULE, DELAYED RELEASE ORAL 2 TIMES DAILY
Qty: 120 CAPSULE | Refills: 0 | Status: SHIPPED | OUTPATIENT
Start: 2023-02-10 | End: 2023-04-13

## 2023-02-10 NOTE — TELEPHONE ENCOUNTER
Pending Prescriptions:                       Disp   Refills    DULoxetine (CYMBALTA) 30 MG capsule [Pharm*180 ca*0        Sig: Take 1 capsule (30 mg) by mouth 2 times daily      Routing refill request to provider for review/approval because:  Drug interaction warning    Anabela Hernandez RN on 2/10/2023 at 9:59 AM

## 2023-02-11 DIAGNOSIS — N52.9 ERECTILE DYSFUNCTION, UNSPECIFIED ERECTILE DYSFUNCTION TYPE: ICD-10-CM

## 2023-02-11 DIAGNOSIS — G89.29 CHRONIC PELVIC PAIN IN MALE: ICD-10-CM

## 2023-02-11 DIAGNOSIS — R10.2 CHRONIC PELVIC PAIN IN MALE: ICD-10-CM

## 2023-02-13 RX ORDER — GABAPENTIN 300 MG/1
900 CAPSULE ORAL 3 TIMES DAILY
Qty: 270 CAPSULE | Refills: 1 | Status: SHIPPED | OUTPATIENT
Start: 2023-02-13 | End: 2023-04-13

## 2023-02-13 RX ORDER — SILDENAFIL 50 MG/1
TABLET, FILM COATED ORAL
Qty: 20 TABLET | Refills: 0 | Status: SHIPPED | OUTPATIENT
Start: 2023-02-13 | End: 2023-04-13

## 2023-02-13 NOTE — TELEPHONE ENCOUNTER
Pending Prescriptions:                       Disp   Refills    sildenafil (VIAGRA) 50 MG tablet [Pharmacy*20 tab*0        Sig: Take 0.5-2 tablets 1-4 hours prior to intercourse    gabapentin (NEURONTIN) 300 MG capsule      270 ca*11       Sig: Take 3 capsules (900 mg) by mouth 3 times daily    Routing refill request to provider for review/approval because:  A break in medication  Patient needs to be seen because it has been more than 1 year since last office visit.

## 2023-02-26 DIAGNOSIS — G89.29 CHRONIC PELVIC PAIN IN MALE: ICD-10-CM

## 2023-02-26 DIAGNOSIS — R10.2 CHRONIC PELVIC PAIN IN MALE: ICD-10-CM

## 2023-03-01 RX ORDER — AMITRIPTYLINE HYDROCHLORIDE 50 MG/1
TABLET ORAL
Qty: 90 TABLET | Refills: 0 | Status: SHIPPED | OUTPATIENT
Start: 2023-03-01 | End: 2023-04-13

## 2023-03-01 NOTE — TELEPHONE ENCOUNTER
JM patient. KV covering. Surekha refills sent. Due for annual physical.       BERRY Gongora CNP  Questions or concerns please feel free to send me a Vacunek message or call me  Phone : [phone]

## 2023-03-01 NOTE — TELEPHONE ENCOUNTER
"Requested Prescriptions   Pending Prescriptions Disp Refills    amitriptyline (ELAVIL) 50 MG tablet [Pharmacy Med Name: Amitriptyline HCl Oral Tablet 50 MG] 90 tablet 0     Sig: TAKE ONE TABLET BY MOUTH AT BEDTIME       Tricyclic Agents ( Annual appt and no PHQ9) Failed - 2/26/2023 11:43 PM        Failed - Recent (12 mo) or future (30 days) visit within authorizing provider's specialty     Patient has had an office visit with the authorizing provider or a provider within the authorizing providers department within the previous 12 mos or has a future within next 30 days. See \"Patient Info\" tab in inbasket, or \"Choose Columns\" in Meds & Orders section of the refill encounter.              Passed - Blood Pressure under 140/90 in past 12 mos     BP Readings from Last 3 Encounters:   04/20/22 97/67   02/10/22 128/88   11/25/20 134/76                 Passed - Medication is active on med list        Passed - Patient is age 18 or older             "

## 2023-04-13 ENCOUNTER — OFFICE VISIT (OUTPATIENT)
Dept: FAMILY MEDICINE | Facility: OTHER | Age: 53
End: 2023-04-13
Payer: COMMERCIAL

## 2023-04-13 VITALS
SYSTOLIC BLOOD PRESSURE: 112 MMHG | TEMPERATURE: 96.5 F | WEIGHT: 279 LBS | OXYGEN SATURATION: 96 % | DIASTOLIC BLOOD PRESSURE: 84 MMHG | HEART RATE: 75 BPM | BODY MASS INDEX: 39.06 KG/M2 | HEIGHT: 71 IN

## 2023-04-13 DIAGNOSIS — E78.5 HYPERLIPIDEMIA LDL GOAL <160: ICD-10-CM

## 2023-04-13 DIAGNOSIS — Z00.00 ENCOUNTER FOR ROUTINE ADULT HEALTH EXAMINATION WITHOUT ABNORMAL FINDINGS: Primary | ICD-10-CM

## 2023-04-13 DIAGNOSIS — R10.2 CHRONIC PELVIC PAIN IN MALE: ICD-10-CM

## 2023-04-13 DIAGNOSIS — N52.9 ERECTILE DYSFUNCTION, UNSPECIFIED ERECTILE DYSFUNCTION TYPE: ICD-10-CM

## 2023-04-13 DIAGNOSIS — G89.29 CHRONIC PELVIC PAIN IN MALE: ICD-10-CM

## 2023-04-13 DIAGNOSIS — S63.632A SPRAIN OF INTERPHALANGEAL JOINT OF RIGHT MIDDLE FINGER, INITIAL ENCOUNTER: ICD-10-CM

## 2023-04-13 DIAGNOSIS — F40.10 SOCIAL ANXIETY DISORDER: ICD-10-CM

## 2023-04-13 DIAGNOSIS — Z12.5 SCREENING FOR PROSTATE CANCER: ICD-10-CM

## 2023-04-13 DIAGNOSIS — F41.1 GENERALIZED ANXIETY DISORDER: ICD-10-CM

## 2023-04-13 DIAGNOSIS — Z13.1 SCREENING FOR DIABETES MELLITUS: ICD-10-CM

## 2023-04-13 DIAGNOSIS — E66.01 MORBID OBESITY (H): ICD-10-CM

## 2023-04-13 DIAGNOSIS — F33.0 MILD EPISODE OF RECURRENT MAJOR DEPRESSIVE DISORDER (H): ICD-10-CM

## 2023-04-13 DIAGNOSIS — Z80.42 FH: PROSTATE CANCER: ICD-10-CM

## 2023-04-13 PROCEDURE — 0134A COVID-19 VACCINE BIVALENT BOOSTER 18+ (MODERNA): CPT | Performed by: PHYSICIAN ASSISTANT

## 2023-04-13 PROCEDURE — 99396 PREV VISIT EST AGE 40-64: CPT | Mod: 25 | Performed by: PHYSICIAN ASSISTANT

## 2023-04-13 PROCEDURE — 99214 OFFICE O/P EST MOD 30 MIN: CPT | Mod: 25 | Performed by: PHYSICIAN ASSISTANT

## 2023-04-13 PROCEDURE — 91313 COVID-19 VACCINE BIVALENT BOOSTER 18+ (MODERNA): CPT | Performed by: PHYSICIAN ASSISTANT

## 2023-04-13 RX ORDER — AMITRIPTYLINE HYDROCHLORIDE 50 MG/1
TABLET ORAL
Qty: 90 TABLET | Refills: 3 | Status: SHIPPED | OUTPATIENT
Start: 2023-04-13 | End: 2024-05-31

## 2023-04-13 RX ORDER — SILDENAFIL 50 MG/1
TABLET, FILM COATED ORAL
Qty: 20 TABLET | Refills: 5 | Status: SHIPPED | OUTPATIENT
Start: 2023-04-13 | End: 2024-06-05

## 2023-04-13 RX ORDER — DULOXETIN HYDROCHLORIDE 30 MG/1
30 CAPSULE, DELAYED RELEASE ORAL 2 TIMES DAILY
Qty: 180 CAPSULE | Refills: 3 | Status: SHIPPED | OUTPATIENT
Start: 2023-04-13 | End: 2024-04-19

## 2023-04-13 RX ORDER — GABAPENTIN 300 MG/1
900 CAPSULE ORAL 3 TIMES DAILY
Qty: 270 CAPSULE | Refills: 11 | Status: SHIPPED | OUTPATIENT
Start: 2023-04-13 | End: 2024-06-05

## 2023-04-13 ASSESSMENT — ENCOUNTER SYMPTOMS
FEVER: 0
PALPITATIONS: 0
ABDOMINAL PAIN: 0
CONSTIPATION: 1
CHILLS: 0
SORE THROAT: 0
COUGH: 1
DIZZINESS: 0
NAUSEA: 0
WEAKNESS: 0
MYALGIAS: 1
EYE PAIN: 0
HEARTBURN: 0
FREQUENCY: 0
ARTHRALGIAS: 0
NERVOUS/ANXIOUS: 1
HEADACHES: 0
HEMATURIA: 0
DIARRHEA: 0
SHORTNESS OF BREATH: 0
PARESTHESIAS: 0
JOINT SWELLING: 0
HEMATOCHEZIA: 1
DYSURIA: 0

## 2023-04-13 ASSESSMENT — PAIN SCALES - GENERAL: PAINLEVEL: MILD PAIN (2)

## 2023-04-13 ASSESSMENT — ANXIETY QUESTIONNAIRES
GAD7 TOTAL SCORE: 8
IF YOU CHECKED OFF ANY PROBLEMS ON THIS QUESTIONNAIRE, HOW DIFFICULT HAVE THESE PROBLEMS MADE IT FOR YOU TO DO YOUR WORK, TAKE CARE OF THINGS AT HOME, OR GET ALONG WITH OTHER PEOPLE: SOMEWHAT DIFFICULT
3. WORRYING TOO MUCH ABOUT DIFFERENT THINGS: MORE THAN HALF THE DAYS
4. TROUBLE RELAXING: SEVERAL DAYS
7. FEELING AFRAID AS IF SOMETHING AWFUL MIGHT HAPPEN: SEVERAL DAYS
7. FEELING AFRAID AS IF SOMETHING AWFUL MIGHT HAPPEN: SEVERAL DAYS
6. BECOMING EASILY ANNOYED OR IRRITABLE: SEVERAL DAYS
2. NOT BEING ABLE TO STOP OR CONTROL WORRYING: SEVERAL DAYS
1. FEELING NERVOUS, ANXIOUS, OR ON EDGE: MORE THAN HALF THE DAYS
8. IF YOU CHECKED OFF ANY PROBLEMS, HOW DIFFICULT HAVE THESE MADE IT FOR YOU TO DO YOUR WORK, TAKE CARE OF THINGS AT HOME, OR GET ALONG WITH OTHER PEOPLE?: SOMEWHAT DIFFICULT
GAD7 TOTAL SCORE: 8
5. BEING SO RESTLESS THAT IT IS HARD TO SIT STILL: NOT AT ALL
GAD7 TOTAL SCORE: 8

## 2023-04-13 ASSESSMENT — PATIENT HEALTH QUESTIONNAIRE - PHQ9
SUM OF ALL RESPONSES TO PHQ QUESTIONS 1-9: 4
SUM OF ALL RESPONSES TO PHQ QUESTIONS 1-9: 4
10. IF YOU CHECKED OFF ANY PROBLEMS, HOW DIFFICULT HAVE THESE PROBLEMS MADE IT FOR YOU TO DO YOUR WORK, TAKE CARE OF THINGS AT HOME, OR GET ALONG WITH OTHER PEOPLE: SOMEWHAT DIFFICULT

## 2023-04-13 NOTE — PROGRESS NOTES
SUBJECTIVE:   CC: Antonio is an 52 year old who presents for preventative health visit.        View : No data to display.            Patient has been advised of split billing requirements and indicates understanding: Yes  Healthy Habits:     Getting at least 3 servings of Calcium per day:  Yes    Bi-annual eye exam:  Yes    Dental care twice a year:  Yes    Sleep apnea or symptoms of sleep apnea:  Excessive snoring    Diet:  Regular (no restrictions)    Frequency of exercise:  2-3 days/week    Duration of exercise:  15-30 minutes    Taking medications regularly:  Yes    Medication side effects:  None    PHQ-2 Total Score: 1    Additional concerns today:  Yes    Five days ago while in [place], he fell and jammed/bent middle finger on right hand. It was initially swollen and bruised and this is improved but he still cannot flex the finger completely. No deformity and pain is improving.     He has had a dry cough for the past month. He had a cold but the cough never went away. The cough seemed to improve when he was in [place] and then returned when he got back. He took a Zyrtec today which seemed to help.    Today's PHQ-2 Score:       4/13/2023     1:34 PM   PHQ-2 ( 1999 [employer])   Q1: Little interest or pleasure in doing things 0   Q2: Feeling down, depressed or hopeless 1   PHQ-2 Score 1   Q1: Little interest or pleasure in doing things Not at all   Q2: Feeling down, depressed or hopeless Several days   PHQ-2 Score 1       Social History     Tobacco Use     Smoking status: Never     Smokeless tobacco: Never   Vaping Use     Vaping status: Never Used   Substance Use Topics     Alcohol use: Yes     Comment: couple drinks a week           4/13/2023     1:34 PM   Alcohol Use   Prescreen: >3 drinks/day or >7 drinks/week? No       Last PSA:   PSA   Date Value Ref Range Status   11/25/2020 0.77 0 - 4 ug/L Final     Comment:     Assay Method:  Chemiluminescence using Siemens Vista analyzer     Prostate Specific Antigen Screen  "  Date Value Ref Range Status   02/10/2022 1.40 0.00 - 4.00 ug/L Final       Reviewed orders with patient. Reviewed health maintenance and updated orders accordingly - Yes    Reviewed and updated as needed this visit by clinical staff   Tobacco  Allergies  Meds  Problems  Med Hx  Surg Hx  Fam Hx          Reviewed and updated as needed this visit by Provider   Tobacco  Allergies  Meds  Problems  Med Hx  Surg Hx  Fam Hx           Review of Systems   Constitutional: Negative for chills and fever.   HENT: Positive for congestion. Negative for ear pain, hearing loss and sore throat.    Eyes: Negative for pain and visual disturbance.   Respiratory: Positive for cough. Negative for shortness of breath.    Cardiovascular: Negative for chest pain, palpitations and peripheral edema.   Gastrointestinal: Positive for constipation and hematochezia. Negative for abdominal pain, diarrhea, heartburn and nausea.   Genitourinary: Positive for impotence. Negative for dysuria, frequency, genital sores, hematuria, penile discharge and urgency.   Musculoskeletal: Positive for myalgias. Negative for arthralgias and joint swelling.   Skin: Negative for rash.   Neurological: Negative for dizziness, weakness, headaches and paresthesias.   Psychiatric/Behavioral: Negative for mood changes. The patient is nervous/anxious.        OBJECTIVE:   /84 (BP Location: Right arm, Patient Position: Sitting, Cuff Size: Adult Large)   Pulse 75   Temp (!) 96.5  F (35.8  C) (Temporal)   Ht 1.803 m (5' 11\")   Wt 126.6 kg (279 lb)   SpO2 96%   BMI 38.91 kg/m      Physical Exam  GENERAL: healthy, alert and no distress  EYES: Eyes grossly normal to inspection, PERRL and conjunctivae and sclerae normal  HENT: ear canals and TM's normal, nose and mouth without ulcers or lesions  NECK: no adenopathy, no asymmetry, masses, or scars and thyroid normal to palpation  RESP: lungs clear to auscultation - no rales, rhonchi or wheezes  CV: regular " rate and rhythm, normal S1 S2, no S3 or S4, no murmur, click or rub, no peripheral edema and peripheral pulses strong  ABDOMEN: soft, nontender, no hepatosplenomegaly, no masses and bowel sounds normal   (male): normal male circumcised genitalia without lesions or urethral discharge, no hernia. Right anterior testicular nodule felt initially then not felt again. History of benign testicular calcifications on previous US.   MS: Mild right 3rd finger DIP joint swelling with slightly decreased flexion. No tenderness to palpation or bony deformity. FROM to all extremities. No spinal tenderness.   SKIN: no suspicious lesions or rashes  NEURO: Normal strength and tone, mentation intact and speech normal. Cranial nerves II-XII are grossly intact. DTRs are 2+/4 throughout and symmetric. Gait is stable.  PSYCH: mentation appears normal, affect normal/bright      ASSESSMENT/PLAN:       ICD-10-CM    1. Encounter for routine adult health examination without abnormal findings  Z00.00       2. Chronic pelvic pain in male  R10.2 gabapentin (NEURONTIN) 300 MG capsule    G89.29 amitriptyline (ELAVIL) 50 MG tablet      3. Generalized anxiety disorder  F41.1 DULoxetine (CYMBALTA) 30 MG capsule      4. Mild episode of recurrent major depressive disorder (H)  F33.0 DULoxetine (CYMBALTA) 30 MG capsule      5. Social anxiety disorder  F40.10 DULoxetine (CYMBALTA) 30 MG capsule      6. Hyperlipidemia LDL goal <160  E78.5 Lipid panel reflex to direct LDL Fasting      7. Erectile dysfunction, unspecified erectile dysfunction type  N52.9 sildenafil (VIAGRA) 50 MG tablet      8. Sprain of interphalangeal joint of right middle finger, initial encounter  S66.300G       9. FH: prostate cancer  Z80.42 PSA, screen      10. Screening for prostate cancer  Z12.5 PSA, screen      11. Morbid obesity (H)  E66.01       12. Screening for diabetes mellitus  Z13.1 Basic metabolic panel  (Ca, Cl, CO2, Creat, Gluc, K, Na, BUN)          2. Continue current  medications. Chronic pelvic pain is stable.    3-5. Mood is stable on current medications.    6, 11. Updated fasting lipid panel to be completed next week. He is working on a healthier diet and more routine exercise and has been losing some weight.     7. Continue Viagra as needed.    8. Right DIP middle finger sprain. No evidence of fracture. Continue with rest, ice and ibuprofen as needed.    9-10. Will check PSA next week.    12. Fasting labs to be completed next week.    Follow-up annually.      Patient has been advised of split billing requirements and indicates understanding: Yes      COUNSELING:   Reviewed preventive health counseling, as reflected in patient instructions       Regular exercise       Healthy diet/nutrition        He reports that he has never smoked. He has never used smokeless tobacco.    Josh Morales PA-C  [facility]  Answers for HPI/ROS submitted by the patient on 4/13/2023  If you checked off any problems, how difficult have these problems made it for you to do your work, take care of things at home, or get along with other people?: Somewhat difficult  PHQ9 TOTAL SCORE: 4  FRANCISCO JAVIER 7 TOTAL SCORE: 8

## 2023-04-13 NOTE — PATIENT INSTRUCTIONS
Preventive Health Recommendations  Male Ages 50 - 64    Yearly exam:             See your health care provider every year in order to  o   Review health changes.   o   Discuss preventive care.    o   Review your medicines if your doctor has prescribed any.   Have a cholesterol test every 5 years, or more frequently if you are at risk for high cholesterol/heart disease.   Have a diabetes test (fasting glucose) every three years. If you are at risk for diabetes, you should have this test more often.   Have a colonoscopy at age 50, or have a yearly FIT test (stool test). These exams will check for colon cancer.    Talk with your health care provider about whether or not a prostate cancer screening test (PSA) is right for you.  You should be tested each year for STDs (sexually transmitted diseases), if you re at risk.     Shots: Get a flu shot each year. Get a tetanus shot every 10 years.     Nutrition:  Eat at least 5 servings of fruits and vegetables daily.   Eat whole-grain bread, whole-wheat pasta and brown rice instead of white grains and rice.   Get adequate Calcium and Vitamin D.     Lifestyle  Exercise for at least 150 minutes a week (30 minutes a day, 5 days a week). This will help you control your weight and prevent disease.   Limit alcohol to one drink per day.   No smoking.   Wear sunscreen to prevent skin cancer.   See your dentist every six months for an exam and cleaning.   See your eye doctor every 1 to 2 years.    Continue current medications.    Follow-up annually.

## 2023-04-20 ENCOUNTER — LAB (OUTPATIENT)
Dept: LAB | Facility: OTHER | Age: 53
End: 2023-04-20
Payer: COMMERCIAL

## 2023-04-20 DIAGNOSIS — E78.5 HYPERLIPIDEMIA LDL GOAL <160: ICD-10-CM

## 2023-04-20 DIAGNOSIS — Z80.42 FH: PROSTATE CANCER: ICD-10-CM

## 2023-04-20 DIAGNOSIS — Z12.5 SCREENING FOR PROSTATE CANCER: ICD-10-CM

## 2023-04-20 DIAGNOSIS — Z13.1 SCREENING FOR DIABETES MELLITUS: ICD-10-CM

## 2023-04-20 LAB
ANION GAP SERPL CALCULATED.3IONS-SCNC: 10 MMOL/L (ref 7–15)
BUN SERPL-MCNC: 22.3 MG/DL (ref 6–20)
CALCIUM SERPL-MCNC: 8.9 MG/DL (ref 8.6–10)
CHLORIDE SERPL-SCNC: 105 MMOL/L (ref 98–107)
CHOLEST SERPL-MCNC: 169 MG/DL
CREAT SERPL-MCNC: 0.89 MG/DL (ref 0.67–1.17)
DEPRECATED HCO3 PLAS-SCNC: 25 MMOL/L (ref 22–29)
GFR SERPL CREATININE-BSD FRML MDRD: >90 ML/MIN/1.73M2
GLUCOSE SERPL-MCNC: 100 MG/DL (ref 70–99)
HDLC SERPL-MCNC: 23 MG/DL
LDLC SERPL CALC-MCNC: 107 MG/DL
NONHDLC SERPL-MCNC: 146 MG/DL
POTASSIUM SERPL-SCNC: 4.2 MMOL/L (ref 3.4–5.3)
PSA SERPL DL<=0.01 NG/ML-MCNC: 0.63 NG/ML (ref 0–3.5)
SODIUM SERPL-SCNC: 140 MMOL/L (ref 136–145)
TRIGL SERPL-MCNC: 195 MG/DL

## 2023-04-20 PROCEDURE — 80061 LIPID PANEL: CPT

## 2023-04-20 PROCEDURE — G0103 PSA SCREENING: HCPCS

## 2023-04-20 PROCEDURE — 80048 BASIC METABOLIC PNL TOTAL CA: CPT

## 2023-04-20 PROCEDURE — 36415 COLL VENOUS BLD VENIPUNCTURE: CPT

## 2023-12-12 NOTE — TELEPHONE ENCOUNTER
Responded via [employer].   Suggest evisit to discuss stopping medication.     Vickei Enamorado, RN, BSN     No indicators present

## 2024-03-14 ENCOUNTER — PATIENT OUTREACH (OUTPATIENT)
Dept: CARE COORDINATION | Facility: CLINIC | Age: 54
End: 2024-03-14
Payer: COMMERCIAL

## 2024-03-28 ENCOUNTER — PATIENT OUTREACH (OUTPATIENT)
Dept: CARE COORDINATION | Facility: CLINIC | Age: 54
End: 2024-03-28
Payer: COMMERCIAL

## 2024-04-19 ENCOUNTER — MYC REFILL (OUTPATIENT)
Dept: FAMILY MEDICINE | Facility: OTHER | Age: 54
End: 2024-04-19
Payer: COMMERCIAL

## 2024-04-19 DIAGNOSIS — F33.0 MILD EPISODE OF RECURRENT MAJOR DEPRESSIVE DISORDER (H): ICD-10-CM

## 2024-04-19 DIAGNOSIS — F40.10 SOCIAL ANXIETY DISORDER: ICD-10-CM

## 2024-04-19 DIAGNOSIS — F41.1 GENERALIZED ANXIETY DISORDER: ICD-10-CM

## 2024-04-19 RX ORDER — DULOXETIN HYDROCHLORIDE 30 MG/1
30 CAPSULE, DELAYED RELEASE ORAL 2 TIMES DAILY
Qty: 120 CAPSULE | Refills: 0 | Status: SHIPPED | OUTPATIENT
Start: 2024-04-19 | End: 2024-06-05

## 2024-04-19 NOTE — TELEPHONE ENCOUNTER
Left detailed message of providers note below as patient had just called about this medication.    Katheryn Marin RN on 4/19/2024 at 4:43 PM

## 2024-04-19 NOTE — TELEPHONE ENCOUNTER
Patient calling in regarding this prescription and saying he is completely out. Let him know that this refill request was routed to his provider and we ask that patient's give us 72 business hours for these request. He is wondering what will happen if he stops taking this over the weekend if he can't get his refill today. Will route to PCP high priority.    Katheryn Marin RN on 4/19/2024 at 4:41 PM

## 2024-05-30 DIAGNOSIS — R10.2 CHRONIC PELVIC PAIN IN MALE: ICD-10-CM

## 2024-05-30 DIAGNOSIS — G89.29 CHRONIC PELVIC PAIN IN MALE: ICD-10-CM

## 2024-05-31 RX ORDER — AMITRIPTYLINE HYDROCHLORIDE 50 MG/1
TABLET ORAL
Qty: 90 TABLET | Refills: 0 | Status: SHIPPED | OUTPATIENT
Start: 2024-05-31 | End: 2024-06-05

## 2024-06-05 ENCOUNTER — OFFICE VISIT (OUTPATIENT)
Dept: FAMILY MEDICINE | Facility: OTHER | Age: 54
End: 2024-06-05
Payer: COMMERCIAL

## 2024-06-05 VITALS
HEART RATE: 78 BPM | BODY MASS INDEX: 37.8 KG/M2 | DIASTOLIC BLOOD PRESSURE: 82 MMHG | SYSTOLIC BLOOD PRESSURE: 126 MMHG | TEMPERATURE: 97.7 F | OXYGEN SATURATION: 97 % | RESPIRATION RATE: 18 BRPM | HEIGHT: 71 IN | WEIGHT: 270 LBS

## 2024-06-05 DIAGNOSIS — N52.9 ERECTILE DYSFUNCTION, UNSPECIFIED ERECTILE DYSFUNCTION TYPE: ICD-10-CM

## 2024-06-05 DIAGNOSIS — F33.0 MILD EPISODE OF RECURRENT MAJOR DEPRESSIVE DISORDER (H): ICD-10-CM

## 2024-06-05 DIAGNOSIS — G89.29 CHRONIC PELVIC PAIN IN MALE: ICD-10-CM

## 2024-06-05 DIAGNOSIS — E66.01 CLASS 2 SEVERE OBESITY DUE TO EXCESS CALORIES WITH SERIOUS COMORBIDITY AND BODY MASS INDEX (BMI) OF 37.0 TO 37.9 IN ADULT (H): ICD-10-CM

## 2024-06-05 DIAGNOSIS — Z13.1 SCREENING FOR DIABETES MELLITUS: ICD-10-CM

## 2024-06-05 DIAGNOSIS — Z80.42 FH: PROSTATE CANCER: ICD-10-CM

## 2024-06-05 DIAGNOSIS — Z00.00 ENCOUNTER FOR ROUTINE ADULT HEALTH EXAMINATION WITHOUT ABNORMAL FINDINGS: Primary | ICD-10-CM

## 2024-06-05 DIAGNOSIS — F41.1 GENERALIZED ANXIETY DISORDER: ICD-10-CM

## 2024-06-05 DIAGNOSIS — J30.2 SEASONAL ALLERGIC RHINITIS, UNSPECIFIED TRIGGER: ICD-10-CM

## 2024-06-05 DIAGNOSIS — Z12.5 SCREENING FOR PROSTATE CANCER: ICD-10-CM

## 2024-06-05 DIAGNOSIS — E78.5 HYPERLIPIDEMIA LDL GOAL <160: ICD-10-CM

## 2024-06-05 DIAGNOSIS — F40.10 SOCIAL ANXIETY DISORDER: ICD-10-CM

## 2024-06-05 DIAGNOSIS — E66.812 CLASS 2 SEVERE OBESITY DUE TO EXCESS CALORIES WITH SERIOUS COMORBIDITY AND BODY MASS INDEX (BMI) OF 37.0 TO 37.9 IN ADULT (H): ICD-10-CM

## 2024-06-05 DIAGNOSIS — R10.2 CHRONIC PELVIC PAIN IN MALE: ICD-10-CM

## 2024-06-05 LAB
ANION GAP SERPL CALCULATED.3IONS-SCNC: 12 MMOL/L (ref 7–15)
BUN SERPL-MCNC: 17.2 MG/DL (ref 6–20)
CALCIUM SERPL-MCNC: 9.6 MG/DL (ref 8.6–10)
CHLORIDE SERPL-SCNC: 104 MMOL/L (ref 98–107)
CHOLEST SERPL-MCNC: 252 MG/DL
CREAT SERPL-MCNC: 0.85 MG/DL (ref 0.67–1.17)
DEPRECATED HCO3 PLAS-SCNC: 22 MMOL/L (ref 22–29)
EGFRCR SERPLBLD CKD-EPI 2021: >90 ML/MIN/1.73M2
FASTING STATUS PATIENT QL REPORTED: YES
FASTING STATUS PATIENT QL REPORTED: YES
GLUCOSE SERPL-MCNC: 93 MG/DL (ref 70–99)
HDLC SERPL-MCNC: 36 MG/DL
LDLC SERPL CALC-MCNC: 173 MG/DL
NONHDLC SERPL-MCNC: 216 MG/DL
POTASSIUM SERPL-SCNC: 4.4 MMOL/L (ref 3.4–5.3)
PSA SERPL DL<=0.01 NG/ML-MCNC: 0.79 NG/ML (ref 0–3.5)
SODIUM SERPL-SCNC: 138 MMOL/L (ref 135–145)
TRIGL SERPL-MCNC: 214 MG/DL

## 2024-06-05 PROCEDURE — G0103 PSA SCREENING: HCPCS | Performed by: PHYSICIAN ASSISTANT

## 2024-06-05 PROCEDURE — 99214 OFFICE O/P EST MOD 30 MIN: CPT | Mod: 25 | Performed by: PHYSICIAN ASSISTANT

## 2024-06-05 PROCEDURE — 80061 LIPID PANEL: CPT | Performed by: PHYSICIAN ASSISTANT

## 2024-06-05 PROCEDURE — 99396 PREV VISIT EST AGE 40-64: CPT | Mod: 25 | Performed by: PHYSICIAN ASSISTANT

## 2024-06-05 PROCEDURE — 80048 BASIC METABOLIC PNL TOTAL CA: CPT | Performed by: PHYSICIAN ASSISTANT

## 2024-06-05 PROCEDURE — 36415 COLL VENOUS BLD VENIPUNCTURE: CPT | Performed by: PHYSICIAN ASSISTANT

## 2024-06-05 RX ORDER — DULOXETIN HYDROCHLORIDE 30 MG/1
30 CAPSULE, DELAYED RELEASE ORAL 2 TIMES DAILY
Qty: 180 CAPSULE | Refills: 3 | Status: SHIPPED | OUTPATIENT
Start: 2024-06-05

## 2024-06-05 RX ORDER — SILDENAFIL 50 MG/1
TABLET, FILM COATED ORAL
Qty: 20 TABLET | Refills: 5 | Status: SHIPPED | OUTPATIENT
Start: 2024-06-05

## 2024-06-05 RX ORDER — AMITRIPTYLINE HYDROCHLORIDE 50 MG/1
TABLET ORAL
Qty: 90 TABLET | Refills: 3 | Status: SHIPPED | OUTPATIENT
Start: 2024-06-05

## 2024-06-05 RX ORDER — FLUTICASONE PROPIONATE 50 MCG
2 SPRAY, SUSPENSION (ML) NASAL DAILY
Qty: 15.8 ML | Refills: 2 | Status: SHIPPED | OUTPATIENT
Start: 2024-06-05

## 2024-06-05 RX ORDER — GABAPENTIN 300 MG/1
900 CAPSULE ORAL 3 TIMES DAILY
Qty: 270 CAPSULE | Refills: 11 | Status: SHIPPED | OUTPATIENT
Start: 2024-06-05

## 2024-06-05 SDOH — HEALTH STABILITY: PHYSICAL HEALTH: ON AVERAGE, HOW MANY MINUTES DO YOU ENGAGE IN EXERCISE AT THIS LEVEL?: 30 MIN

## 2024-06-05 SDOH — HEALTH STABILITY: PHYSICAL HEALTH: ON AVERAGE, HOW MANY DAYS PER WEEK DO YOU ENGAGE IN MODERATE TO STRENUOUS EXERCISE (LIKE A BRISK WALK)?: 3 DAYS

## 2024-06-05 ASSESSMENT — PAIN SCALES - GENERAL: PAINLEVEL: NO PAIN (0)

## 2024-06-05 ASSESSMENT — SOCIAL DETERMINANTS OF HEALTH (SDOH): HOW OFTEN DO YOU GET TOGETHER WITH FRIENDS OR RELATIVES?: ONCE A WEEK

## 2024-06-05 NOTE — PATIENT INSTRUCTIONS
"Continue current medications.  Follow-up annually.  Preventive Care Advice   This is general advice we often give to help people stay healthy. Your care team may have specific advice just for you. Please talk to your care team about your own preventive care needs.  Lifestyle  Exercise at least 150 minutes each week (30 minutes a day, 5 days a week).  Do muscle strengthening activities 2 days a week. These help control your weight and prevent disease.  No smoking.  Wear sunscreen to prevent skin cancer.  Have your home tested for radon every 2 to 5 years. Radon is a colorless, odorless gas that can harm your lungs. To learn more, go to www.health.[facility].mn.us and search for \"Radon in Homes.\"  Keep guns unloaded and locked up in a safe place like a safe or gun vault, or, use a gun lock and hide the keys. Always lock away bullets separately. To learn more, visit [employer].[place].gov and search for \"safe gun storage.\"  Nutrition  Eat 5 or more servings of fruits and vegetables each day.  Try wheat bread, brown rice and whole grain pasta (instead of white bread, rice, and pasta).  Get enough calcium and vitamin D. Check the label on foods and aim for 100% of the RDA (recommended daily allowance).  Regular exams  Have a dental exam and cleaning every 6 months.  See your health care team every year to talk about:  Any changes in your health.  Any medicines your care team has prescribed.  Preventive care, family planning, and ways to prevent chronic diseases.  Shots (vaccines)   HPV shots (up to age 26), if you've never had them before.  Hepatitis B shots (up to age 59), if you've never had them before.  COVID-19 shot: Get this shot when it's due.  Flu shot: Get a flu shot every year.  Tetanus shot: Get a tetanus shot every 10 years.  Pneumococcal, hepatitis A, and RSV shots: Ask your care team if you need these based on your risk.  Shingles shot (for age 50 and up).  General health tests  Diabetes screening:  Starting at age 35, Get " screened for diabetes at least every 3 years.  If you are younger than age 35, ask your care team if you should be screened for diabetes.  Cholesterol test: At age 39, start having a cholesterol test every 5 years, or more often if advised.  Bone density scan (DEXA): At age 50, ask your care team if you should have this scan for osteoporosis (brittle bones).  Hepatitis C: Get tested at least once in your life.  Abdominal aortic aneurysm screening: Talk to your doctor about having this screening if you:  Have ever smoked; and  Are biologically male; and  Are between the ages of 65 and 75.  STIs (sexually transmitted infections)  Before age 24: Ask your care team if you should be screened for STIs.  After age 24: Get screened for STIs if you're at risk. You are at risk for STIs (including HIV) if:  You are sexually active with more than one person.  You don't use condoms every time.  You or a partner was diagnosed with a sexually transmitted infection.  If you are at risk for HIV, ask about PrEP medicine to prevent HIV.  Get tested for HIV at least once in your life, whether you are at risk for HIV or not.  Cancer screening tests  Cervical cancer screening: If you have a cervix, begin getting regular cervical cancer screening tests at age 21. Most people who have regular screenings with normal results can stop after age 65. Talk about this with your provider.  Breast cancer scan (mammogram): If you've ever had breasts, begin having regular mammograms starting at age 40. This is a scan to check for breast cancer.  Colon cancer screening: It is important to start screening for colon cancer at age 45.  Have a colonoscopy test every 10 years (or more often if you're at risk) Or, ask your provider about stool tests like a FIT test every year or Cologuard test every 3 years.  To learn more about your testing options, visit: www.[employer]/429810.pdf.  For help making a decision, visit: obey/dt97639.  Prostate cancer  screening test: If you have a prostate and are age 55 to 69, ask your provider if you would benefit from a yearly prostate cancer screening test.  Lung cancer screening: If you are a current or former smoker age 50 to 80, ask your care team if ongoing lung cancer screenings are right for you.  For informational purposes only. Not to replace the advice of your health care provider. Copyright   2023 [facility]. All rights reserved. Clinically reviewed by the [facility] Transitions Program. 2345.com 365809 - REV 04/24.    Learning About Stress  What is stress?     Stress is your body's response to a hard situation. Your body can have a physical, emotional, or mental response. Stress is a fact of life for most people, and it affects everyone differently. What causes stress for you may not be stressful for someone else.  A lot of things can cause stress. You may feel stress when you go on a job interview, take a test, or run a race. This kind of short-term stress is normal and even useful. It can help you if you need to work hard or react quickly. For example, stress can help you finish an important job on time.  Long-term stress is caused by ongoing stressful situations or events. Examples of long-term stress include long-term health problems, ongoing problems at work, or conflicts in your family. Long-term stress can harm your health.  How does stress affect your health?  When you are stressed, your body responds as though you are in danger. It makes hormones that speed up your heart, make you breathe faster, and give you a burst of energy. This is called the fight-or-flight stress response. If the stress is over quickly, your body goes back to normal and no harm is done.  But if stress happens too often or lasts too long, it can have bad effects. Long-term stress can make you more likely to get sick, and it can make symptoms of some diseases worse. If you tense up when you are stressed, you may  develop neck, shoulder, or low back pain. Stress is linked to high blood pressure and heart disease.  Stress also harms your emotional health. It can make you don, tense, or depressed. Your relationships may suffer, and you may not do well at work or school.  What can you do to manage stress?  You can try these things to help manage stress:   Do something active. Exercise or activity can help reduce stress. Walking is a great way to get started. Even everyday activities such as housecleaning or yard work can help.  Try yoga or chikis chi. These techniques combine exercise and meditation. You may need some training at first to learn them.  Do something you enjoy. For example, listen to music or go to a movie. Practice your hobby or do volunteer work.  Meditate. This can help you relax, because you are not worrying about what happened before or what may happen in the future.  Do guided imagery. Imagine yourself in any setting that helps you feel calm. You can use online videos, books, or a teacher to guide you.  Do breathing exercises. For example:  From a standing position, bend forward from the waist with your knees slightly bent. Let your arms dangle close to the floor.  Breathe in slowly and deeply as you return to a standing position. Roll up slowly and lift your head last.  Hold your breath for just a few seconds in the standing position.  Breathe out slowly and bend forward from the waist.  Let your feelings out. Talk, laugh, cry, and express anger when you need to. Talking with supportive friends or family, a counselor, or a skyler leader about your feelings is a healthy way to relieve stress. Avoid discussing your feelings with people who make you feel worse.  Write. It may help to write about things that are bothering you. This helps you find out how much stress you feel and what is causing it. When you know this, you can find better ways to cope.  What can you do to prevent stress?  You might try some of  "these things to help prevent stress:  Manage your time. This helps you find time to do the things you want and need to do.  Get enough sleep. Your body recovers from the stresses of the day while you are sleeping.  Get support. Your family, friends, and community can make a difference in how you experience stress.  Limit your news feed. Avoid or limit time on social media or news that may make you feel stressed.  Do something active. Exercise or activity can help reduce stress. Walking is a great way to get started.  Where can you learn more?  Go to https://www.[employer].net/patiented  Enter N032 in the search box to learn more about \"Learning About Stress.\"  Current as of: October 24, 2023               Content Version: 14.0    7707-9213 [employer].   Care instructions adapted under license by your healthcare professional. If you have questions about a medical condition or this instruction, always ask your healthcare professional. [employer] disclaims any warranty or liability for your use of this information.      "

## 2024-06-05 NOTE — PROGRESS NOTES
Preventive Care Visit  [facility]  Josh Morales PA-C, Family Medicine  Jun 5, 2024    Assessment & Plan       ICD-10-CM    1. Encounter for routine adult health examination without abnormal findings  Z00.00       2. Chronic pelvic pain in male  R10.2 amitriptyline (ELAVIL) 50 MG tablet    G89.29 gabapentin (NEURONTIN) 300 MG capsule      3. Generalized anxiety disorder  F41.1 DULoxetine (CYMBALTA) 30 MG capsule      4. Mild episode of recurrent major depressive disorder (H24)  F33.0 DULoxetine (CYMBALTA) 30 MG capsule      5. Social anxiety disorder  F40.10 DULoxetine (CYMBALTA) 30 MG capsule      6. Seasonal allergic rhinitis, unspecified trigger  J30.2 fluticasone (FLONASE) 50 MCG/ACT nasal spray      7. Erectile dysfunction, unspecified erectile dysfunction type  N52.9 sildenafil (VIAGRA) 50 MG tablet      8. Hyperlipidemia LDL goal <160  E78.5 Lipid panel reflex to direct LDL Fasting     Lipid panel reflex to direct LDL Fasting      9. FH: prostate cancer  Z80.42       10. Screening for diabetes mellitus  Z13.1 Basic metabolic panel  (Ca, Cl, CO2, Creat, Gluc, K, Na, BUN)     Basic metabolic panel  (Ca, Cl, CO2, Creat, Gluc, K, Na, BUN)      11. Screening for prostate cancer  Z12.5 PSA, screen     PSA, screen      12. Class 2 severe obesity due to excess calories with serious comorbidity and body mass index (BMI) of 37.0 to 37.9 in adult (H)  E66.01     Z68.37           2. Continue current medications as pain remains stable on current medications.    3-5. Mood overall stable on Cymbalta. He asked about long-term use and if mood symptoms can worsen over time if the body gets used to a medication. This certainly can happen but many patients do well on medications chronically. If mood worsens, we can consider a different medication.    6. Continue Flonase.    7. Continue Viagra as needed. The 50 mg dose has been less effective lately so okay to take up to 100 mg as needed.    8, 12.  "Fasting lipid panel ordered. He has been losing some weight and will continue to work on a healthier, lower carb diet and routine exercise.    9, 11. Updated PSA ordered.    10. Fasting BMP ordered.     He declines vaccines today but will think about Shingrix further.      Patient has been advised of split billing requirements and indicates understanding: Yes      BMI  Estimated body mass index is 37.8 kg/m  as calculated from the following:    Height as of this encounter: 1.8 m (5' 10.87\").    Weight as of this encounter: 122.5 kg (270 lb).   Weight management plan: Discussed healthy diet and exercise guidelines    Counseling  Appropriate preventive services were discussed with this patient, including applicable screening as appropriate for fall prevention, nutrition, physical activity, Tobacco-use cessation, weight loss and cognition.  Checklist reviewing preventive services available has been given to the patient.  Reviewed patient's diet, addressing concerns and/or questions.   He is at risk for lack of exercise and has been provided with information to increase physical activity for the benefit of his well-being.   The patient's PHQ-9 score is consistent with mild depression. He was provided with information regarding depression.       Can Alvarez is a 53 year old, presenting for the following:  Physical        6/5/2024     1:12 PM   Additional Questions   Roomed by Karine JACOBO         6/5/2024     1:12 PM   Patient Reported Additional Medications   Patient reports taking the following new medications None        Health Care Directive  Patient does not have a Health Care Directive or Living Will: Discussed advance care planning with patient; however, patient declined at this time.    HPI    Anxiety and chronic pain are overall stable.          6/5/2024   General Health   How would you rate your overall physical health? (!) FAIR   Feel stress (tense, anxious, or unable to sleep) Rather much   (!) STRESS " CONCERN      6/5/2024   Nutrition   Three or more servings of calcium each day? Yes   Diet: Regular (no restrictions)   How many servings of fruit and vegetables per day? (!) 2-3   How many sweetened beverages each day? 0-1         6/5/2024   Exercise   Days per week of moderate/strenous exercise 3 days   Average minutes spent exercising at this level 30 min         6/5/2024   Social Factors   Frequency of gathering with friends or relatives Once a week   Worry food won't last until get money to buy more No   Food not last or not have enough money for food? No   Do you have housing?  Yes   Are you worried about losing your housing? No   Lack of transportation? No   Unable to get utilities (heat,electricity)? No         6/5/2024   Fall Risk   Fallen 2 or more times in the past year? No   Trouble with walking or balance? No          6/5/2024   Dental   Dentist two times every year? Yes         6/5/2024   TB Screening   Were you born outside of the [place]? No       Today's PHQ-9 Score:       6/5/2024    12:24 PM   PHQ-9 SCORE   PHQ-9 Total Score MyChart 5 (Mild depression)   PHQ-9 Total Score 5         6/5/2024   Substance Use   Alcohol more than 3/day or more than 7/wk No   Do you use any other substances recreationally? (!) ALCOHOL     Social History     Tobacco Use    Smoking status: Never    Smokeless tobacco: Never   Vaping Use    Vaping status: Never Used   Substance Use Topics    Alcohol use: Yes     Comment: couple drinks a week    Drug use: No           6/5/2024   STI Screening   New sexual partner(s) since last STI/HIV test? No   ASCVD Risk   The 10-year ASCVD risk score (Renetta JONES, et al., 2019) is: 6.5%    Values used to calculate the score:      Age: 53 years      Sex: Male      Is Non- : No      Diabetic: No      Tobacco smoker: No      Systolic Blood Pressure: 126 mmHg      Is BP treated: No      HDL Cholesterol: 23 mg/dL      Total Cholesterol: 169 mg/dL      Reviewed and  "updated as needed this visit by Provider   Tobacco  Allergies  Meds  Problems  Med Hx  Surg Hx  Fam Hx              Review of Systems  Constitutional, HEENT, cardiovascular, pulmonary, GI, , musculoskeletal, neuro, skin, endocrine and psych systems are negative, except as otherwise noted.     Objective    Exam  /82   Pulse 78   Temp 97.7  F (36.5  C) (Temporal)   Resp 18   Ht 1.8 m (5' 10.87\")   Wt 122.5 kg (270 lb)   SpO2 97%   BMI 37.80 kg/m     Estimated body mass index is 37.8 kg/m  as calculated from the following:    Height as of this encounter: 1.8 m (5' 10.87\").    Weight as of this encounter: 122.5 kg (270 lb).    Physical Exam  GENERAL: healthy, alert and no distress  EYES: Eyes grossly normal to inspection, PERRL and conjunctivae and sclerae normal  HENT: ear canals and TM's normal, nose and mouth without ulcers or lesions  NECK: no adenopathy, no asymmetry, masses, or scars and thyroid normal to palpation  RESP: lungs clear to auscultation - no rales, rhonchi or wheezes  CV: regular rate and rhythm, normal S1 S2, no S3 or S4, no murmur, click or rub, no peripheral edema and peripheral pulses strong  ABDOMEN: soft, nontender, no hepatosplenomegaly, no masses and bowel sounds normal  MS: no gross musculoskeletal defects noted, no edema. FROM to all extremities. No spinal tenderness.   SKIN: no suspicious lesions or rashes  NEURO: Normal strength and tone, mentation intact and speech normal. Cranial nerves II-XII are grossly intact. DTRs are 2+/4 throughout and symmetric. Gait is stable.  PSYCH: mentation appears normal, affect normal/bright    Signed Electronically by: Josh Morales PA-C    Answers submitted by the patient for this visit:  Patient Health Questionnaire (Submitted on 6/5/2024)  If you checked off any problems, how difficult have these problems made it for you to do your work, take care of things at home, or get along with other people?: Somewhat difficult  PHQ9 " TOTAL SCORE: 5

## 2025-03-03 ASSESSMENT — PATIENT HEALTH QUESTIONNAIRE - PHQ9
10. IF YOU CHECKED OFF ANY PROBLEMS, HOW DIFFICULT HAVE THESE PROBLEMS MADE IT FOR YOU TO DO YOUR WORK, TAKE CARE OF THINGS AT HOME, OR GET ALONG WITH OTHER PEOPLE: NOT DIFFICULT AT ALL
SUM OF ALL RESPONSES TO PHQ QUESTIONS 1-9: 4
SUM OF ALL RESPONSES TO PHQ QUESTIONS 1-9: 4

## 2025-03-04 ENCOUNTER — OFFICE VISIT (OUTPATIENT)
Dept: FAMILY MEDICINE | Facility: CLINIC | Age: 55
End: 2025-03-04
Payer: COMMERCIAL

## 2025-03-04 VITALS
DIASTOLIC BLOOD PRESSURE: 86 MMHG | SYSTOLIC BLOOD PRESSURE: 126 MMHG | OXYGEN SATURATION: 96 % | TEMPERATURE: 97.6 F | WEIGHT: 281 LBS | HEIGHT: 71 IN | BODY MASS INDEX: 39.34 KG/M2 | HEART RATE: 71 BPM | RESPIRATION RATE: 18 BRPM

## 2025-03-04 DIAGNOSIS — H69.93 DYSFUNCTION OF BOTH EUSTACHIAN TUBES: ICD-10-CM

## 2025-03-04 DIAGNOSIS — H10.31 ACUTE BACTERIAL CONJUNCTIVITIS OF RIGHT EYE: Primary | ICD-10-CM

## 2025-03-04 DIAGNOSIS — J30.2 SEASONAL ALLERGIC RHINITIS, UNSPECIFIED TRIGGER: ICD-10-CM

## 2025-03-04 PROCEDURE — 99213 OFFICE O/P EST LOW 20 MIN: CPT | Performed by: PHYSICIAN ASSISTANT

## 2025-03-04 PROCEDURE — 3074F SYST BP LT 130 MM HG: CPT | Performed by: PHYSICIAN ASSISTANT

## 2025-03-04 PROCEDURE — G2211 COMPLEX E/M VISIT ADD ON: HCPCS | Performed by: PHYSICIAN ASSISTANT

## 2025-03-04 PROCEDURE — 3079F DIAST BP 80-89 MM HG: CPT | Performed by: PHYSICIAN ASSISTANT

## 2025-03-04 PROCEDURE — 1125F AMNT PAIN NOTED PAIN PRSNT: CPT | Performed by: PHYSICIAN ASSISTANT

## 2025-03-04 RX ORDER — FLUTICASONE PROPIONATE 50 MCG
2 SPRAY, SUSPENSION (ML) NASAL DAILY
Qty: 16 G | Refills: 3 | Status: SHIPPED | OUTPATIENT
Start: 2025-03-04

## 2025-03-04 RX ORDER — POLYMYXIN B SULFATE AND TRIMETHOPRIM 1; 10000 MG/ML; [USP'U]/ML
1 SOLUTION OPHTHALMIC EVERY 4 HOURS
Qty: 10 ML | Refills: 0 | Status: SHIPPED | OUTPATIENT
Start: 2025-03-04 | End: 2025-03-11

## 2025-03-04 ASSESSMENT — PAIN SCALES - GENERAL: PAINLEVEL_OUTOF10: MODERATE PAIN (4)

## 2025-03-04 NOTE — PROGRESS NOTES
Assessment & Plan     Acute bacterial conjunctivitis of right eye  - polymixin b-trimethoprim (POLYTRIM) 88589-8.1 UNIT/ML-% ophthalmic solution; Place 1 drop into both eyes every 4 hours for 7 days.  Elected to treat with B but discussed etiology could be viral, bacterial or allergic.  Important to follow up if any worsening or persistent problems or concerns.    Dysfunction of both eustachian tubes  Etiology discussed.  No evidence of ASOM.  Antibiotics not recommended at this time  Restart Flonase and use for the next 2-4 weeks.  Follow up if persists    Seasonal allergic rhinitis, unspecified trigger  - fluticasone (FLONASE) 50 MCG/ACT nasal spray; Spray 2 sprays into both nostrils daily.      See Patient Instructions    Subjective   Antonio is a 54 year old, presenting for the following health issues:        Otalgia and Red Eye  HPI:  Dull pain left ear pain and red itchy right eye.   Started with febrile illness 3 weeks ago.  Had significant congestion for several days which has mostly resolved.   Left ear has had intermittent pain for the past 2 weeks. Some days is better. Does not wake him up and is generally mild. Denies facial pain and pressure and no purulent nasal discharge or fever.  Continues to have minor sinus congestion with clear rhinorrhea.    Right eye has been itchy and red with crusting in the morning.  No pain, swelling or visual disturbance.     Patient has recently restarted his cetirizine.  Also has used Flonase in the past but not currently.  Usually has significant allergy symptoms closer to May.          3/4/2025     8:51 AM   Additional Questions   Roomed by YK   Accompanied by self     History of Present Illness       Reason for visit:  Left Ear pain. Right eye redness, itchiness  Symptom onset:  3-4 weeks ago  Symptoms include:  Left ear pain , dry red eyes.  Right worse  Symptom intensity:  Moderate  Symptom progression:  Staying the same  Had these symptoms before:  No  What makes it  "worse:  Evening   He is taking medications regularly.              Objective    /86   Pulse 71   Temp 97.6  F (36.4  C) (Temporal)   Resp 18   Ht 1.81 m (5' 11.26\")   Wt 127.5 kg (281 lb)   SpO2 96%   BMI 38.91 kg/m    Body mass index is 38.91 kg/m .  Physical Exam  Constitutional:       Appearance: Normal appearance. He is obese.   HENT:      Right Ear: Tympanic membrane, ear canal and external ear normal.      Left Ear: Tympanic membrane, ear canal and external ear normal.      Nose: Congestion present. No rhinorrhea.      Comments: Nasal mucosa pale and boggy.  Inflammation of the turbinates on the right.  No facial pain or pressure with palpation over the sinuses.     Mouth/Throat:      Mouth: Mucous membranes are dry.      Pharynx: Oropharynx is clear. No oropharyngeal exudate or posterior oropharyngeal erythema.   Eyes:      General: Lids are normal.         Right eye: No discharge.         Left eye: No discharge.      Extraocular Movements: Extraocular movements intact.      Conjunctiva/sclera:      Right eye: Right conjunctiva is injected (bulbar surface. No mattering or purulent discharge noted.).      Left eye: Left conjunctiva is not injected.   Cardiovascular:      Rate and Rhythm: Normal rate and regular rhythm.      Heart sounds: Normal heart sounds. No murmur heard.  Pulmonary:      Effort: Pulmonary effort is normal. No respiratory distress.      Breath sounds: Normal breath sounds. No wheezing, rhonchi or rales.   Musculoskeletal:      Cervical back: Normal range of motion.   Lymphadenopathy:      Cervical: No cervical adenopathy.   Skin:     General: Skin is warm and dry.   Neurological:      Mental Status: He is alert.            Signed Electronically by: Kathy Bay PA-C    "

## 2025-03-04 NOTE — PATIENT INSTRUCTIONS
It was a pleasure meeting you and we appreciate your choosing the [facility].    Stefanie Bay PA-C

## 2025-05-06 ENCOUNTER — PATIENT OUTREACH (OUTPATIENT)
Dept: CARE COORDINATION | Facility: CLINIC | Age: 55
End: 2025-05-06
Payer: COMMERCIAL

## 2025-05-20 ENCOUNTER — PATIENT OUTREACH (OUTPATIENT)
Dept: CARE COORDINATION | Facility: CLINIC | Age: 55
End: 2025-05-20
Payer: COMMERCIAL

## 2025-07-24 ENCOUNTER — OFFICE VISIT (OUTPATIENT)
Dept: FAMILY MEDICINE | Facility: OTHER | Age: 55
End: 2025-07-24
Payer: COMMERCIAL

## 2025-07-24 VITALS
WEIGHT: 275 LBS | DIASTOLIC BLOOD PRESSURE: 80 MMHG | HEART RATE: 72 BPM | RESPIRATION RATE: 18 BRPM | HEIGHT: 71 IN | OXYGEN SATURATION: 97 % | TEMPERATURE: 97 F | BODY MASS INDEX: 38.5 KG/M2 | SYSTOLIC BLOOD PRESSURE: 122 MMHG

## 2025-07-24 DIAGNOSIS — L72.9 INFECTED CYST OF SKIN: ICD-10-CM

## 2025-07-24 DIAGNOSIS — Z12.5 SCREENING FOR PROSTATE CANCER: ICD-10-CM

## 2025-07-24 DIAGNOSIS — Z23 NEED FOR TDAP VACCINATION: ICD-10-CM

## 2025-07-24 DIAGNOSIS — Z13.1 SCREENING FOR DIABETES MELLITUS: ICD-10-CM

## 2025-07-24 DIAGNOSIS — R10.2 CHRONIC PELVIC PAIN IN MALE: ICD-10-CM

## 2025-07-24 DIAGNOSIS — E78.5 HYPERLIPIDEMIA LDL GOAL <160: ICD-10-CM

## 2025-07-24 DIAGNOSIS — G89.29 CHRONIC PELVIC PAIN IN MALE: ICD-10-CM

## 2025-07-24 DIAGNOSIS — E66.01 MORBID OBESITY (H): ICD-10-CM

## 2025-07-24 DIAGNOSIS — F40.10 SOCIAL ANXIETY DISORDER: ICD-10-CM

## 2025-07-24 DIAGNOSIS — F33.0 MILD EPISODE OF RECURRENT MAJOR DEPRESSIVE DISORDER: ICD-10-CM

## 2025-07-24 DIAGNOSIS — Z00.00 ENCOUNTER FOR ROUTINE ADULT HEALTH EXAMINATION WITHOUT ABNORMAL FINDINGS: Primary | ICD-10-CM

## 2025-07-24 DIAGNOSIS — F41.1 GENERALIZED ANXIETY DISORDER: ICD-10-CM

## 2025-07-24 DIAGNOSIS — N52.9 ERECTILE DYSFUNCTION, UNSPECIFIED ERECTILE DYSFUNCTION TYPE: ICD-10-CM

## 2025-07-24 DIAGNOSIS — L08.9 INFECTED CYST OF SKIN: ICD-10-CM

## 2025-07-24 LAB
ALBUMIN SERPL BCG-MCNC: 4.4 G/DL (ref 3.5–5.2)
ALP SERPL-CCNC: 79 U/L (ref 40–150)
ALT SERPL W P-5'-P-CCNC: 19 U/L (ref 0–70)
ANION GAP SERPL CALCULATED.3IONS-SCNC: 10 MMOL/L (ref 7–15)
AST SERPL W P-5'-P-CCNC: 22 U/L (ref 0–45)
BILIRUB SERPL-MCNC: 0.4 MG/DL
BUN SERPL-MCNC: 22.3 MG/DL (ref 6–20)
CALCIUM SERPL-MCNC: 9.5 MG/DL (ref 8.8–10.4)
CHLORIDE SERPL-SCNC: 107 MMOL/L (ref 98–107)
CHOLEST SERPL-MCNC: 235 MG/DL
CREAT SERPL-MCNC: 0.96 MG/DL (ref 0.67–1.17)
EGFRCR SERPLBLD CKD-EPI 2021: >90 ML/MIN/1.73M2
FASTING STATUS PATIENT QL REPORTED: YES
FASTING STATUS PATIENT QL REPORTED: YES
GLUCOSE SERPL-MCNC: 95 MG/DL (ref 70–99)
HCO3 SERPL-SCNC: 23 MMOL/L (ref 22–29)
HDLC SERPL-MCNC: 31 MG/DL
LDLC SERPL CALC-MCNC: 174 MG/DL
NONHDLC SERPL-MCNC: 204 MG/DL
POTASSIUM SERPL-SCNC: 4.2 MMOL/L (ref 3.4–5.3)
PROT SERPL-MCNC: 7.3 G/DL (ref 6.4–8.3)
PSA SERPL DL<=0.01 NG/ML-MCNC: 0.87 NG/ML (ref 0–3.5)
SODIUM SERPL-SCNC: 140 MMOL/L (ref 135–145)
TRIGL SERPL-MCNC: 149 MG/DL

## 2025-07-24 RX ORDER — AMITRIPTYLINE HYDROCHLORIDE 50 MG/1
TABLET ORAL
Qty: 90 TABLET | Refills: 3 | Status: SHIPPED | OUTPATIENT
Start: 2025-07-24

## 2025-07-24 RX ORDER — DULOXETIN HYDROCHLORIDE 30 MG/1
30 CAPSULE, DELAYED RELEASE ORAL 2 TIMES DAILY
Qty: 180 CAPSULE | Refills: 3 | Status: SHIPPED | OUTPATIENT
Start: 2025-07-24

## 2025-07-24 RX ORDER — TADALAFIL 10 MG/1
TABLET ORAL
Qty: 12 TABLET | Refills: 5 | Status: SHIPPED | OUTPATIENT
Start: 2025-07-24

## 2025-07-24 RX ORDER — GABAPENTIN 300 MG/1
900 CAPSULE ORAL 3 TIMES DAILY
Qty: 270 CAPSULE | Refills: 3 | Status: SHIPPED | OUTPATIENT
Start: 2025-07-24

## 2025-07-24 RX ORDER — DOXYCYCLINE 100 MG/1
100 CAPSULE ORAL 2 TIMES DAILY
Qty: 28 CAPSULE | Refills: 0 | Status: SHIPPED | OUTPATIENT
Start: 2025-07-24

## 2025-07-24 RX ORDER — SILDENAFIL 50 MG/1
TABLET, FILM COATED ORAL
Qty: 20 TABLET | Refills: 5 | Status: CANCELLED | OUTPATIENT
Start: 2025-07-24

## 2025-07-24 SDOH — HEALTH STABILITY: PHYSICAL HEALTH: ON AVERAGE, HOW MANY MINUTES DO YOU ENGAGE IN EXERCISE AT THIS LEVEL?: 30 MIN

## 2025-07-24 SDOH — HEALTH STABILITY: PHYSICAL HEALTH: ON AVERAGE, HOW MANY DAYS PER WEEK DO YOU ENGAGE IN MODERATE TO STRENUOUS EXERCISE (LIKE A BRISK WALK)?: 3 DAYS

## 2025-07-24 ASSESSMENT — PAIN SCALES - GENERAL: PAINLEVEL_OUTOF10: MILD PAIN (2)

## 2025-07-24 ASSESSMENT — SOCIAL DETERMINANTS OF HEALTH (SDOH): HOW OFTEN DO YOU GET TOGETHER WITH FRIENDS OR RELATIVES?: ONCE A WEEK

## 2025-07-24 NOTE — PROGRESS NOTES
Preventive Care Visit  [facility]  Josh Morales PA-C, Family Medicine  Jul 24, 2025    Assessment & Plan       ICD-10-CM    1. Encounter for routine adult health examination without abnormal findings  Z00.00       2. Chronic pelvic pain in male  R10.2 amitriptyline (ELAVIL) 50 MG tablet    G89.29 gabapentin (NEURONTIN) 300 MG capsule      3. Generalized anxiety disorder  F41.1 DULoxetine (CYMBALTA) 30 MG capsule      4. Mild episode of recurrent major depressive disorder  F33.0 DULoxetine (CYMBALTA) 30 MG capsule      5. Social anxiety disorder  F40.10 DULoxetine (CYMBALTA) 30 MG capsule      6. Erectile dysfunction, unspecified erectile dysfunction type  N52.9 tadalafil (CIALIS) 10 MG tablet      7. Hyperlipidemia LDL goal <160  E78.5 Lipid panel reflex to direct LDL Fasting     Lipid panel reflex to direct LDL Fasting      8. Morbid obesity (H)  E66.01       9. Infected cyst of skin  L72.9 doxycycline hyclate (VIBRAMYCIN) 100 MG capsule    L08.9       10. Screening for diabetes mellitus  Z13.1 Comprehensive metabolic panel (BMP + Alb, Alk Phos, ALT, AST, Total. Bili, TP)     Comprehensive metabolic panel (BMP + Alb, Alk Phos, ALT, AST, Total. Bili, TP)      11. Screening for prostate cancer  Z12.5 PSA, screen     PSA, screen      12. Need for Tdap vaccination  Z23 TDAP 10-64Y (ADACEL,BOOSTRIX)          1, 11. Annual exam completed along with Tdap vaccination.    2. His chronic pelvic pain is overall stable on gabapentin and amitriptyline without any side effects.     3-5. Mood has been stable on Cymbalta.    6. Will try tadalafil instead of sildenafil for his ED symptoms. Side effect profile is similar to sildenafil.    7-8. Updated fasting lipid panel ordered. We discussed the importance of a healthier, lower carb diet with a routine exercise regimen, even if he starts with just walking.     9. Will treat with 14 days of doxycycline along with warm compresses. If not resolving, will  "consider incision and drainage in the office.    10-11. Updated screening labs ordered.    The longitudinal plan of care for the diagnosis(es)/condition(s) as documented were addressed during this visit. Due to the added complexity in care, I will continue to support Antonio in the subsequent management and with ongoing continuity of care.    Follow-up annually.      Patient has been advised of split billing requirements and indicates understanding: Yes    BMI  Estimated body mass index is 38.08 kg/m  as calculated from the following:    Height as of this encounter: 1.81 m (5' 11.26\").    Weight as of this encounter: 124.7 kg (275 lb).   Weight management plan: Discussed healthy diet and exercise guidelines    Counseling  Appropriate preventive services were addressed with this patient via screening, questionnaire, or discussion as appropriate for fall prevention, nutrition, physical activity, Tobacco-use cessation, social engagement, weight loss and cognition.  Checklist reviewing preventive services available has been given to the patient.  Reviewed patient's diet, addressing concerns and/or questions.   He is at risk for lack of exercise and has been provided with information to increase physical activity for the benefit of his well-being.   He is at risk for psychosocial distress and has been provided with information to reduce risk.       Subjective   Antonio is a 55 year old, presenting for the following:  Physical        7/24/2025     9:16 AM   Additional Questions   Roomed by Jadyn WITT    There is an inflamed zit/cyst behind his right ear that has been present for almost 2 months. It will occasionally drain some pus but then will fill back up and be painful again. He wants to get rid of this.    He would like to see if there is an alternative to sildenafil for erectile issues as it does not work all the time.    Advance Care Planning  Discussed advance care planning with patient; informed AVS has link " to [employer].        7/24/2025   General Health   How would you rate your overall physical health? Good   Feel stress (tense, anxious, or unable to sleep) Rather much   (!) STRESS CONCERN      7/24/2025   Nutrition   Three or more servings of calcium each day? Yes   Diet: Regular (no restrictions)   How many servings of fruit and vegetables per day? (!) 2-3   How many sweetened beverages each day? 0-1         7/24/2025   Exercise   Days per week of moderate/strenous exercise 3 days   Average minutes spent exercising at this level 30 min         7/24/2025   Social Factors   Frequency of gathering with friends or relatives Once a week   Worry food won't last until get money to buy more No   Food not last or not have enough money for food? No   Do you have housing? (Housing is defined as stable permanent housing and does not include staying outside in a car, in a tent, in an abandoned building, in an overnight shelter, or couch-surfing.) Yes   Are you worried about losing your housing? No   Lack of transportation? No   Unable to get utilities (heat,electricity)? No         7/24/2025   Fall Risk   Fallen 2 or more times in the past year? No   Trouble with walking or balance? No          7/24/2025   Dental   Dentist two times every year? Yes       Today's PHQ-9 Score:       3/3/2025    10:54 PM   PHQ-9 SCORE   PHQ-9 Total Score MyChart 4 (Minimal depression)   PHQ-9 Total Score 4        Patient-reported           7/24/2025   Substance Use   Alcohol more than 3/day or more than 7/wk No   Do you use any other substances recreationally? No     Social History     Tobacco Use    Smoking status: Never    Smokeless tobacco: Never   Vaping Use    Vaping status: Never Used   Substance Use Topics    Alcohol use: Yes     Comment: couple drinks a week    Drug use: No           7/24/2025   STI Screening   New sexual partner(s) since last STI/HIV test? No   ASCVD Risk   The 10-year ASCVD risk score (Renetta JONES, et al.,  "2019) is: 7.8%    Values used to calculate the score:      Age: 55 years      Sex: Male      Is Non- : No      Diabetic: No      Tobacco smoker: No      Systolic Blood Pressure: 122 mmHg      Is BP treated: No      HDL Cholesterol: 36 mg/dL      Total Cholesterol: 252 mg/dL      Reviewed and updated as needed this visit by Provider   Tobacco  Allergies  Meds  Problems  Med Hx  Surg Hx  Fam Hx            Review of Systems  Constitutional, HEENT, cardiovascular, pulmonary, GI, , musculoskeletal, neuro, skin, endocrine and psych systems are negative, except as otherwise noted.     Objective    Exam  /80   Pulse 72   Temp 97  F (36.1  C) (Temporal)   Resp 18   Ht 1.81 m (5' 11.26\")   Wt 124.7 kg (275 lb)   SpO2 97%   BMI 38.08 kg/m     Estimated body mass index is 38.08 kg/m  as calculated from the following:    Height as of this encounter: 1.81 m (5' 11.26\").    Weight as of this encounter: 124.7 kg (275 lb).    Physical Exam  GENERAL: healthy, alert and no distress  EYES: Eyes grossly normal to inspection, PERRL and conjunctivae and sclerae normal  HENT: ear canals and TM's normal, nose and mouth without ulcers or lesions  NECK: no adenopathy, no asymmetry, masses, or scars and thyroid normal to palpation  RESP: lungs clear to auscultation - no rales, rhonchi or wheezes  CV: regular rate and rhythm, normal S1 S2, no S3 or S4, no murmur, click or rub, no peripheral edema and peripheral pulses strong  ABDOMEN: soft, nontender, no hepatosplenomegaly, no masses and bowel sounds normal  MS: no gross musculoskeletal defects noted, no edema. FROM to all extremities. No spinal tenderness.   SKIN: There is a slightly erythematous, minimally fluctuant cystic lesion behind the right ear. No discharge or warmth.   NEURO: Normal strength and tone, mentation intact and speech normal. Cranial nerves II-XII are grossly intact. DTRs are 2+/4 throughout and symmetric. Gait is " stable.  PSYCH: mentation appears normal, affect normal/bright      Signed Electronically by: Josh Morales PA-C

## 2025-07-24 NOTE — NURSING NOTE
Prior to immunization administration, verified patients identity using patient s name and date of birth. Please see Immunization Activity for additional information.     Screening Questionnaire for Adult Immunization    Are you sick today?   No   Do you have allergies to medications, food, a vaccine component or latex?   Yes   Have you ever had a serious reaction after receiving a vaccination?   No   Do you have a long-term health problem with heart, lung, kidney, or metabolic disease (e.g., diabetes), asthma, a blood disorder, no spleen, complement component deficiency, a cochlear implant, or a spinal fluid leak?  Are you on long-term aspirin therapy?   No   Do you have cancer, leukemia, HIV/AIDS, or any other immune system problem?   No   Do you have a parent, brother, or sister with an immune system problem?   No   In the past 3 months, have you taken medications that affect  your immune system, such as prednisone, other steroids, or anticancer drugs; drugs for the treatment of rheumatoid arthritis, Crohn s disease, or psoriasis; or have you had radiation treatments?   No   Have you had a seizure, or a brain or other nervous system problem?   No   During the past year, have you received a transfusion of blood or blood    products, or been given immune (gamma) globulin or antiviral drug?   No   For women: Are you pregnant or is there a chance you could become       pregnant during the next month?   No   Have you received any vaccinations in the past 4 weeks?   No     Immunization questionnaire was positive for at least one answer.  Notified Josh Morales- allergies verified at 9:50am.      Patient instructed to remain in clinic for 15 minutes afterwards, and to report any adverse reactions.     Screening performed by Karine Adams MA on 7/24/2025 at 9:57 AM.

## 2025-07-24 NOTE — PATIENT INSTRUCTIONS
Patient Education     Will try generic Cialis instead of sildenafil.    Will place you on 2 weeks of doxycycline for the cyst. Use warm compresses as well. If not improving, let me know.  Preventive Care Advice   This is general advice given by our system to help you stay healthy. However, your care team may have specific advice just for you. Please talk to your care team about your preventive care needs.  Nutrition  Eat 5 or more servings of fruits and vegetables each day.  Try wheat bread, brown rice and whole grain pasta (instead of white bread, rice, and pasta).  Get enough calcium and vitamin D. Check the label on foods and aim for 100% of the RDA (recommended daily allowance).  Lifestyle  Exercise at least 150 minutes each week  (30 minutes a day, 5 days a week).  Do muscle strengthening activities 2 days a week. These help control your weight and prevent disease.  No smoking.  Wear sunscreen to prevent skin cancer.  Have a dental exam and cleaning every 6 months.  Yearly exams  See your health care team every year to talk about:  Any changes in your health.  Any medicines your care team has prescribed.  Preventive care, family planning, and ways to prevent chronic diseases.  Shots (vaccines)   HPV shots (up to age 26), if you've never had them before.  Hepatitis B shots (up to age 59), if you've never had them before.  COVID-19 shot: Get this shot when it's due.  Flu shot: Get a flu shot every year.  Tetanus shot: Get a tetanus shot every 10 years.  Pneumococcal, hepatitis A, and RSV shots: Ask your care team if you need these based on your risk.  Shingles shot (for age 50 and up)  General health tests  Diabetes screening:  Starting at age 35, Get screened for diabetes at least every 3 years.  If you are younger than age 35, ask your care team if you should be screened for diabetes.  Cholesterol test: At age 39, start having a cholesterol test every 5 years, or more often if advised.  Bone density scan  (DEXA): At age 50, ask your care team if you should have this scan for osteoporosis (brittle bones).  Hepatitis C: Get tested at least once in your life.  STIs (sexually transmitted infections)  Before age 24: Ask your care team if you should be screened for STIs.  After age 24: Get screened for STIs if you're at risk. You are at risk for STIs (including HIV) if:  You are sexually active with more than one person.  You don't use condoms every time.  You or a partner was diagnosed with a sexually transmitted infection.  If you are at risk for HIV, ask about PrEP medicine to prevent HIV.  Get tested for HIV at least once in your life, whether you are at risk for HIV or not.  Cancer screening tests  Cervical cancer screening: If you have a cervix, begin getting regular cervical cancer screening tests starting at age 21.  Breast cancer scan (mammogram): If you've ever had breasts, begin having regular mammograms starting at age 40. This is a scan to check for breast cancer.  Colon cancer screening: It is important to start screening for colon cancer at age 45.  Have a colonoscopy test every 10 years (or more often if you're at risk) Or, ask your provider about stool tests like a FIT test every year or Cologuard test every 3 years.  To learn more about your testing options, visit:   .  For help making a decision, visit:   https://bit.ly/xo53436.  Prostate cancer screening test: If you have a prostate, ask your care team if a prostate cancer screening test (PSA) at age 55 is right for you.  Lung cancer screening: If you are a current or former smoker ages 50 to 80, ask your care team if ongoing lung cancer screenings are right for you.  For informational purposes only. Not to replace the advice of your health care provider. Copyright   2023 [employer]. All rights reserved. Clinically reviewed by the [facility] Transitions Program. Thumb [id number] - REV 01/24.  Learning About Stress  What is  stress?     Stress is your body's response to a hard situation. Your body can have a physical, emotional, or mental response. Stress is a fact of life for most people, and it affects everyone differently. What causes stress for you may not be stressful for someone else.  A lot of things can cause stress. You may feel stress when you go on a job interview, take a test, or run a race. This kind of short-term stress is normal and even useful. It can help you if you need to work hard or react quickly. For example, stress can help you finish an important job on time.  Long-term stress is caused by ongoing stressful situations or events. Examples of long-term stress include long-term health problems, ongoing problems at work, or conflicts in your family. Long-term stress can harm your health.  How does stress affect your health?  When you are stressed, your body responds as though you are in danger. It makes hormones that speed up your heart, make you breathe faster, and give you a burst of energy. This is called the fight-or-flight stress response. If the stress is over quickly, your body goes back to normal and no harm is done.  But if stress happens too often or lasts too long, it can have bad effects. Long-term stress can make you more likely to get sick, and it can make symptoms of some diseases worse. If you tense up when you are stressed, you may develop neck, shoulder, or low back pain. Stress is linked to high blood pressure and heart disease.  Stress also harms your emotional health. It can make you don, tense, or depressed. Your relationships may suffer, and you may not do well at work or school.  What can you do to manage stress?  You can try these things to help manage stress:   Do something active. Exercise or activity can help reduce stress. Walking is a great way to get started. Even everyday activities such as housecleaning or yard work can help.  Try yoga or chikis chi. These techniques combine exercise  and meditation. You may need some training at first to learn them.  Do something you enjoy. For example, listen to music or go to a movie. Practice your hobby or do volunteer work.  Meditate. This can help you relax, because you are not worrying about what happened before or what may happen in the future.  Do guided imagery. Imagine yourself in any setting that helps you feel calm. You can use online videos, books, or a teacher to guide you.  Do breathing exercises. For example:  From a standing position, bend forward from the waist with your knees slightly bent. Let your arms dangle close to the floor.  Breathe in slowly and deeply as you return to a standing position. Roll up slowly and lift your head last.  Hold your breath for just a few seconds in the standing position.  Breathe out slowly and bend forward from the waist.  Let your feelings out. Talk, laugh, cry, and express anger when you need to. Talking with supportive friends or family, a counselor, or a skyler leader about your feelings is a healthy way to relieve stress. Avoid discussing your feelings with people who make you feel worse.  Write. It may help to write about things that are bothering you. This helps you find out how much stress you feel and what is causing it. When you know this, you can find better ways to cope.  What can you do to prevent stress?  You might try some of these things to help prevent stress:  Manage your time. This helps you find time to do the things you want and need to do.  Get enough sleep. Your body recovers from the stresses of the day while you are sleeping.  Get support. Your family, friends, and community can make a difference in how you experience stress.  Limit your news feed. Avoid or limit time on social media or news that may make you feel stressed.  Do something active. Exercise or activity can help reduce stress. Walking is a great way to get started.  Where can you learn more?  Go to  "https://www.tracx.net/patiented  Enter N032 in the search box to learn more about \"Learning About Stress.\"  Current as of: October 24, 2024  Content Version: 14.5 2024-2025 [employer].   Care instructions adapted under license by your healthcare professional. If you have questions about a medical condition or this instruction, always ask your healthcare professional. [employer] disclaims any warranty or liability for your use of this information.       "

## (undated) DEVICE — KIT ENDO FIRST STEP DISINFECTANT 200ML W/POUCH EP-4

## (undated) DEVICE — PAD CHUX UNDERPAD 23X24" 7136

## (undated) RX ORDER — ONDANSETRON 2 MG/ML
INJECTION INTRAMUSCULAR; INTRAVENOUS
Status: DISPENSED
Start: 2022-04-20

## (undated) RX ORDER — FENTANYL CITRATE 50 UG/ML
INJECTION, SOLUTION INTRAMUSCULAR; INTRAVENOUS
Status: DISPENSED
Start: 2022-04-20